# Patient Record
Sex: MALE | Race: BLACK OR AFRICAN AMERICAN | NOT HISPANIC OR LATINO | Employment: OTHER | RURAL
[De-identification: names, ages, dates, MRNs, and addresses within clinical notes are randomized per-mention and may not be internally consistent; named-entity substitution may affect disease eponyms.]

---

## 2018-03-29 ENCOUNTER — HISTORICAL (OUTPATIENT)
Dept: ADMINISTRATIVE | Facility: HOSPITAL | Age: 68
End: 2018-03-29

## 2018-04-02 LAB
LAB AP CLINICAL INFORMATION: NORMAL
LAB AP DIAGNOSIS - HISTORICAL: NORMAL
LAB AP GROSS PATHOLOGY - HISTORICAL: NORMAL
LAB AP SPECIMEN SUBMITTED - HISTORICAL: NORMAL

## 2019-06-04 ENCOUNTER — HISTORICAL (OUTPATIENT)
Dept: ADMINISTRATIVE | Facility: HOSPITAL | Age: 69
End: 2019-06-04

## 2020-10-22 ENCOUNTER — HISTORICAL (OUTPATIENT)
Dept: ADMINISTRATIVE | Facility: HOSPITAL | Age: 70
End: 2020-10-22

## 2020-10-22 LAB
ALBUMIN SERPL BCP-MCNC: 3.3 G/DL (ref 3.5–5)
ALBUMIN/GLOB SERPL: 0.8 {RATIO}
ALP SERPL-CCNC: 76 U/L (ref 45–115)
ALT SERPL W P-5'-P-CCNC: 20 U/L (ref 16–61)
ANION GAP SERPL CALCULATED.3IONS-SCNC: 7.7 MMOL/L (ref 7–16)
AST SERPL W P-5'-P-CCNC: 11 U/L (ref 15–37)
BILIRUB SERPL-MCNC: 0.6 MG/DL (ref 0–1.2)
BUN SERPL-MCNC: 21 MG/DL (ref 7–18)
BUN/CREAT SERPL: 15
CALCIUM SERPL-MCNC: 10 MG/DL (ref 8.5–10.1)
CHLORIDE SERPL-SCNC: 107 MMOL/L (ref 98–107)
CO2 SERPL-SCNC: 30 MMOL/L (ref 21–32)
CREAT SERPL-MCNC: 1.43 MG/DL (ref 0.7–1.3)
GLOBULIN SER-MCNC: 4.3 G/DL (ref 2–4)
GLUCOSE SERPL-MCNC: 168 MG/DL (ref 74–106)
NT-PROBNP SERPL-MCNC: 101 PG/ML (ref 1–125)
POTASSIUM SERPL-SCNC: 4.7 MMOL/L (ref 3.5–5.1)
PROT SERPL-MCNC: 7.6 G/DL (ref 6.4–8.2)
SODIUM SERPL-SCNC: 140 MMOL/L (ref 136–145)

## 2021-06-09 ENCOUNTER — OFFICE VISIT (OUTPATIENT)
Dept: FAMILY MEDICINE | Facility: CLINIC | Age: 71
End: 2021-06-09
Payer: COMMERCIAL

## 2021-06-09 VITALS
BODY MASS INDEX: 30.01 KG/M2 | SYSTOLIC BLOOD PRESSURE: 135 MMHG | OXYGEN SATURATION: 97 % | DIASTOLIC BLOOD PRESSURE: 76 MMHG | TEMPERATURE: 97 F | WEIGHT: 198 LBS | HEART RATE: 96 BPM | HEIGHT: 68 IN

## 2021-06-09 DIAGNOSIS — I10 ESSENTIAL HYPERTENSION: ICD-10-CM

## 2021-06-09 DIAGNOSIS — I50.9 CONGESTIVE HEART FAILURE, UNSPECIFIED HF CHRONICITY, UNSPECIFIED HEART FAILURE TYPE: ICD-10-CM

## 2021-06-09 DIAGNOSIS — N18.9 CHRONIC KIDNEY DISEASE, UNSPECIFIED CKD STAGE: ICD-10-CM

## 2021-06-09 DIAGNOSIS — E11.9 TYPE 2 DIABETES MELLITUS WITHOUT COMPLICATION, WITH LONG-TERM CURRENT USE OF INSULIN: ICD-10-CM

## 2021-06-09 DIAGNOSIS — R53.83 FATIGUE, UNSPECIFIED TYPE: ICD-10-CM

## 2021-06-09 DIAGNOSIS — K21.9 GASTROESOPHAGEAL REFLUX DISEASE WITHOUT ESOPHAGITIS: ICD-10-CM

## 2021-06-09 DIAGNOSIS — R63.0 DECREASED APPETITE: Primary | ICD-10-CM

## 2021-06-09 DIAGNOSIS — Z79.4 TYPE 2 DIABETES MELLITUS WITHOUT COMPLICATION, WITH LONG-TERM CURRENT USE OF INSULIN: ICD-10-CM

## 2021-06-09 PROCEDURE — 82043 MICROALBUMIN / CREATININE RATIO URINE: ICD-10-PCS | Mod: QW,,, | Performed by: CLINICAL MEDICAL LABORATORY

## 2021-06-09 PROCEDURE — 82043 UR ALBUMIN QUANTITATIVE: CPT | Mod: QW,,, | Performed by: CLINICAL MEDICAL LABORATORY

## 2021-06-09 PROCEDURE — 87086 CULTURE, URINE: ICD-10-PCS | Mod: GZ,,, | Performed by: CLINICAL MEDICAL LABORATORY

## 2021-06-09 PROCEDURE — 99214 OFFICE O/P EST MOD 30 MIN: CPT | Mod: ,,, | Performed by: FAMILY MEDICINE

## 2021-06-09 PROCEDURE — 80061 LIPID PANEL: CPT | Mod: QW,,, | Performed by: CLINICAL MEDICAL LABORATORY

## 2021-06-09 PROCEDURE — 83036 HEMOGLOBIN GLYCOSYLATED A1C: CPT | Mod: QW,,, | Performed by: CLINICAL MEDICAL LABORATORY

## 2021-06-09 PROCEDURE — 82570 MICROALBUMIN / CREATININE RATIO URINE: ICD-10-PCS | Mod: QW,,, | Performed by: CLINICAL MEDICAL LABORATORY

## 2021-06-09 PROCEDURE — 85025 COMPLETE CBC W/AUTO DIFF WBC: CPT | Mod: ,,, | Performed by: CLINICAL MEDICAL LABORATORY

## 2021-06-09 PROCEDURE — 85025 CBC WITH DIFFERENTIAL: ICD-10-PCS | Mod: ,,, | Performed by: CLINICAL MEDICAL LABORATORY

## 2021-06-09 PROCEDURE — 81001 URINALYSIS AUTO W/SCOPE: CPT | Mod: ,,, | Performed by: CLINICAL MEDICAL LABORATORY

## 2021-06-09 PROCEDURE — 83036 HEMOGLOBIN A1C: ICD-10-PCS | Mod: QW,,, | Performed by: CLINICAL MEDICAL LABORATORY

## 2021-06-09 PROCEDURE — 99214 PR OFFICE/OUTPT VISIT, EST, LEVL IV, 30-39 MIN: ICD-10-PCS | Mod: ,,, | Performed by: FAMILY MEDICINE

## 2021-06-09 PROCEDURE — 82570 ASSAY OF URINE CREATININE: CPT | Mod: QW,,, | Performed by: CLINICAL MEDICAL LABORATORY

## 2021-06-09 PROCEDURE — 87086 URINE CULTURE/COLONY COUNT: CPT | Mod: GZ,,, | Performed by: CLINICAL MEDICAL LABORATORY

## 2021-06-09 PROCEDURE — 80053 COMPREHENSIVE METABOLIC PANEL: ICD-10-PCS | Mod: QW,,, | Performed by: CLINICAL MEDICAL LABORATORY

## 2021-06-09 PROCEDURE — 80053 COMPREHEN METABOLIC PANEL: CPT | Mod: QW,,, | Performed by: CLINICAL MEDICAL LABORATORY

## 2021-06-09 PROCEDURE — 81001 URINALYSIS: ICD-10-PCS | Mod: ,,, | Performed by: CLINICAL MEDICAL LABORATORY

## 2021-06-09 PROCEDURE — 84443 ASSAY THYROID STIM HORMONE: CPT | Mod: QW,,, | Performed by: CLINICAL MEDICAL LABORATORY

## 2021-06-09 PROCEDURE — 80061 LIPID PANEL: ICD-10-PCS | Mod: QW,,, | Performed by: CLINICAL MEDICAL LABORATORY

## 2021-06-09 PROCEDURE — 84443 TSH: ICD-10-PCS | Mod: QW,,, | Performed by: CLINICAL MEDICAL LABORATORY

## 2021-06-09 RX ORDER — FUROSEMIDE 20 MG/1
20 TABLET ORAL 2 TIMES DAILY
COMMUNITY
Start: 2020-12-28 | End: 2022-03-01 | Stop reason: SDUPTHER

## 2021-06-09 RX ORDER — GLIPIZIDE 10 MG/1
10 TABLET ORAL
COMMUNITY
Start: 2021-03-02 | End: 2022-03-01 | Stop reason: SDUPTHER

## 2021-06-09 RX ORDER — GABAPENTIN 600 MG/1
600 TABLET ORAL 2 TIMES DAILY
COMMUNITY
Start: 2021-02-19 | End: 2021-11-29

## 2021-06-09 RX ORDER — TAMSULOSIN HYDROCHLORIDE 0.4 MG/1
1 CAPSULE ORAL
COMMUNITY
End: 2022-03-01 | Stop reason: SDUPTHER

## 2021-06-09 RX ORDER — MONTELUKAST SODIUM 10 MG/1
10 TABLET ORAL NIGHTLY
COMMUNITY
Start: 2021-03-22 | End: 2021-09-08

## 2021-06-09 RX ORDER — AMLODIPINE AND BENAZEPRIL HYDROCHLORIDE 5; 20 MG/1; MG/1
1 CAPSULE ORAL DAILY
COMMUNITY
Start: 2021-03-22 | End: 2022-02-28

## 2021-06-09 RX ORDER — ALLOPURINOL 100 MG/1
100 TABLET ORAL DAILY
COMMUNITY
Start: 2021-03-12 | End: 2022-02-16 | Stop reason: SDUPTHER

## 2021-06-09 RX ORDER — LOVASTATIN 20 MG/1
20 TABLET ORAL NIGHTLY
COMMUNITY
Start: 2021-03-15 | End: 2022-03-01 | Stop reason: SDUPTHER

## 2021-06-09 RX ORDER — METFORMIN HYDROCHLORIDE 1000 MG/1
1000 TABLET ORAL 2 TIMES DAILY
COMMUNITY
Start: 2021-03-08 | End: 2022-03-01 | Stop reason: SDUPTHER

## 2021-06-09 RX ORDER — CYPROHEPTADINE HYDROCHLORIDE 4 MG/1
TABLET ORAL
Qty: 30 TABLET | Refills: 1 | Status: SHIPPED | OUTPATIENT
Start: 2021-06-09

## 2021-06-09 RX ORDER — PEN NEEDLE, DIABETIC 32GX 5/32"
NEEDLE, DISPOSABLE MISCELLANEOUS
COMMUNITY
Start: 2020-12-28

## 2021-06-10 LAB
ALBUMIN SERPL BCP-MCNC: 3.3 G/DL (ref 3.5–5)
ALBUMIN/GLOB SERPL: 0.8 {RATIO}
ALP SERPL-CCNC: 76 U/L (ref 45–115)
ALT SERPL W P-5'-P-CCNC: 15 U/L (ref 16–61)
ANION GAP SERPL CALCULATED.3IONS-SCNC: 8 MMOL/L (ref 7–16)
AST SERPL W P-5'-P-CCNC: 10 U/L (ref 15–37)
BACTERIA #/AREA URNS HPF: ABNORMAL /HPF
BASOPHILS # BLD AUTO: 0.03 K/UL (ref 0–0.2)
BASOPHILS NFR BLD AUTO: 0.4 % (ref 0–1)
BILIRUB SERPL-MCNC: 0.5 MG/DL (ref 0–1.2)
BILIRUB UR QL STRIP: NEGATIVE
BUN SERPL-MCNC: 20 MG/DL (ref 7–18)
BUN/CREAT SERPL: 13 (ref 6–20)
CALCIUM SERPL-MCNC: 9.9 MG/DL (ref 8.5–10.1)
CHLORIDE SERPL-SCNC: 104 MMOL/L (ref 98–107)
CHOLEST SERPL-MCNC: 122 MG/DL (ref 0–200)
CHOLEST/HDLC SERPL: 3.7 {RATIO}
CLARITY UR: ABNORMAL
CO2 SERPL-SCNC: 31 MMOL/L (ref 21–32)
COARSE GRAN CASTS #/AREA URNS LPF: ABNORMAL /LPF
COLOR UR: YELLOW
CREAT SERPL-MCNC: 1.6 MG/DL (ref 0.7–1.3)
CREAT UR-MCNC: 348 MG/DL (ref 39–259)
DIFFERENTIAL METHOD BLD: ABNORMAL
EOSINOPHIL # BLD AUTO: 0.1 K/UL (ref 0–0.5)
EOSINOPHIL NFR BLD AUTO: 1.5 % (ref 1–4)
ERYTHROCYTE [DISTWIDTH] IN BLOOD BY AUTOMATED COUNT: 13 % (ref 11.5–14.5)
EST. AVERAGE GLUCOSE BLD GHB EST-MCNC: 90 MG/DL
GLOBULIN SER-MCNC: 4.2 G/DL (ref 2–4)
GLUCOSE SERPL-MCNC: 150 MG/DL (ref 74–106)
GLUCOSE UR STRIP-MCNC: NEGATIVE MG/DL
HBA1C MFR BLD HPLC: 5.3 % (ref 4.5–6.6)
HCT VFR BLD AUTO: 36.3 % (ref 40–54)
HDLC SERPL-MCNC: 33 MG/DL (ref 40–60)
HGB BLD-MCNC: 11.8 G/DL (ref 13.5–18)
HYALINE CASTS #/AREA URNS LPF: ABNORMAL /LPF
IMM GRANULOCYTES # BLD AUTO: 0.02 K/UL (ref 0–0.04)
IMM GRANULOCYTES NFR BLD: 0.3 % (ref 0–0.4)
KETONES UR STRIP-SCNC: ABNORMAL MG/DL
LDLC SERPL CALC-MCNC: 62 MG/DL
LDLC/HDLC SERPL: 1.9 {RATIO}
LEUKOCYTE ESTERASE UR QL STRIP: NEGATIVE
LYMPHOCYTES # BLD AUTO: 2.32 K/UL (ref 1–4.8)
LYMPHOCYTES NFR BLD AUTO: 34.8 % (ref 27–41)
MCH RBC QN AUTO: 30.2 PG (ref 27–31)
MCHC RBC AUTO-ENTMCNC: 32.5 G/DL (ref 32–36)
MCV RBC AUTO: 92.8 FL (ref 80–96)
MICROALBUMIN UR-MCNC: 106 MG/DL (ref 0–2.8)
MICROALBUMIN/CREAT RATIO PNL UR: 304.6 MG/G (ref 0–30)
MONOCYTES # BLD AUTO: 0.45 K/UL (ref 0–0.8)
MONOCYTES NFR BLD AUTO: 6.7 % (ref 2–6)
MPC BLD CALC-MCNC: 11.2 FL (ref 9.4–12.4)
MUCOUS THREADS #/AREA URNS HPF: ABNORMAL /HPF
NEUTROPHILS # BLD AUTO: 3.75 K/UL (ref 1.8–7.7)
NEUTROPHILS NFR BLD AUTO: 56.3 % (ref 53–65)
NITRITE UR QL STRIP: NEGATIVE
NONHDLC SERPL-MCNC: 89 MG/DL
NRBC # BLD AUTO: 0 X10E3/UL
NRBC, AUTO (.00): 0 %
PH UR STRIP: 5 PH UNITS
PLATELET # BLD AUTO: 184 K/UL (ref 150–400)
POTASSIUM SERPL-SCNC: 4.4 MMOL/L (ref 3.5–5.1)
PROT SERPL-MCNC: 7.5 G/DL (ref 6.4–8.2)
PROT UR QL STRIP: 100
RBC # BLD AUTO: 3.91 M/UL (ref 4.6–6.2)
RBC # UR STRIP: NEGATIVE /UL
RBC #/AREA URNS HPF: ABNORMAL /HPF
SODIUM SERPL-SCNC: 139 MMOL/L (ref 136–145)
SP GR UR STRIP: >=1.03
SQUAMOUS #/AREA URNS LPF: ABNORMAL /LPF
TRIGL SERPL-MCNC: 133 MG/DL (ref 35–150)
TSH SERPL DL<=0.005 MIU/L-ACNC: 0.26 UIU/ML (ref 0.36–3.74)
UROBILINOGEN UR STRIP-ACNC: 0.2 MG/DL
VLDLC SERPL-MCNC: 27 MG/DL
WBC # BLD AUTO: 6.67 K/UL (ref 4.5–11)
WBC #/AREA URNS HPF: ABNORMAL /HPF

## 2021-06-12 LAB — UA COMPLETE W REFLEX CULTURE PNL UR: NORMAL

## 2021-06-15 RX ORDER — CEFDINIR 300 MG/1
300 CAPSULE ORAL 2 TIMES DAILY
Qty: 20 CAPSULE | Refills: 0 | Status: SHIPPED | OUTPATIENT
Start: 2021-06-15 | End: 2021-06-25

## 2021-06-17 ENCOUNTER — TELEPHONE (OUTPATIENT)
Dept: FAMILY MEDICINE | Facility: CLINIC | Age: 71
End: 2021-06-17

## 2021-06-22 ENCOUNTER — TELEPHONE (OUTPATIENT)
Dept: FAMILY MEDICINE | Facility: CLINIC | Age: 71
End: 2021-06-22

## 2021-06-22 DIAGNOSIS — R79.89 LOW SERUM THYROID STIMULATING HORMONE (TSH): Primary | ICD-10-CM

## 2021-08-24 ENCOUNTER — OFFICE VISIT (OUTPATIENT)
Dept: FAMILY MEDICINE | Facility: CLINIC | Age: 71
End: 2021-08-24
Payer: COMMERCIAL

## 2021-08-24 VITALS
HEART RATE: 93 BPM | DIASTOLIC BLOOD PRESSURE: 88 MMHG | TEMPERATURE: 98 F | OXYGEN SATURATION: 96 % | WEIGHT: 192 LBS | BODY MASS INDEX: 29.1 KG/M2 | SYSTOLIC BLOOD PRESSURE: 143 MMHG | HEIGHT: 68 IN

## 2021-08-24 DIAGNOSIS — Z71.6 ENCOUNTER FOR SMOKING CESSATION COUNSELING: Primary | ICD-10-CM

## 2021-08-24 DIAGNOSIS — K21.9 GASTROESOPHAGEAL REFLUX DISEASE WITHOUT ESOPHAGITIS: ICD-10-CM

## 2021-08-24 DIAGNOSIS — I10 ESSENTIAL HYPERTENSION: ICD-10-CM

## 2021-08-24 DIAGNOSIS — Z79.4 TYPE 2 DIABETES MELLITUS WITHOUT COMPLICATION, WITH LONG-TERM CURRENT USE OF INSULIN: ICD-10-CM

## 2021-08-24 DIAGNOSIS — E11.9 TYPE 2 DIABETES MELLITUS WITHOUT COMPLICATION, WITH LONG-TERM CURRENT USE OF INSULIN: ICD-10-CM

## 2021-08-24 DIAGNOSIS — I50.9 CONGESTIVE HEART FAILURE, UNSPECIFIED HF CHRONICITY, UNSPECIFIED HEART FAILURE TYPE: ICD-10-CM

## 2021-08-24 DIAGNOSIS — E05.90 HYPERTHYROIDISM: ICD-10-CM

## 2021-08-24 DIAGNOSIS — N18.9 CHRONIC KIDNEY DISEASE, UNSPECIFIED CKD STAGE: ICD-10-CM

## 2021-08-24 PROCEDURE — 99213 OFFICE O/P EST LOW 20 MIN: CPT | Mod: ,,, | Performed by: FAMILY MEDICINE

## 2021-08-24 PROCEDURE — 99213 PR OFFICE/OUTPT VISIT, EST, LEVL III, 20-29 MIN: ICD-10-PCS | Mod: ,,, | Performed by: FAMILY MEDICINE

## 2021-08-24 RX ORDER — LATANOPROST 50 UG/ML
1 SOLUTION/ DROPS OPHTHALMIC NIGHTLY
COMMUNITY
Start: 2018-05-14 | End: 2021-08-24 | Stop reason: SDUPTHER

## 2021-08-24 RX ORDER — HYDROCHLOROTHIAZIDE 25 MG/1
25 TABLET ORAL DAILY
COMMUNITY
Start: 2021-07-22 | End: 2022-03-01 | Stop reason: SDUPTHER

## 2021-08-24 RX ORDER — CALCIUM CITRATE/VITAMIN D3 200MG-6.25
TABLET ORAL DAILY
COMMUNITY
Start: 2021-08-05

## 2021-08-24 RX ORDER — LATANOPROST 50 UG/ML
1 SOLUTION/ DROPS OPHTHALMIC NIGHTLY
Qty: 7.5 ML | Refills: 3 | Status: SHIPPED | OUTPATIENT
Start: 2021-08-24

## 2021-08-24 RX ORDER — IBUPROFEN 200 MG
1 TABLET ORAL DAILY
Qty: 30 PATCH | Refills: 1 | Status: SHIPPED | OUTPATIENT
Start: 2021-08-24 | End: 2022-03-01

## 2021-08-24 RX ORDER — CETIRIZINE HYDROCHLORIDE 10 MG/1
10 TABLET ORAL DAILY
COMMUNITY
Start: 2021-08-11 | End: 2021-09-08

## 2021-10-28 ENCOUNTER — IMMUNIZATION (OUTPATIENT)
Dept: FAMILY MEDICINE | Facility: CLINIC | Age: 71
End: 2021-10-28
Payer: COMMERCIAL

## 2021-10-28 ENCOUNTER — OFFICE VISIT (OUTPATIENT)
Dept: FAMILY MEDICINE | Facility: CLINIC | Age: 71
End: 2021-10-28
Payer: COMMERCIAL

## 2021-10-28 DIAGNOSIS — E05.90 HYPERTHYROIDISM: ICD-10-CM

## 2021-10-28 DIAGNOSIS — N18.9 CHRONIC KIDNEY DISEASE, UNSPECIFIED CKD STAGE: ICD-10-CM

## 2021-10-28 DIAGNOSIS — I50.9 CONGESTIVE HEART FAILURE, UNSPECIFIED HF CHRONICITY, UNSPECIFIED HEART FAILURE TYPE: ICD-10-CM

## 2021-10-28 DIAGNOSIS — E11.9 TYPE 2 DIABETES MELLITUS WITHOUT COMPLICATION, WITH LONG-TERM CURRENT USE OF INSULIN: Primary | ICD-10-CM

## 2021-10-28 DIAGNOSIS — Z23 NEED FOR VACCINATION: Primary | ICD-10-CM

## 2021-10-28 DIAGNOSIS — Z79.4 TYPE 2 DIABETES MELLITUS WITHOUT COMPLICATION, WITH LONG-TERM CURRENT USE OF INSULIN: Primary | ICD-10-CM

## 2021-10-28 DIAGNOSIS — I10 ESSENTIAL HYPERTENSION: ICD-10-CM

## 2021-10-28 PROCEDURE — 0013A PR IMMUNIZ ADMIN, SARS-COV-2 COVID-19 VACC, 100MCG/0.5ML, 3RD DOSE: CPT | Mod: ,,, | Performed by: NURSE PRACTITIONER

## 2021-10-28 PROCEDURE — 81001 URINALYSIS AUTO W/SCOPE: CPT | Mod: ,,, | Performed by: CLINICAL MEDICAL LABORATORY

## 2021-10-28 PROCEDURE — 80061 LIPID PANEL: CPT | Mod: ,,, | Performed by: CLINICAL MEDICAL LABORATORY

## 2021-10-28 PROCEDURE — 80053 COMPREHENSIVE METABOLIC PANEL: ICD-10-PCS | Mod: ,,, | Performed by: CLINICAL MEDICAL LABORATORY

## 2021-10-28 PROCEDURE — 80053 COMPREHEN METABOLIC PANEL: CPT | Mod: ,,, | Performed by: CLINICAL MEDICAL LABORATORY

## 2021-10-28 PROCEDURE — 83036 HEMOGLOBIN A1C: ICD-10-PCS | Mod: ,,, | Performed by: CLINICAL MEDICAL LABORATORY

## 2021-10-28 PROCEDURE — 83036 HEMOGLOBIN GLYCOSYLATED A1C: CPT | Mod: ,,, | Performed by: CLINICAL MEDICAL LABORATORY

## 2021-10-28 PROCEDURE — 99214 PR OFFICE/OUTPT VISIT, EST, LEVL IV, 30-39 MIN: ICD-10-PCS | Mod: ,,, | Performed by: FAMILY MEDICINE

## 2021-10-28 PROCEDURE — 91301 COVID-19, MRNA, LNP-S, PF, 100 MCG/0.25 ML DOSE VACCINE (MODERNA BOOSTER): ICD-10-PCS | Mod: ,,, | Performed by: NURSE PRACTITIONER

## 2021-10-28 PROCEDURE — 81001 URINALYSIS: ICD-10-PCS | Mod: ,,, | Performed by: CLINICAL MEDICAL LABORATORY

## 2021-10-28 PROCEDURE — 99214 OFFICE O/P EST MOD 30 MIN: CPT | Mod: ,,, | Performed by: FAMILY MEDICINE

## 2021-10-28 PROCEDURE — 0013A PR IMMUNIZ ADMIN, SARS-COV-2 COVID-19 VACC, 100MCG/0.5ML, 3RD DOSE: ICD-10-PCS | Mod: ,,, | Performed by: NURSE PRACTITIONER

## 2021-10-28 PROCEDURE — 80061 LIPID PANEL: ICD-10-PCS | Mod: ,,, | Performed by: CLINICAL MEDICAL LABORATORY

## 2021-10-28 PROCEDURE — 91301 COVID-19, MRNA, LNP-S, PF, 100 MCG/0.25 ML DOSE VACCINE (MODERNA BOOSTER): CPT | Mod: ,,, | Performed by: NURSE PRACTITIONER

## 2021-10-29 LAB
ALBUMIN SERPL BCP-MCNC: 3.5 G/DL (ref 3.5–5)
ALBUMIN/GLOB SERPL: 0.8 {RATIO}
ALP SERPL-CCNC: 85 U/L (ref 45–115)
ALT SERPL W P-5'-P-CCNC: 13 U/L (ref 16–61)
ANION GAP SERPL CALCULATED.3IONS-SCNC: 5 MMOL/L (ref 7–16)
AST SERPL W P-5'-P-CCNC: 9 U/L (ref 15–37)
BACTERIA #/AREA URNS HPF: ABNORMAL /HPF
BILIRUB SERPL-MCNC: 0.4 MG/DL (ref 0–1.2)
BILIRUB UR QL STRIP: NEGATIVE
BUN SERPL-MCNC: 22 MG/DL (ref 7–18)
BUN/CREAT SERPL: 13 (ref 6–20)
CALCIUM SERPL-MCNC: 9.9 MG/DL (ref 8.5–10.1)
CAOX CRY #/AREA URNS LPF: ABNORMAL /LPF
CHLORIDE SERPL-SCNC: 109 MMOL/L (ref 98–107)
CHOLEST SERPL-MCNC: 117 MG/DL (ref 0–200)
CHOLEST/HDLC SERPL: 3.2 {RATIO}
CLARITY UR: CLEAR
CO2 SERPL-SCNC: 32 MMOL/L (ref 21–32)
COLOR UR: YELLOW
CREAT SERPL-MCNC: 1.63 MG/DL (ref 0.7–1.3)
EST. AVERAGE GLUCOSE BLD GHB EST-MCNC: 94 MG/DL
GLOBULIN SER-MCNC: 4.3 G/DL (ref 2–4)
GLUCOSE SERPL-MCNC: 81 MG/DL (ref 74–106)
GLUCOSE UR STRIP-MCNC: NEGATIVE MG/DL
HBA1C MFR BLD HPLC: 5.4 % (ref 4.5–6.6)
HDLC SERPL-MCNC: 37 MG/DL (ref 40–60)
KETONES UR STRIP-SCNC: NEGATIVE MG/DL
LDLC SERPL CALC-MCNC: 55 MG/DL
LDLC/HDLC SERPL: 1.5 {RATIO}
LEUKOCYTE ESTERASE UR QL STRIP: NEGATIVE
NITRITE UR QL STRIP: NEGATIVE
NONHDLC SERPL-MCNC: 80 MG/DL
PH UR STRIP: 5.5 PH UNITS
POTASSIUM SERPL-SCNC: 4.4 MMOL/L (ref 3.5–5.1)
PROT SERPL-MCNC: 7.8 G/DL (ref 6.4–8.2)
PROT UR QL STRIP: 100
RBC # UR STRIP: ABNORMAL /UL
RBC #/AREA URNS HPF: ABNORMAL /HPF
SODIUM SERPL-SCNC: 142 MMOL/L (ref 136–145)
SP GR UR STRIP: 1.02
SQUAMOUS #/AREA URNS LPF: ABNORMAL /LPF
TRIGL SERPL-MCNC: 123 MG/DL (ref 35–150)
UROBILINOGEN UR STRIP-ACNC: 1 MG/DL
VLDLC SERPL-MCNC: 25 MG/DL
WBC #/AREA URNS HPF: ABNORMAL /HPF

## 2021-11-02 ENCOUNTER — TELEPHONE (OUTPATIENT)
Dept: FAMILY MEDICINE | Facility: CLINIC | Age: 71
End: 2021-11-02
Payer: COMMERCIAL

## 2021-11-17 ENCOUNTER — OFFICE VISIT (OUTPATIENT)
Dept: FAMILY MEDICINE | Facility: CLINIC | Age: 71
End: 2021-11-17
Payer: COMMERCIAL

## 2021-11-17 VITALS
WEIGHT: 187.38 LBS | TEMPERATURE: 98 F | DIASTOLIC BLOOD PRESSURE: 80 MMHG | SYSTOLIC BLOOD PRESSURE: 142 MMHG | BODY MASS INDEX: 28.4 KG/M2 | HEIGHT: 68 IN | HEART RATE: 91 BPM | OXYGEN SATURATION: 97 %

## 2021-11-17 DIAGNOSIS — Z23 ENCOUNTER FOR IMMUNIZATION: Primary | ICD-10-CM

## 2021-11-17 DIAGNOSIS — I10 ESSENTIAL HYPERTENSION: ICD-10-CM

## 2021-11-17 DIAGNOSIS — E05.90 HYPERTHYROIDISM: ICD-10-CM

## 2021-11-17 DIAGNOSIS — E11.9 TYPE 2 DIABETES MELLITUS WITHOUT COMPLICATION, WITH LONG-TERM CURRENT USE OF INSULIN: ICD-10-CM

## 2021-11-17 DIAGNOSIS — Z79.4 TYPE 2 DIABETES MELLITUS WITHOUT COMPLICATION, WITH LONG-TERM CURRENT USE OF INSULIN: ICD-10-CM

## 2021-11-17 PROCEDURE — G0008 FLU VACCINE - QUADRIVALENT - HIGH DOSE (65+) PRESERVATIVE FREE IM: ICD-10-PCS | Mod: ,,, | Performed by: FAMILY MEDICINE

## 2021-11-17 PROCEDURE — 90662 IIV NO PRSV INCREASED AG IM: CPT | Mod: ,,, | Performed by: FAMILY MEDICINE

## 2021-11-17 PROCEDURE — G0008 ADMIN INFLUENZA VIRUS VAC: HCPCS | Mod: ,,, | Performed by: FAMILY MEDICINE

## 2021-11-17 PROCEDURE — 99214 OFFICE O/P EST MOD 30 MIN: CPT | Mod: ,,, | Performed by: FAMILY MEDICINE

## 2021-11-17 PROCEDURE — 90662 FLU VACCINE - QUADRIVALENT - HIGH DOSE (65+) PRESERVATIVE FREE IM: ICD-10-PCS | Mod: ,,, | Performed by: FAMILY MEDICINE

## 2021-11-17 PROCEDURE — 99214 PR OFFICE/OUTPT VISIT, EST, LEVL IV, 30-39 MIN: ICD-10-PCS | Mod: ,,, | Performed by: FAMILY MEDICINE

## 2022-02-16 RX ORDER — ALLOPURINOL 100 MG/1
100 TABLET ORAL DAILY
Qty: 90 TABLET | Refills: 2 | Status: SHIPPED | OUTPATIENT
Start: 2022-02-16 | End: 2022-03-01 | Stop reason: SDUPTHER

## 2022-03-01 ENCOUNTER — OFFICE VISIT (OUTPATIENT)
Dept: FAMILY MEDICINE | Facility: CLINIC | Age: 72
End: 2022-03-01
Payer: COMMERCIAL

## 2022-03-01 VITALS
BODY MASS INDEX: 28.19 KG/M2 | WEIGHT: 186 LBS | HEART RATE: 97 BPM | SYSTOLIC BLOOD PRESSURE: 143 MMHG | TEMPERATURE: 97 F | HEIGHT: 68 IN | DIASTOLIC BLOOD PRESSURE: 85 MMHG | OXYGEN SATURATION: 98 %

## 2022-03-01 DIAGNOSIS — Z79.4 TYPE 2 DIABETES MELLITUS WITHOUT COMPLICATION, WITH LONG-TERM CURRENT USE OF INSULIN: Primary | ICD-10-CM

## 2022-03-01 DIAGNOSIS — K21.9 GASTROESOPHAGEAL REFLUX DISEASE WITHOUT ESOPHAGITIS: ICD-10-CM

## 2022-03-01 DIAGNOSIS — E11.9 TYPE 2 DIABETES MELLITUS WITHOUT COMPLICATION, WITH LONG-TERM CURRENT USE OF INSULIN: Primary | ICD-10-CM

## 2022-03-01 DIAGNOSIS — N40.0 BENIGN PROSTATIC HYPERPLASIA WITHOUT LOWER URINARY TRACT SYMPTOMS: ICD-10-CM

## 2022-03-01 DIAGNOSIS — I10 ESSENTIAL HYPERTENSION: ICD-10-CM

## 2022-03-01 DIAGNOSIS — M1A.09X0 CHRONIC GOUT OF MULTIPLE SITES, UNSPECIFIED CAUSE: ICD-10-CM

## 2022-03-01 DIAGNOSIS — N18.9 CHRONIC KIDNEY DISEASE, UNSPECIFIED CKD STAGE: ICD-10-CM

## 2022-03-01 DIAGNOSIS — E05.90 HYPERTHYROIDISM: ICD-10-CM

## 2022-03-01 DIAGNOSIS — I50.9 CONGESTIVE HEART FAILURE, UNSPECIFIED HF CHRONICITY, UNSPECIFIED HEART FAILURE TYPE: ICD-10-CM

## 2022-03-01 DIAGNOSIS — Z12.5 SCREENING FOR MALIGNANT NEOPLASM OF PROSTATE: ICD-10-CM

## 2022-03-01 DIAGNOSIS — R53.1 WEAKNESS: ICD-10-CM

## 2022-03-01 DIAGNOSIS — J30.89 NON-SEASONAL ALLERGIC RHINITIS DUE TO OTHER ALLERGIC TRIGGER: ICD-10-CM

## 2022-03-01 PROBLEM — M10.9 GOUT OF MULTIPLE SITES: Status: ACTIVE | Noted: 2022-03-01

## 2022-03-01 PROBLEM — J30.9 ALLERGIC RHINITIS: Status: ACTIVE | Noted: 2022-03-01

## 2022-03-01 LAB
ALBUMIN SERPL BCP-MCNC: 3 G/DL (ref 3.5–5)
ALBUMIN/GLOB SERPL: 0.7 {RATIO}
ALP SERPL-CCNC: 105 U/L (ref 45–115)
ALT SERPL W P-5'-P-CCNC: 14 U/L (ref 16–61)
ANION GAP SERPL CALCULATED.3IONS-SCNC: 7 MMOL/L (ref 7–16)
AST SERPL W P-5'-P-CCNC: 7 U/L (ref 15–37)
BACTERIA #/AREA URNS HPF: NORMAL /HPF
BASOPHILS # BLD AUTO: 0.02 K/UL (ref 0–0.2)
BASOPHILS NFR BLD AUTO: 0.3 % (ref 0–1)
BILIRUB SERPL-MCNC: 0.3 MG/DL (ref 0–1.2)
BILIRUB UR QL STRIP: NEGATIVE
BUN SERPL-MCNC: 14 MG/DL (ref 7–18)
BUN/CREAT SERPL: 10 (ref 6–20)
CALCIUM SERPL-MCNC: 9 MG/DL (ref 8.5–10.1)
CHLORIDE SERPL-SCNC: 107 MMOL/L (ref 98–107)
CHOLEST SERPL-MCNC: 120 MG/DL (ref 0–200)
CHOLEST/HDLC SERPL: 3.1 {RATIO}
CLARITY UR: CLEAR
CO2 SERPL-SCNC: 31 MMOL/L (ref 21–32)
COLOR UR: YELLOW
CREAT SERPL-MCNC: 1.41 MG/DL (ref 0.7–1.3)
CREAT UR-MCNC: 165 MG/DL (ref 39–259)
DIFFERENTIAL METHOD BLD: ABNORMAL
EOSINOPHIL # BLD AUTO: 0.11 K/UL (ref 0–0.5)
EOSINOPHIL NFR BLD AUTO: 1.7 % (ref 1–4)
ERYTHROCYTE [DISTWIDTH] IN BLOOD BY AUTOMATED COUNT: 13.7 % (ref 11.5–14.5)
EST. AVERAGE GLUCOSE BLD GHB EST-MCNC: 87 MG/DL
GLOBULIN SER-MCNC: 4.1 G/DL (ref 2–4)
GLUCOSE SERPL-MCNC: 223 MG/DL (ref 74–106)
GLUCOSE UR STRIP-MCNC: 250 MG/DL
HBA1C MFR BLD HPLC: 5.2 % (ref 4.5–6.6)
HCT VFR BLD AUTO: 37.4 % (ref 40–54)
HDLC SERPL-MCNC: 39 MG/DL (ref 40–60)
HGB BLD-MCNC: 12.4 G/DL (ref 13.5–18)
IMM GRANULOCYTES # BLD AUTO: 0.01 K/UL (ref 0–0.04)
IMM GRANULOCYTES NFR BLD: 0.2 % (ref 0–0.4)
KETONES UR STRIP-SCNC: NEGATIVE MG/DL
LDLC SERPL CALC-MCNC: 59 MG/DL
LDLC/HDLC SERPL: 1.5 {RATIO}
LEUKOCYTE ESTERASE UR QL STRIP: NEGATIVE
LYMPHOCYTES # BLD AUTO: 1.74 K/UL (ref 1–4.8)
LYMPHOCYTES NFR BLD AUTO: 27.4 % (ref 27–41)
MCH RBC QN AUTO: 30.8 PG (ref 27–31)
MCHC RBC AUTO-ENTMCNC: 33.2 G/DL (ref 32–36)
MCV RBC AUTO: 92.8 FL (ref 80–96)
MICROALBUMIN UR-MCNC: 520 MG/DL (ref 0–2.8)
MICROALBUMIN/CREAT RATIO PNL UR: 3151.5 MG/G (ref 0–30)
MONOCYTES # BLD AUTO: 0.34 K/UL (ref 0–0.8)
MONOCYTES NFR BLD AUTO: 5.4 % (ref 2–6)
MPC BLD CALC-MCNC: 10.2 FL (ref 9.4–12.4)
NEUTROPHILS # BLD AUTO: 4.12 K/UL (ref 1.8–7.7)
NEUTROPHILS NFR BLD AUTO: 65 % (ref 53–65)
NITRITE UR QL STRIP: NEGATIVE
NONHDLC SERPL-MCNC: 81 MG/DL
NRBC # BLD AUTO: 0 X10E3/UL
NRBC, AUTO (.00): 0 %
PH UR STRIP: 6 PH UNITS
PLATELET # BLD AUTO: 185 K/UL (ref 150–400)
POTASSIUM SERPL-SCNC: 4.4 MMOL/L (ref 3.5–5.1)
PROT SERPL-MCNC: 7.1 G/DL (ref 6.4–8.2)
PROT UR QL STRIP: >=300
PSA SERPL-MCNC: 0.86 NG/ML (ref 0–4.4)
RBC # BLD AUTO: 4.03 M/UL (ref 4.6–6.2)
RBC # UR STRIP: ABNORMAL /UL
RBC #/AREA URNS HPF: NORMAL /HPF
SODIUM SERPL-SCNC: 141 MMOL/L (ref 136–145)
SP GR UR STRIP: >=1.03
SQUAMOUS #/AREA URNS LPF: NORMAL /LPF
TRIGL SERPL-MCNC: 110 MG/DL (ref 35–150)
UROBILINOGEN UR STRIP-ACNC: 1 MG/DL
VLDLC SERPL-MCNC: 22 MG/DL
WBC # BLD AUTO: 6.34 K/UL (ref 4.5–11)
WBC #/AREA URNS HPF: NORMAL /HPF

## 2022-03-01 PROCEDURE — 85025 COMPLETE CBC W/AUTO DIFF WBC: CPT | Mod: ,,, | Performed by: CLINICAL MEDICAL LABORATORY

## 2022-03-01 PROCEDURE — 1159F MED LIST DOCD IN RCRD: CPT | Mod: ,,, | Performed by: FAMILY MEDICINE

## 2022-03-01 PROCEDURE — 85025 CBC WITH DIFFERENTIAL: ICD-10-PCS | Mod: ,,, | Performed by: CLINICAL MEDICAL LABORATORY

## 2022-03-01 PROCEDURE — 80061 LIPID PANEL: CPT | Mod: ,,, | Performed by: CLINICAL MEDICAL LABORATORY

## 2022-03-01 PROCEDURE — G0103 PSA SCREENING: HCPCS | Mod: ,,, | Performed by: CLINICAL MEDICAL LABORATORY

## 2022-03-01 PROCEDURE — 83036 HEMOGLOBIN A1C: ICD-10-PCS | Mod: ,,, | Performed by: CLINICAL MEDICAL LABORATORY

## 2022-03-01 PROCEDURE — 3008F BODY MASS INDEX DOCD: CPT | Mod: ,,, | Performed by: FAMILY MEDICINE

## 2022-03-01 PROCEDURE — 82570 ASSAY OF URINE CREATININE: CPT | Mod: ,,, | Performed by: CLINICAL MEDICAL LABORATORY

## 2022-03-01 PROCEDURE — G0103 PSA, SCREENING: ICD-10-PCS | Mod: ,,, | Performed by: CLINICAL MEDICAL LABORATORY

## 2022-03-01 PROCEDURE — 4010F ACE/ARB THERAPY RXD/TAKEN: CPT | Mod: ,,, | Performed by: FAMILY MEDICINE

## 2022-03-01 PROCEDURE — 81001 URINALYSIS AUTO W/SCOPE: CPT | Mod: ,,, | Performed by: CLINICAL MEDICAL LABORATORY

## 2022-03-01 PROCEDURE — 99214 PR OFFICE/OUTPT VISIT, EST, LEVL IV, 30-39 MIN: ICD-10-PCS | Mod: ,,, | Performed by: FAMILY MEDICINE

## 2022-03-01 PROCEDURE — 3008F PR BODY MASS INDEX (BMI) DOCUMENTED: ICD-10-PCS | Mod: ,,, | Performed by: FAMILY MEDICINE

## 2022-03-01 PROCEDURE — 3079F PR MOST RECENT DIASTOLIC BLOOD PRESSURE 80-89 MM HG: ICD-10-PCS | Mod: ,,, | Performed by: FAMILY MEDICINE

## 2022-03-01 PROCEDURE — 80053 COMPREHENSIVE METABOLIC PANEL: ICD-10-PCS | Mod: ,,, | Performed by: CLINICAL MEDICAL LABORATORY

## 2022-03-01 PROCEDURE — 99214 OFFICE O/P EST MOD 30 MIN: CPT | Mod: ,,, | Performed by: FAMILY MEDICINE

## 2022-03-01 PROCEDURE — 81001 URINALYSIS: ICD-10-PCS | Mod: ,,, | Performed by: CLINICAL MEDICAL LABORATORY

## 2022-03-01 PROCEDURE — 1159F PR MEDICATION LIST DOCUMENTED IN MEDICAL RECORD: ICD-10-PCS | Mod: ,,, | Performed by: FAMILY MEDICINE

## 2022-03-01 PROCEDURE — 82043 UR ALBUMIN QUANTITATIVE: CPT | Mod: ,,, | Performed by: CLINICAL MEDICAL LABORATORY

## 2022-03-01 PROCEDURE — 82043 MICROALBUMIN / CREATININE RATIO URINE: ICD-10-PCS | Mod: ,,, | Performed by: CLINICAL MEDICAL LABORATORY

## 2022-03-01 PROCEDURE — 80053 COMPREHEN METABOLIC PANEL: CPT | Mod: ,,, | Performed by: CLINICAL MEDICAL LABORATORY

## 2022-03-01 PROCEDURE — 83036 HEMOGLOBIN GLYCOSYLATED A1C: CPT | Mod: ,,, | Performed by: CLINICAL MEDICAL LABORATORY

## 2022-03-01 PROCEDURE — 4010F PR ACE/ARB THEARPY RXD/TAKEN: ICD-10-PCS | Mod: ,,, | Performed by: FAMILY MEDICINE

## 2022-03-01 PROCEDURE — 80061 LIPID PANEL: ICD-10-PCS | Mod: ,,, | Performed by: CLINICAL MEDICAL LABORATORY

## 2022-03-01 PROCEDURE — 3077F PR MOST RECENT SYSTOLIC BLOOD PRESSURE >= 140 MM HG: ICD-10-PCS | Mod: ,,, | Performed by: FAMILY MEDICINE

## 2022-03-01 PROCEDURE — 3077F SYST BP >= 140 MM HG: CPT | Mod: ,,, | Performed by: FAMILY MEDICINE

## 2022-03-01 PROCEDURE — 82570 MICROALBUMIN / CREATININE RATIO URINE: ICD-10-PCS | Mod: ,,, | Performed by: CLINICAL MEDICAL LABORATORY

## 2022-03-01 PROCEDURE — 3079F DIAST BP 80-89 MM HG: CPT | Mod: ,,, | Performed by: FAMILY MEDICINE

## 2022-03-01 RX ORDER — GABAPENTIN 400 MG/1
400 CAPSULE ORAL 2 TIMES DAILY
Qty: 60 CAPSULE | Refills: 5 | Status: SHIPPED | OUTPATIENT
Start: 2022-03-01 | End: 2023-03-01

## 2022-03-01 RX ORDER — HYDROCHLOROTHIAZIDE 25 MG/1
25 TABLET ORAL DAILY
Qty: 90 TABLET | Refills: 1 | Status: SHIPPED | OUTPATIENT
Start: 2022-03-01 | End: 2023-01-11

## 2022-03-01 RX ORDER — AMLODIPINE AND BENAZEPRIL HYDROCHLORIDE 5; 20 MG/1; MG/1
1 CAPSULE ORAL DAILY
Qty: 90 CAPSULE | Refills: 2 | Status: SHIPPED | OUTPATIENT
Start: 2022-03-01

## 2022-03-01 RX ORDER — METFORMIN HYDROCHLORIDE 1000 MG/1
1000 TABLET ORAL 2 TIMES DAILY
Qty: 180 TABLET | Refills: 1 | Status: SHIPPED | OUTPATIENT
Start: 2022-03-01

## 2022-03-01 RX ORDER — LOVASTATIN 20 MG/1
20 TABLET ORAL NIGHTLY
Qty: 90 TABLET | Refills: 1 | Status: SHIPPED | OUTPATIENT
Start: 2022-03-01

## 2022-03-01 RX ORDER — MONTELUKAST SODIUM 10 MG/1
10 TABLET ORAL NIGHTLY
Qty: 90 TABLET | Refills: 2 | Status: ON HOLD | OUTPATIENT
Start: 2022-03-01 | End: 2023-01-28 | Stop reason: HOSPADM

## 2022-03-01 RX ORDER — GLIPIZIDE 10 MG/1
10 TABLET ORAL
Qty: 180 TABLET | Refills: 1 | Status: SHIPPED | OUTPATIENT
Start: 2022-03-01 | End: 2023-01-11

## 2022-03-01 RX ORDER — FUROSEMIDE 20 MG/1
20 TABLET ORAL 2 TIMES DAILY
Qty: 180 TABLET | Refills: 1 | Status: SHIPPED | OUTPATIENT
Start: 2022-03-01 | End: 2023-01-11

## 2022-03-01 RX ORDER — ALLOPURINOL 100 MG/1
100 TABLET ORAL DAILY
Qty: 90 TABLET | Refills: 2 | Status: SHIPPED | OUTPATIENT
Start: 2022-03-01

## 2022-03-01 RX ORDER — TAMSULOSIN HYDROCHLORIDE 0.4 MG/1
0.4 CAPSULE ORAL DAILY
Qty: 90 CAPSULE | Refills: 1 | Status: SHIPPED | OUTPATIENT
Start: 2022-03-01

## 2022-03-01 NOTE — PROGRESS NOTES
"              Follow up Clinic Note        Patient Jake Brown is a 72 y.o. male     Chief complaints   Chief Complaint   Patient presents with    Follow-up    Hypertension    Diabetes       Subjectve:          Patient is here for meds refill and lab work. States he is doing ok - no new symptoms except weakness. Wants HH back.       Current Outpatient Medications:     allopurinoL (ZYLOPRIM) 100 MG tablet, Take 1 tablet (100 mg total) by mouth once daily., Disp: 90 tablet, Rfl: 2    amlodipine-benazepril 5-20 mg (LOTREL) 5-20 mg per capsule, TAKE ONE CAPSULE BY MOUTH EVERY DAY, Disp: 90 capsule, Rfl: 3    cetirizine (ZYRTEC) 10 MG tablet, TAKE ONE TABLET BY MOUTH EVERY DAY, Disp: 30 tablet, Rfl: 2    cyproheptadine (PERIACTIN) 4 mg tablet, 1/2 tab po twice daily, Disp: 30 tablet, Rfl: 1    furosemide (LASIX) 20 MG tablet, Take 20 mg by mouth 2 (two) times daily., Disp: , Rfl:     gabapentin (NEURONTIN) 600 MG tablet, TAKE ONE TABLET BY MOUTH TWICE DAILY, Disp: 60 tablet, Rfl: 2    glipiZIDE (GLUCOTROL) 10 MG tablet, Take 10 mg by mouth 2 (two) times daily before meals., Disp: , Rfl:     hydroCHLOROthiazide (HYDRODIURIL) 25 MG tablet, Take 25 mg by mouth once daily., Disp: , Rfl:     latanoprost 0.005 % ophthalmic solution, Place 1 drop into both eyes nightly., Disp: 7.5 mL, Rfl: 3    lovastatin (MEVACOR) 20 MG tablet, Take 20 mg by mouth nightly., Disp: , Rfl:     metFORMIN (GLUCOPHAGE) 1000 MG tablet, Take 1,000 mg by mouth 2 (two) times daily., Disp: , Rfl:     montelukast (SINGULAIR) 10 mg tablet, TAKE ONE TABLET BY MOUTH AT BEDTIME, Disp: 90 tablet, Rfl: 2    nicotine (NICODERM CQ) 21 mg/24 hr, Place 1 patch onto the skin once daily., Disp: 30 patch, Rfl: 1    tamsulosin (FLOMAX) 0.4 mg Cap, Take 1 capsule by mouth., Disp: , Rfl:     TRUE METRIX GLUCOSE TEST STRIP Strp, once daily. Test Blood Sugar, Disp: , Rfl:     ULTICARE PEN NEEDLE 32 gauge x 5/32" Ndle, USE WITH BASAGLAR INSULIN TWICE " DAILY, Disp: , Rfl:      No family history on file.     Past Medical History:   Diagnosis Date    Diabetes         No past surgical history on file.     Social History     Socioeconomic History    Marital status:    Tobacco Use    Smoking status: Current Some Day Smoker    Smokeless tobacco: Never Used   Substance and Sexual Activity    Alcohol use: Never    Drug use: Never        Review of Systems   Constitutional: Negative for activity change, appetite change, chills and fever.   HENT: Negative for congestion, ear pain, postnasal drip and sore throat.    Eyes: Negative for pain, discharge, redness and itching.   Respiratory: Negative for cough, chest tightness and shortness of breath.    Cardiovascular: Negative for chest pain and leg swelling.   Gastrointestinal: Negative for abdominal pain, diarrhea, nausea and vomiting.   Musculoskeletal: Positive for arthralgias and myalgias. Negative for gait problem.   Skin: Negative for color change.   Neurological: Positive for weakness.   Psychiatric/Behavioral: Negative for behavioral problems and sleep disturbance.        There were no vitals taken for this visit.     Physical Exam  Constitutional:       General: He is not in acute distress.     Appearance: Normal appearance.   HENT:      Head: Normocephalic.      Right Ear: External ear normal.      Left Ear: External ear normal.      Nose: Nose normal.      Mouth/Throat:      Mouth: Mucous membranes are moist.   Eyes:      Pupils: Pupils are equal, round, and reactive to light.   Cardiovascular:      Rate and Rhythm: Normal rate and regular rhythm.      Heart sounds: Normal heart sounds.   Pulmonary:      Effort: Pulmonary effort is normal.      Breath sounds: Normal breath sounds.   Abdominal:      General: Abdomen is flat. Bowel sounds are normal.      Palpations: Abdomen is soft.   Musculoskeletal:         General: Tenderness present. Normal range of motion.      Cervical back: Neck supple.   Skin:      General: Skin is warm.   Neurological:      General: No focal deficit present.      Mental Status: He is alert.   Psychiatric:         Mood and Affect: Mood normal.         Behavior: Behavior normal.          Problem List Items Addressed This Visit        Cardiac/Vascular    Essential hypertension    Relevant Medications    lovastatin (MEVACOR) 20 MG tablet    hydroCHLOROthiazide (HYDRODIURIL) 25 MG tablet    amlodipine-benazepril 5-20 mg (LOTREL) 5-20 mg per capsule    CHF (congestive heart failure)    Relevant Medications    amlodipine-benazepril 5-20 mg (LOTREL) 5-20 mg per capsule    furosemide (LASIX) 20 MG tablet       Renal/    CKD (chronic kidney disease)    Benign prostatic hyperplasia without lower urinary tract symptoms    Relevant Medications    tamsulosin (FLOMAX) 0.4 mg Cap       Endocrine    Diabetes - Primary    Current Assessment & Plan     Prescription for diabetic supplies given to pt today.           Relevant Medications    metFORMIN (GLUCOPHAGE) 1000 MG tablet    lovastatin (MEVACOR) 20 MG tablet    glipiZIDE (GLUCOTROL) 10 MG tablet    gabapentin (NEURONTIN) 400 MG capsule    Other Relevant Orders    CBC Auto Differential    Comprehensive Metabolic Panel    Hemoglobin A1C    Urinalysis    Microalbumin/Creatinine Ratio, Urine    Lipid Panel    Hyperthyroidism       GI    GERD (gastroesophageal reflux disease)       Orthopedic    Gout of multiple sites    Relevant Medications    allopurinoL (ZYLOPRIM) 100 MG tablet    gabapentin (NEURONTIN) 400 MG capsule       Other    Weakness    Current Assessment & Plan     Will notify HH           Allergic rhinitis    Relevant Medications    montelukast (SINGULAIR) 10 mg tablet      Other Visit Diagnoses     Screening for malignant neoplasm of prostate        Relevant Orders    PSA, Screening

## 2022-11-09 DIAGNOSIS — Z71.89 COMPLEX CARE COORDINATION: ICD-10-CM

## 2023-01-11 ENCOUNTER — HOSPITAL ENCOUNTER (INPATIENT)
Facility: HOSPITAL | Age: 73
LOS: 5 days | Discharge: NURSING, REHAB OR LONG-TERM CARE | DRG: 683 | End: 2023-01-18
Attending: HOSPITALIST | Admitting: HOSPITALIST
Payer: COMMERCIAL

## 2023-01-11 DIAGNOSIS — N17.9 ACUTE ON CHRONIC RENAL FAILURE: ICD-10-CM

## 2023-01-11 DIAGNOSIS — N17.9 ACUTE RENAL FAILURE SUPERIMPOSED ON CHRONIC KIDNEY DISEASE, UNSPECIFIED CKD STAGE, UNSPECIFIED ACUTE RENAL FAILURE TYPE: ICD-10-CM

## 2023-01-11 DIAGNOSIS — N18.9 ACUTE RENAL FAILURE SUPERIMPOSED ON CHRONIC KIDNEY DISEASE, UNSPECIFIED CKD STAGE, UNSPECIFIED ACUTE RENAL FAILURE TYPE: ICD-10-CM

## 2023-01-11 DIAGNOSIS — R50.9 FEVER: ICD-10-CM

## 2023-01-11 DIAGNOSIS — N18.9 ACUTE ON CHRONIC RENAL FAILURE: ICD-10-CM

## 2023-01-11 DIAGNOSIS — A41.9 SEPSIS DUE TO URINARY TRACT INFECTION: Primary | ICD-10-CM

## 2023-01-11 DIAGNOSIS — N39.0 SEPSIS DUE TO URINARY TRACT INFECTION: Primary | ICD-10-CM

## 2023-01-11 LAB
ALBUMIN SERPL BCP-MCNC: 2.8 G/DL (ref 3.5–5)
ALBUMIN/GLOB SERPL: 0.5 {RATIO}
ALP SERPL-CCNC: 90 U/L (ref 45–115)
ALT SERPL W P-5'-P-CCNC: 8 U/L (ref 16–61)
AMYLASE SERPL-CCNC: 90 U/L (ref 25–115)
ANION GAP SERPL CALCULATED.3IONS-SCNC: 17 MMOL/L (ref 7–16)
AST SERPL W P-5'-P-CCNC: 11 U/L (ref 15–37)
BACTERIA #/AREA URNS HPF: ABNORMAL /HPF
BASOPHILS # BLD AUTO: 0.02 K/UL (ref 0–0.2)
BASOPHILS NFR BLD AUTO: 0.1 % (ref 0–1)
BILIRUB SERPL-MCNC: 1.4 MG/DL (ref ?–1.2)
BILIRUB UR QL STRIP: ABNORMAL
BUN SERPL-MCNC: 64 MG/DL (ref 7–18)
BUN/CREAT SERPL: 13 (ref 6–20)
CALCIUM SERPL-MCNC: 9.1 MG/DL (ref 8.5–10.1)
CHLORIDE SERPL-SCNC: 98 MMOL/L (ref 98–107)
CLARITY UR: CLEAR
CO2 SERPL-SCNC: 24 MMOL/L (ref 21–32)
COLOR UR: YELLOW
CREAT SERPL-MCNC: 4.91 MG/DL (ref 0.7–1.3)
DIFFERENTIAL METHOD BLD: ABNORMAL
EGFR (NO RACE VARIABLE) (RUSH/TITUS): 12 ML/MIN/1.73M²
EOSINOPHIL # BLD AUTO: 0 K/UL (ref 0–0.5)
EOSINOPHIL NFR BLD AUTO: 0 % (ref 1–4)
ERYTHROCYTE [DISTWIDTH] IN BLOOD BY AUTOMATED COUNT: 12.7 % (ref 11.5–14.5)
EST. AVERAGE GLUCOSE BLD GHB EST-MCNC: 120 MG/DL
FLUAV AG UPPER RESP QL IA.RAPID: NEGATIVE
FLUBV AG UPPER RESP QL IA.RAPID: NEGATIVE
GLOBULIN SER-MCNC: 5.3 G/DL (ref 2–4)
GLUCOSE SERPL-MCNC: 184 MG/DL (ref 74–106)
GLUCOSE UR STRIP-MCNC: 100 MG/DL
HBA1C MFR BLD HPLC: 6.2 % (ref 4.5–6.6)
HCT VFR BLD AUTO: 33 % (ref 40–54)
HGB BLD-MCNC: 11.1 G/DL (ref 13.5–18)
KETONES UR STRIP-SCNC: 15 MG/DL
LACTATE SERPL-SCNC: 1.8 MMOL/L (ref 0.4–2)
LACTATE SERPL-SCNC: 2.7 MMOL/L (ref 0.4–2)
LEUKOCYTE ESTERASE UR QL STRIP: NEGATIVE
LIPASE SERPL-CCNC: 105 U/L (ref 73–393)
LYMPHOCYTES # BLD AUTO: 2 K/UL (ref 1–4.8)
LYMPHOCYTES NFR BLD AUTO: 7.2 % (ref 27–41)
LYMPHOCYTES NFR BLD MANUAL: 9 % (ref 27–41)
MCH RBC QN AUTO: 31.6 PG (ref 27–31)
MCHC RBC AUTO-ENTMCNC: 33.6 G/DL (ref 32–36)
MCV RBC AUTO: 94 FL (ref 80–96)
MONOCYTES # BLD AUTO: 2.55 K/UL (ref 0–0.8)
MONOCYTES NFR BLD AUTO: 9.1 % (ref 2–6)
MONOCYTES NFR BLD MANUAL: 12 % (ref 2–6)
MPC BLD CALC-MCNC: 10.9 FL (ref 9.4–12.4)
NEUTROPHILS # BLD AUTO: 23.37 K/UL (ref 1.8–7.7)
NEUTROPHILS NFR BLD AUTO: 83.6 % (ref 53–65)
NEUTS SEG NFR BLD MANUAL: 79 % (ref 50–62)
NITRITE UR QL STRIP: NEGATIVE
PH UR STRIP: 5 PH UNITS
PLATELET # BLD AUTO: 162 K/UL (ref 150–400)
POTASSIUM SERPL-SCNC: 4.3 MMOL/L (ref 3.5–5.1)
PROT SERPL-MCNC: 8.1 G/DL (ref 6.4–8.2)
PROT UR QL STRIP: >=300
RBC # BLD AUTO: 3.51 M/UL (ref 4.6–6.2)
RBC # UR STRIP: ABNORMAL /UL
RBC #/AREA URNS HPF: ABNORMAL /HPF
SARS-COV+SARS-COV-2 AG RESP QL IA.RAPID: NEGATIVE
SODIUM SERPL-SCNC: 135 MMOL/L (ref 136–145)
SP GR UR STRIP: >=1.03
SQUAMOUS #/AREA URNS LPF: ABNORMAL /LPF
UROBILINOGEN UR STRIP-ACNC: 4 MG/DL
WBC # BLD AUTO: 27.94 K/UL (ref 4.5–11)
WBC #/AREA URNS HPF: ABNORMAL /HPF

## 2023-01-11 PROCEDURE — G0378 HOSPITAL OBSERVATION PER HR: HCPCS

## 2023-01-11 PROCEDURE — 87428 SARSCOV & INF VIR A&B AG IA: CPT | Performed by: NURSE PRACTITIONER

## 2023-01-11 PROCEDURE — 82150 ASSAY OF AMYLASE: CPT | Performed by: NURSE PRACTITIONER

## 2023-01-11 PROCEDURE — 25000003 PHARM REV CODE 250: Performed by: NURSE PRACTITIONER

## 2023-01-11 PROCEDURE — 96372 THER/PROPH/DIAG INJ SC/IM: CPT | Performed by: NURSE PRACTITIONER

## 2023-01-11 PROCEDURE — 94761 N-INVAS EAR/PLS OXIMETRY MLT: CPT

## 2023-01-11 PROCEDURE — 27000958

## 2023-01-11 PROCEDURE — 99285 PR EMERGENCY DEPT VISIT,LEVEL V: ICD-10-PCS | Mod: EDII,,, | Performed by: NURSE PRACTITIONER

## 2023-01-11 PROCEDURE — 87040 BLOOD CULTURE FOR BACTERIA: CPT | Mod: 91 | Performed by: NURSE PRACTITIONER

## 2023-01-11 PROCEDURE — 63600175 PHARM REV CODE 636 W HCPCS: Performed by: NURSE PRACTITIONER

## 2023-01-11 PROCEDURE — 36415 COLL VENOUS BLD VENIPUNCTURE: CPT | Performed by: NURSE PRACTITIONER

## 2023-01-11 PROCEDURE — 87077 CULTURE AEROBIC IDENTIFY: CPT | Performed by: NURSE PRACTITIONER

## 2023-01-11 PROCEDURE — 83605 ASSAY OF LACTIC ACID: CPT | Mod: 91 | Performed by: NURSE PRACTITIONER

## 2023-01-11 PROCEDURE — 96375 TX/PRO/DX INJ NEW DRUG ADDON: CPT | Mod: 59

## 2023-01-11 PROCEDURE — 85025 COMPLETE CBC W/AUTO DIFF WBC: CPT | Performed by: NURSE PRACTITIONER

## 2023-01-11 PROCEDURE — 99285 EMERGENCY DEPT VISIT HI MDM: CPT | Mod: EDII,,, | Performed by: NURSE PRACTITIONER

## 2023-01-11 PROCEDURE — 81001 URINALYSIS AUTO W/SCOPE: CPT | Performed by: NURSE PRACTITIONER

## 2023-01-11 PROCEDURE — 80053 COMPREHEN METABOLIC PANEL: CPT | Performed by: NURSE PRACTITIONER

## 2023-01-11 PROCEDURE — 83690 ASSAY OF LIPASE: CPT | Performed by: NURSE PRACTITIONER

## 2023-01-11 PROCEDURE — 83036 HEMOGLOBIN GLYCOSYLATED A1C: CPT | Performed by: NURSE PRACTITIONER

## 2023-01-11 PROCEDURE — 87186 SC STD MICRODIL/AGAR DIL: CPT | Performed by: NURSE PRACTITIONER

## 2023-01-11 PROCEDURE — 96365 THER/PROPH/DIAG IV INF INIT: CPT

## 2023-01-11 PROCEDURE — 96361 HYDRATE IV INFUSION ADD-ON: CPT | Mod: 59

## 2023-01-11 PROCEDURE — 99285 EMERGENCY DEPT VISIT HI MDM: CPT | Mod: 25

## 2023-01-11 PROCEDURE — 51702 INSERT TEMP BLADDER CATH: CPT

## 2023-01-11 RX ORDER — ENOXAPARIN SODIUM 100 MG/ML
30 INJECTION SUBCUTANEOUS EVERY 24 HOURS
Status: DISCONTINUED | OUTPATIENT
Start: 2023-01-11 | End: 2023-01-18 | Stop reason: HOSPADM

## 2023-01-11 RX ORDER — DORZOLAMIDE HYDROCHLORIDE AND TIMOLOL MALEATE 20; 5 MG/ML; MG/ML
1 SOLUTION/ DROPS OPHTHALMIC 3 TIMES DAILY
Status: DISCONTINUED | OUTPATIENT
Start: 2023-01-11 | End: 2023-01-18 | Stop reason: HOSPADM

## 2023-01-11 RX ORDER — ONDANSETRON 2 MG/ML
4 INJECTION INTRAMUSCULAR; INTRAVENOUS
Status: COMPLETED | OUTPATIENT
Start: 2023-01-11 | End: 2023-01-11

## 2023-01-11 RX ORDER — MAG HYDROX/ALUMINUM HYD/SIMETH 200-200-20
30 SUSPENSION, ORAL (FINAL DOSE FORM) ORAL EVERY 6 HOURS PRN
Status: DISCONTINUED | OUTPATIENT
Start: 2023-01-11 | End: 2023-01-18 | Stop reason: HOSPADM

## 2023-01-11 RX ORDER — INSULIN ASPART 100 [IU]/ML
0-5 INJECTION, SOLUTION INTRAVENOUS; SUBCUTANEOUS EVERY 6 HOURS PRN
Status: DISCONTINUED | OUTPATIENT
Start: 2023-01-11 | End: 2023-01-15

## 2023-01-11 RX ORDER — ALLOPURINOL 100 MG/1
100 TABLET ORAL DAILY
Status: DISCONTINUED | OUTPATIENT
Start: 2023-01-12 | End: 2023-01-12

## 2023-01-11 RX ORDER — BRIMONIDINE TARTRATE 2 MG/ML
1 SOLUTION/ DROPS OPHTHALMIC EVERY 8 HOURS
Status: DISCONTINUED | OUTPATIENT
Start: 2023-01-11 | End: 2023-01-18 | Stop reason: HOSPADM

## 2023-01-11 RX ORDER — ADHESIVE BANDAGE
30 BANDAGE TOPICAL DAILY PRN
Status: DISCONTINUED | OUTPATIENT
Start: 2023-01-11 | End: 2023-01-18 | Stop reason: HOSPADM

## 2023-01-11 RX ORDER — ONDANSETRON 2 MG/ML
4 INJECTION INTRAMUSCULAR; INTRAVENOUS EVERY 8 HOURS PRN
Status: DISCONTINUED | OUTPATIENT
Start: 2023-01-11 | End: 2023-01-18 | Stop reason: HOSPADM

## 2023-01-11 RX ORDER — LATANOPROST 50 UG/ML
1 SOLUTION/ DROPS OPHTHALMIC NIGHTLY
Status: DISCONTINUED | OUTPATIENT
Start: 2023-01-11 | End: 2023-01-12

## 2023-01-11 RX ORDER — SODIUM CHLORIDE 9 MG/ML
1000 INJECTION, SOLUTION INTRAVENOUS CONTINUOUS
Status: ACTIVE | OUTPATIENT
Start: 2023-01-11 | End: 2023-01-12

## 2023-01-11 RX ORDER — ACETAMINOPHEN 500 MG
1000 TABLET ORAL
Status: COMPLETED | OUTPATIENT
Start: 2023-01-11 | End: 2023-01-11

## 2023-01-11 RX ORDER — SENNOSIDES 8.6 MG/1
8.6 TABLET ORAL DAILY PRN
Status: DISCONTINUED | OUTPATIENT
Start: 2023-01-11 | End: 2023-01-18 | Stop reason: HOSPADM

## 2023-01-11 RX ORDER — DORZOLAMIDE HYDROCHLORIDE AND TIMOLOL MALEATE 20; 5 MG/ML; MG/ML
1 SOLUTION/ DROPS OPHTHALMIC 3 TIMES DAILY
COMMUNITY

## 2023-01-11 RX ORDER — PRAVASTATIN SODIUM 10 MG/1
20 TABLET ORAL DAILY
Status: DISCONTINUED | OUTPATIENT
Start: 2023-01-12 | End: 2023-01-12

## 2023-01-11 RX ORDER — BRIMONIDINE TARTRATE 2 MG/ML
1 SOLUTION/ DROPS OPHTHALMIC EVERY 8 HOURS
COMMUNITY

## 2023-01-11 RX ORDER — SODIUM CHLORIDE 0.9 % (FLUSH) 0.9 %
10 SYRINGE (ML) INJECTION
Status: DISCONTINUED | OUTPATIENT
Start: 2023-01-11 | End: 2023-01-18 | Stop reason: HOSPADM

## 2023-01-11 RX ORDER — GLUCAGON 1 MG
1 KIT INJECTION
Status: DISCONTINUED | OUTPATIENT
Start: 2023-01-11 | End: 2023-01-18 | Stop reason: HOSPADM

## 2023-01-11 RX ORDER — MONTELUKAST SODIUM 10 MG/1
10 TABLET ORAL NIGHTLY
Status: DISCONTINUED | OUTPATIENT
Start: 2023-01-11 | End: 2023-01-12

## 2023-01-11 RX ORDER — GABAPENTIN 400 MG/1
400 CAPSULE ORAL 2 TIMES DAILY
Status: DISCONTINUED | OUTPATIENT
Start: 2023-01-11 | End: 2023-01-12

## 2023-01-11 RX ORDER — TAMSULOSIN HYDROCHLORIDE 0.4 MG/1
0.4 CAPSULE ORAL DAILY
Status: DISCONTINUED | OUTPATIENT
Start: 2023-01-12 | End: 2023-01-12

## 2023-01-11 RX ORDER — TALC
6 POWDER (GRAM) TOPICAL NIGHTLY PRN
Status: DISCONTINUED | OUTPATIENT
Start: 2023-01-11 | End: 2023-01-18 | Stop reason: HOSPADM

## 2023-01-11 RX ORDER — ACETAMINOPHEN 325 MG/1
650 TABLET ORAL EVERY 4 HOURS PRN
Status: DISCONTINUED | OUTPATIENT
Start: 2023-01-11 | End: 2023-01-18 | Stop reason: HOSPADM

## 2023-01-11 RX ADMIN — SODIUM CHLORIDE 250 ML: 9 INJECTION, SOLUTION INTRAVENOUS at 03:01

## 2023-01-11 RX ADMIN — SODIUM CHLORIDE 1000 ML: 9 INJECTION, SOLUTION INTRAVENOUS at 07:01

## 2023-01-11 RX ADMIN — GABAPENTIN 400 MG: 400 CAPSULE ORAL at 08:01

## 2023-01-11 RX ADMIN — ONDANSETRON 4 MG: 2 INJECTION INTRAMUSCULAR; INTRAVENOUS at 03:01

## 2023-01-11 RX ADMIN — ENOXAPARIN SODIUM 30 MG: 100 INJECTION SUBCUTANEOUS at 08:01

## 2023-01-11 RX ADMIN — ACETAMINOPHEN 1000 MG: 500 TABLET, FILM COATED ORAL at 03:01

## 2023-01-11 RX ADMIN — CEFTRIAXONE SODIUM 1 G: 1 INJECTION, POWDER, FOR SOLUTION INTRAMUSCULAR; INTRAVENOUS at 05:01

## 2023-01-11 RX ADMIN — INSULIN DETEMIR 35 UNITS: 100 INJECTION, SOLUTION SUBCUTANEOUS at 08:01

## 2023-01-11 RX ADMIN — BRIMONIDINE TARTRATE 1 DROP: 2 SOLUTION/ DROPS OPHTHALMIC at 10:01

## 2023-01-11 RX ADMIN — MONTELUKAST 10 MG: 10 TABLET, FILM COATED ORAL at 08:01

## 2023-01-11 RX ADMIN — SODIUM CHLORIDE 250 ML: 9 INJECTION, SOLUTION INTRAVENOUS at 04:01

## 2023-01-11 NOTE — ED PROVIDER NOTES
Encounter Date: 1/11/2023       History     Chief Complaint   Patient presents with    Fever    Vomiting    Diarrhea     2 days     Jake Brown is a 73 y.o. Black or  /male presenting to ED with N/V/D and fever for 2 days. He was sent in by  nurse. He denies pain, specifically abd pain. He had a large diarrhea BM in WC on arrival to ED. Has not taken anything PTA for fever. He appears not to feel well but currently in NAD. Tachycardic and febrile. Otherwise, VSS at this time.        The history is provided by the patient.   Fever  Primary symptoms of the febrile illness include fever, fatigue, nausea, vomiting and diarrhea. Primary symptoms do not include cough, shortness of breath, abdominal pain, dysuria, altered mental status or myalgias. The current episode started 2 days ago. This is a new problem. The problem has not changed since onset.  The maximum temperature recorded prior to his arrival was unknown.   The fatigue began yesterday. The fatigue has been unchanged since its onset.   Nausea began 2 days ago. The nausea is exacerbated by food.   The vomiting began 2 days ago. Vomiting occurs 2 to 5 times per day. The emesis contains stomach contents.   The diarrhea began 2 days ago. The diarrhea is watery. The diarrhea occurs 2 - 4 times per day.   Emesis   Associated symptoms include chills, diarrhea and a fever. Pertinent negatives include no abdominal pain, no cough and no myalgias.   Diarrhea   Associated symptoms include chills, a fever and vomiting. Pertinent negatives include no abdominal pain, coughing or myalgias.   Review of patient's allergies indicates:  No Known Allergies  Past Medical History:   Diagnosis Date    CHF (congestive heart failure)     Diabetes     Hypertension      History reviewed. No pertinent surgical history.  History reviewed. No pertinent family history.  Social History     Tobacco Use    Smoking status: Some Days    Smokeless tobacco: Never   Substance Use  Topics    Alcohol use: Never    Drug use: Never     Review of Systems   Constitutional:  Positive for appetite change, chills, fatigue and fever.   HENT:  Negative for congestion, postnasal drip, sinus pressure, sinus pain and sore throat.    Respiratory:  Negative for cough and shortness of breath.    Cardiovascular:  Negative for chest pain and palpitations.   Gastrointestinal:  Positive for diarrhea, nausea and vomiting. Negative for abdominal pain.   Genitourinary:  Positive for decreased urine volume. Negative for dysuria, frequency and urgency.   Musculoskeletal:  Negative for myalgias, neck pain and neck stiffness.   Skin:  Negative for wound.   Neurological:  Positive for weakness (generalized). Negative for dizziness, syncope and light-headedness.   Psychiatric/Behavioral:  Negative for confusion. The patient is not nervous/anxious.    All other systems reviewed and are negative.    Physical Exam     Initial Vitals [01/11/23 1508]   BP Pulse Resp Temp SpO2   123/72 (!) 122 20 (!) 102.8 °F (39.3 °C) 98 %      MAP       --         Physical Exam    Nursing note and vitals reviewed.  Constitutional: He appears well-developed and well-nourished. He is not diaphoretic. He is cooperative. No distress.   HENT:   Head: Normocephalic.   Mouth/Throat: Uvula is midline. Mucous membranes are dry.   Eyes: Conjunctivae and EOM are normal. Pupils are equal, round, and reactive to light. No scleral icterus.   Neck: Neck supple.   Normal range of motion.  Cardiovascular:  Regular rhythm, normal heart sounds and intact distal pulses.   Tachycardia present.         Pulmonary/Chest: Breath sounds normal. No respiratory distress. He exhibits no tenderness.   Abdominal: Abdomen is soft. He exhibits no distension. Bowel sounds are increased. There is no abdominal tenderness. There is no rebound, no guarding, no tenderness at McBurney's point and negative Greenberg's sign.   Musculoskeletal:         General: No tenderness or edema.  Normal range of motion.      Cervical back: Normal range of motion and neck supple.     Lymphadenopathy:     He has no cervical adenopathy.   Neurological: He is alert and oriented to person, place, and time. He has normal strength. GCS score is 15. GCS eye subscore is 4. GCS verbal subscore is 5. GCS motor subscore is 6.   Skin: Skin is warm and dry. Capillary refill takes less than 2 seconds.   Psychiatric: He has a normal mood and affect.       Medical Screening Exam   See Full Note    ED Course   Procedures  Labs Reviewed   COMPREHENSIVE METABOLIC PANEL - Abnormal; Notable for the following components:       Result Value    Sodium 135 (*)     Anion Gap 17 (*)     Glucose 184 (*)     BUN 64 (*)     Creatinine 4.91 (*)     Albumin 2.8 (*)     Globulin 5.3 (*)     Bilirubin, Total 1.4 (*)     ALT 8 (*)     AST 11 (*)     eGFR 12 (*)     All other components within normal limits   CBC WITH DIFFERENTIAL - Abnormal; Notable for the following components:    WBC 27.94 (*)     RBC 3.51 (*)     Hemoglobin 11.1 (*)     Hematocrit 33.0 (*)     MCH 31.6 (*)     Neutrophils % 83.6 (*)     Lymphocytes % 7.2 (*)     Neutrophils, Abs 23.37 (*)     Monocytes % 9.1 (*)     Eosinophils % 0.0 (*)     Monocytes, Absolute 2.55 (*)     All other components within normal limits   URINALYSIS, REFLEX TO URINE CULTURE - Abnormal; Notable for the following components:    Protein, UA >=300 (*)     Glucose,  (*)     Ketones, UA 15 (*)     Urobilinogen, UA 4.0 (*)     Bilirubin, UA Moderate (*)     Blood, UA Moderate (*)     Specific Gravity, UA >=1.030 (*)     All other components within normal limits   MANUAL DIFFERENTIAL - Abnormal; Notable for the following components:    Segmented Neutrophils, Man % 79 (*)     Lymphocytes, Man % 9 (*)     Monocytes, Man % 12 (*)     All other components within normal limits   LACTIC ACID, PLASMA - Abnormal; Notable for the following components:    Lactic Acid 2.7 (*)     All other components within  normal limits   URINALYSIS, MICROSCOPIC - Abnormal; Notable for the following components:    WBC, UA 5-10 (*)     RBC, UA 3-5 (*)     Bacteria, UA Loaded (*)     Squamous Epithelial Cells, UA Few (*)     All other components within normal limits   SARS-COV2 (COVID) W/ FLU ANTIGEN - Normal    Narrative:     Negative SARS-CoV results should not be used as the sole basis for treatment or patient management decisions; negative results should be considered in the context of a patient's recent exposures, history and the presene of clinical signs and symptoms consistent with COVID-19.  Negative results should be treated as presumptive and confirmed by molecular assay, if necessary for patient management.   AMYLASE - Normal   LIPASE - Normal   CULTURE, BLOOD   CULTURE, BLOOD   CULTURE, URINE   CBC W/ AUTO DIFFERENTIAL    Narrative:     The following orders were created for panel order CBC auto differential.  Procedure                               Abnormality         Status                     ---------                               -----------         ------                     CBC with Differential[[id number]]        Abnormal            Final result               Manual Differential[[id number]]          Abnormal            Final result                 Please view results for these tests on the individual orders.          Imaging Results              CT Abdomen Pelvis  Without Contrast (Final result)  Result time 01/11/23 16:53:22      Final result by Miguel Ángel Chaparro II, MD (01/11/23 16:53:22)                   Impression:      Layering gallbladder density could indicate multiple small gallbladder calculi.  No other acute findings.      Electronically signed by: Miguel Ángel Chaparro  Date:    01/11/2023  Time:    16:53               Narrative:    EXAMINATION:  CT ABDOMEN PELVIS WITHOUT CONTRAST    CLINICAL HISTORY:  Nausea/vomiting;    TECHNIQUE:  Axial CT imaging of the abdomen and pelvis is performed without contrast.    CT  dose reduction technique used - Dose Rite and tube current modulation.    COMPARISON:  None available    FINDINGS:  Cardiac and lung bases are within normal limits    CT abdomen: Layering density in the gallbladder.  The liver, spleen, pancreas and adrenal glands are normal in size and density.  No evidence of focal lesion is demonstrated in these solid organs.    Kidneys are normal in size and density.  No evidence of hydronephrosis or nephrolithiasis is seen.    The bowel caliber is normal and no wall thickening or adjacent inflammatory change is seen.  No evidence of free fluid or free air is present.  Appendix is normal.    CT pelvis: The bowel and bladder appear within normal limits.  The prostate gland is enlarged with small amount of calcification.                                       X-Ray Chest AP Portable (Final result)  Result time 01/11/23 16:51:04      Final result by Miguel Ángel Chaparro II, MD (01/11/23 16:51:04)                   Impression:      Findings suggest mild cardiac decompensation and / or pneumonitis.      Electronically signed by: Miguel Ángel Chaparro  Date:    01/11/2023  Time:    16:51               Narrative:    EXAMINATION:  XR CHEST AP PORTABLE    CLINICAL HISTORY:  Fever, unspecified    COMPARISON:  28 March 2016    FINDINGS:  The heart and mediastinum are stable in size and configuration.  The pulmonary vascularity is slightly increased with bilateral increased interstitial lung density.  Similar changes on the previous.  No other lung infiltrates, effusions, pneumothorax or other abnormality is demonstrated.                                       Medications   cefTRIAXone (ROCEPHIN) 1 g in dextrose 5 % in water (D5W) 5 % 50 mL IVPB (MB+) (1 g Intravenous New Bag 1/11/23 1731)   sodium chloride 0.9% bolus 250 mL 250 mL (0 mLs Intravenous Stopped 1/11/23 1613)   acetaminophen tablet 1,000 mg (1,000 mg Oral Given 1/11/23 1540)   ondansetron injection 4 mg (4 mg Intravenous Given 1/11/23  1540)   sodium chloride 0.9% bolus 250 mL 250 mL (250 mLs Intravenous New Bag 1/11/23 1644)     Medical Decision Making:   Initial Assessment:   Chronically ill appearing 72 y/o male with fever, N/V/D x2 days. AAOx3 and in NAD. Tachycardic and febrile. Verbalized that he thinks he has Flu or COVID but without upper resp sx, cough, or abd pain. Abd non-tender on palpation. Diarrhea BM on arrival.   Differential Diagnosis:   COVID/Flu  Gastroenteritis  UTI  Sepsis  Clinical Tests:   Lab Tests: Ordered and Reviewed  The following lab test(s) were unremarkable: CBC, CMP, Lactate and Lipase       <> Summary of Lab: CBC- WBC 27.9  CMP- Creatinine 4.97, BUN 64, Na 135, Albumin 2.8  Amylase/Lipase- WNL  Lactatic Acid- 2.7  UA- Loaded bacteria  Blood x2 cx pending  CT abd/pelvis w/o contrast- Layering gallbladder density could indicate multiple small gallbladder calculi. No other acute findings.  CXR- Findings suggest mild cardiac decompensation and/or pneumonitis  Radiological Study: Ordered and Reviewed  ED Management:  NS 500cc bolus- HR improved, fever trending down  Tylenol 1 gm PO- fever trending down  Zofran 4 mg IV- nausea subsided  Rocephin 1 gm IVPB for UTI  Case discussed with Dr Godwin, [facility] Hospitalist. Will admit to observation  COVID/Flu- negative  Gastroenteritis- not evident on CT abd/pelvis  UTI- Loaded bacteria, 5-10 WBC, 3-5 RBC, cx pending  Sepsis- lactate 2.7, WBC 27, tachycardia, hypotension, fever           ED Course as of 01/11/23 1755   Wed Jan 11, 2023   1616 Labs reviewed from 1/9: BUN 59, Creatinine 3.10, Potassium 5.3 [LP]   1652 Patient unable to provide urine sample. Will get cath UA [LP]   1724 Case discussed with Dr Godwin. He is in agreement with admission to observation for urosepsis. Suggests gentle fluid rehydration NS 50cc/hr, Rocephin, telemetry monitoring and repeat labs in AM. Avoid motrin/Toradol to treat temp. May use Tylenol 650mg Q4hr PRN. Bai for strict I&O monitoring with  LIZANDRO and hx of CHF [LP]   3970 Discussed plan to admit with patient. He v/u and is in agreement. [LP]      ED Course User Index  [LP] SHIMA Mcnair          Clinical Impression:   Final diagnoses:  [N17.9, N18.9] Acute renal failure superimposed on chronic kidney disease, unspecified CKD stage, unspecified acute renal failure type  [R50.9] Fever  [A41.9, N39.0] Sepsis due to urinary tract infection (Primary)        ED Disposition Condition    Observation                 SHIMA Mcnair  01/11/23 8988

## 2023-01-11 NOTE — Clinical Note
Diagnosis: Sepsis due to urinary tract infection [271655]   Future Attending Provider: JURGEN HAYES [[id number]]   Admitting Provider:: JURGEN HAYES [[id number]]   Special Needs:: Fall Risk [15]   Special Needs:: Continuous IV Infusion Other [10]

## 2023-01-11 NOTE — ED TRIAGE NOTES
C/o v/d and fever x 2 days.rcd aaox3 skin w/d to touch resp without labor.incontinent of bm ,cleaned per staff.

## 2023-01-12 PROBLEM — R33.8 URINARY RETENTION DUE TO BENIGN PROSTATIC HYPERPLASIA: Status: ACTIVE | Noted: 2023-01-12

## 2023-01-12 PROBLEM — N17.9 ACUTE ON CHRONIC RENAL FAILURE: Status: ACTIVE | Noted: 2023-01-12

## 2023-01-12 PROBLEM — N30.00 ACUTE CYSTITIS WITHOUT HEMATURIA: Status: ACTIVE | Noted: 2023-01-12

## 2023-01-12 PROBLEM — N18.9 ACUTE ON CHRONIC RENAL FAILURE: Status: ACTIVE | Noted: 2023-01-12

## 2023-01-12 PROBLEM — N40.1 URINARY RETENTION DUE TO BENIGN PROSTATIC HYPERPLASIA: Status: ACTIVE | Noted: 2023-01-12

## 2023-01-12 LAB
ANION GAP SERPL CALCULATED.3IONS-SCNC: 14 MMOL/L (ref 7–16)
BASOPHILS # BLD AUTO: 0.02 K/UL (ref 0–0.2)
BASOPHILS NFR BLD AUTO: 0.1 % (ref 0–1)
BUN SERPL-MCNC: 64 MG/DL (ref 7–18)
BUN/CREAT SERPL: 16 (ref 6–20)
CALCIUM SERPL-MCNC: 8.8 MG/DL (ref 8.5–10.1)
CHLORIDE SERPL-SCNC: 101 MMOL/L (ref 98–107)
CO2 SERPL-SCNC: 26 MMOL/L (ref 21–32)
CREAT SERPL-MCNC: 3.91 MG/DL (ref 0.7–1.3)
DIFFERENTIAL METHOD BLD: ABNORMAL
EGFR (NO RACE VARIABLE) (RUSH/TITUS): 15 ML/MIN/1.73M²
EOSINOPHIL # BLD AUTO: 0.02 K/UL (ref 0–0.5)
EOSINOPHIL NFR BLD AUTO: 0.1 % (ref 1–4)
ERYTHROCYTE [DISTWIDTH] IN BLOOD BY AUTOMATED COUNT: 12 % (ref 11.5–14.5)
GLUCOSE SERPL-MCNC: 102 MG/DL (ref 70–105)
GLUCOSE SERPL-MCNC: 107 MG/DL (ref 74–106)
GLUCOSE SERPL-MCNC: 128 MG/DL (ref 70–105)
GLUCOSE SERPL-MCNC: 156 MG/DL (ref 70–105)
GLUCOSE SERPL-MCNC: 176 MG/DL (ref 70–105)
HCT VFR BLD AUTO: 28.5 % (ref 40–54)
HGB BLD-MCNC: 9.8 G/DL (ref 13.5–18)
LYMPHOCYTES # BLD AUTO: 1.46 K/UL (ref 1–4.8)
LYMPHOCYTES NFR BLD AUTO: 6.2 % (ref 27–41)
MCH RBC QN AUTO: 31.7 PG (ref 27–31)
MCHC RBC AUTO-ENTMCNC: 34.4 G/DL (ref 32–36)
MCV RBC AUTO: 92.2 FL (ref 80–96)
MONOCYTES # BLD AUTO: 1.74 K/UL (ref 0–0.8)
MONOCYTES NFR BLD AUTO: 7.4 % (ref 2–6)
MPC BLD CALC-MCNC: 10.5 FL (ref 9.4–12.4)
NEUTROPHILS # BLD AUTO: 20.25 K/UL (ref 1.8–7.7)
NEUTROPHILS NFR BLD AUTO: 86.2 % (ref 53–65)
PLATELET # BLD AUTO: 136 K/UL (ref 150–400)
POTASSIUM SERPL-SCNC: 4.6 MMOL/L (ref 3.5–5.1)
RBC # BLD AUTO: 3.09 M/UL (ref 4.6–6.2)
SODIUM SERPL-SCNC: 136 MMOL/L (ref 136–145)
TSH SERPL DL<=0.005 MIU/L-ACNC: 0.24 UIU/ML (ref 0.36–3.74)
WBC # BLD AUTO: 23.49 K/UL (ref 4.5–11)

## 2023-01-12 PROCEDURE — 82962 GLUCOSE BLOOD TEST: CPT

## 2023-01-12 PROCEDURE — 25000003 PHARM REV CODE 250: Performed by: NURSE PRACTITIONER

## 2023-01-12 PROCEDURE — 63600175 PHARM REV CODE 636 W HCPCS: Performed by: NURSE PRACTITIONER

## 2023-01-12 PROCEDURE — 99223 PR INITIAL HOSPITAL CARE,LEVL III: ICD-10-PCS | Mod: ,,, | Performed by: HOSPITALIST

## 2023-01-12 PROCEDURE — 99223 1ST HOSP IP/OBS HIGH 75: CPT | Mod: ,,, | Performed by: HOSPITALIST

## 2023-01-12 PROCEDURE — 85025 COMPLETE CBC W/AUTO DIFF WBC: CPT | Performed by: NURSE PRACTITIONER

## 2023-01-12 PROCEDURE — 27000958

## 2023-01-12 PROCEDURE — 96366 THER/PROPH/DIAG IV INF ADDON: CPT

## 2023-01-12 PROCEDURE — 25000003 PHARM REV CODE 250: Performed by: HOSPITALIST

## 2023-01-12 PROCEDURE — 36415 COLL VENOUS BLD VENIPUNCTURE: CPT | Performed by: HOSPITALIST

## 2023-01-12 PROCEDURE — 84443 ASSAY THYROID STIM HORMONE: CPT | Performed by: HOSPITALIST

## 2023-01-12 PROCEDURE — 96372 THER/PROPH/DIAG INJ SC/IM: CPT | Mod: 59 | Performed by: NURSE PRACTITIONER

## 2023-01-12 PROCEDURE — 94761 N-INVAS EAR/PLS OXIMETRY MLT: CPT

## 2023-01-12 PROCEDURE — 36415 COLL VENOUS BLD VENIPUNCTURE: CPT | Performed by: NURSE PRACTITIONER

## 2023-01-12 PROCEDURE — G0378 HOSPITAL OBSERVATION PER HR: HCPCS

## 2023-01-12 PROCEDURE — 80048 BASIC METABOLIC PNL TOTAL CA: CPT | Performed by: NURSE PRACTITIONER

## 2023-01-12 RX ORDER — TAMSULOSIN HYDROCHLORIDE 0.4 MG/1
0.8 CAPSULE ORAL DAILY
Status: DISCONTINUED | OUTPATIENT
Start: 2023-01-13 | End: 2023-01-18 | Stop reason: HOSPADM

## 2023-01-12 RX ORDER — GABAPENTIN 100 MG/1
100 CAPSULE ORAL 2 TIMES DAILY
Status: DISCONTINUED | OUTPATIENT
Start: 2023-01-12 | End: 2023-01-18 | Stop reason: HOSPADM

## 2023-01-12 RX ADMIN — BRIMONIDINE TARTRATE 1 DROP: 2 SOLUTION/ DROPS OPHTHALMIC at 06:01

## 2023-01-12 RX ADMIN — BRIMONIDINE TARTRATE 1 DROP: 2 SOLUTION/ DROPS OPHTHALMIC at 10:01

## 2023-01-12 RX ADMIN — CEFTRIAXONE SODIUM 1 G: 1 INJECTION, POWDER, FOR SOLUTION INTRAMUSCULAR; INTRAVENOUS at 10:01

## 2023-01-12 RX ADMIN — DORZOLAMIDE HYDROCHLORIDE AND TIMOLOL MALEATE 1 DROP: 20; 5 SOLUTION/ DROPS OPHTHALMIC at 09:01

## 2023-01-12 RX ADMIN — DORZOLAMIDE HYDROCHLORIDE AND TIMOLOL MALEATE 1 DROP: 20; 5 SOLUTION/ DROPS OPHTHALMIC at 02:01

## 2023-01-12 RX ADMIN — GABAPENTIN 100 MG: 100 CAPSULE ORAL at 08:01

## 2023-01-12 RX ADMIN — TAMSULOSIN HYDROCHLORIDE 0.4 MG: 0.4 CAPSULE ORAL at 09:01

## 2023-01-12 RX ADMIN — ENOXAPARIN SODIUM 30 MG: 100 INJECTION SUBCUTANEOUS at 05:01

## 2023-01-12 RX ADMIN — DORZOLAMIDE HYDROCHLORIDE AND TIMOLOL MALEATE 1 DROP: 20; 5 SOLUTION/ DROPS OPHTHALMIC at 08:01

## 2023-01-12 RX ADMIN — BRIMONIDINE TARTRATE 1 DROP: 2 SOLUTION/ DROPS OPHTHALMIC at 02:01

## 2023-01-12 NOTE — ASSESSMENT & PLAN NOTE
Patient is identified as having unknown type heart failure that is Chronic. CHF is currently controlled. Latest ECHO performed and demonstrates- No results found for this or any previous visit.  . Continue Beta Blocker and monitor clinical status closely. Monitor on telemetry. Patient is off CHF pathway.  Monitor strict Is&Os and daily weights.  Place on fluid restriction of No. Continue to stress to patient importance of self efficacy and  on diet for CHF. Last BNP reviewed- and noted below No results for input(s): BNP, BNPTRIAGEBLO in the last 168 hours..

## 2023-01-12 NOTE — PLAN OF CARE
Problem: Infection  Goal: Absence of Infection Signs and Symptoms  Outcome: Ongoing, Progressing     Problem: Adult Inpatient Plan of Care  Goal: Plan of Care Review  Outcome: Ongoing, Progressing  Goal: Patient-Specific Goal (Individualized)  Outcome: Ongoing, Progressing  Goal: Absence of Hospital-Acquired Illness or Injury  Outcome: Ongoing, Progressing  Goal: Optimal Comfort and Wellbeing  Outcome: Ongoing, Progressing  Goal: Readiness for Transition of Care  Outcome: Ongoing, Progressing     Problem: Diabetes Comorbidity  Goal: Blood Glucose Level Within Targeted Range  Outcome: Ongoing, Progressing     Problem: Skin Injury Risk Increased  Goal: Skin Health and Integrity  Outcome: Ongoing, Progressing     Problem: Fall Injury Risk  Goal: Absence of Fall and Fall-Related Injury  Outcome: Ongoing, Progressing

## 2023-01-12 NOTE — ASSESSMENT & PLAN NOTE
Patient's FSGs are controlled on current medication regimen.  Last A1c reviewed-   Lab Results   Component Value Date    HGBA1C 6.2 01/11/2023     Most recent fingerstick glucose reviewed- No results for input(s): POCTGLUCOSE in the last 24 hours.  Current correctional scale  Low  Maintain anti-hyperglycemic dose as follows-   Antihyperglycemics (From admission, onward)    Start     Stop Route Frequency Ordered    01/11/23 1847  insulin aspart U-100 injection 0-5 Units         -- SubQ Every 6 hours PRN 01/11/23 1846        Hold Oral hypoglycemics while patient is in the hospital.

## 2023-01-12 NOTE — ASSESSMENT & PLAN NOTE
Continue BPH meds, has mederos now, will need to make sure he can urinate without or DC home with mederos.

## 2023-01-12 NOTE — H&P
[facility] Medicine  History & Physical    Patient Name: Jake Brown  MRN: [id number]  Patient Class: OP- Observation  Admission Date: 1/11/2023  Attending Physician: Doron Godwin DO   Primary Care Provider: Osvaldo Hodges MD         Patient information was obtained from patient and ER records.     Subjective:     Principal Problem:<principal problem not specified>    Chief Complaint:   Chief Complaint   Patient presents with    Fever    Vomiting    Diarrhea     2 days        HPI: Mr Roland is a 72 y/o male that presented to [facility] ED with fever, N/V/D x2 days. He was found to have Urosepsis and LIZANDRO. WBC 27.9. Lactate 2.7. Creatinine 4.9, BUN 69. This is up from recent labs on 1/9 with creatinine 3.10. CT abdomen pelvis showed layering densities in gallbladder likely small stones. Amylase/Lipase WNL. Fever up to 102.8, improved to 101 with Tylenol. HR up to 122, improved with IV fluids. He had 2 episodes of hypotension while in ED but resolved with IV fluids. He exhibited no abd tenderness and Greenberg's sign negative. given Zofran and nausea subsided. No vomiting while in ED.     Code Status: FULL CODE      Past Medical History:   Diagnosis Date    CHF (congestive heart failure)     Diabetes     Hypertension        History reviewed. No pertinent surgical history.    Review of patient's allergies indicates:  No Known Allergies    No current facility-administered medications on file prior to encounter.     Current Outpatient Medications on File Prior to Encounter   Medication Sig    allopurinoL (ZYLOPRIM) 100 MG tablet Take 1 tablet (100 mg total) by mouth once daily.    amlodipine-benazepril 5-20 mg (LOTREL) 5-20 mg per capsule Take 1 capsule by mouth once daily.    brimonidine 0.2% (ALPHAGAN) 0.2 % Drop 1 drop every 8 (eight) hours.    cetirizine (ZYRTEC) 10 MG tablet TAKE ONE TABLET BY MOUTH EVERY DAY    cyproheptadine (PERIACTIN) 4 mg tablet 1/2 tab po twice  "daily    docosahexaenoic acid-epa 120-180 mg Cap Take by mouth once daily.    dorzolamide-timolol 2-0.5% (COSOPT) 22.3-6.8 mg/mL ophthalmic solution 1 drop 3 (three) times daily.    gabapentin (NEURONTIN) 400 MG capsule Take 1 capsule (400 mg total) by mouth 2 (two) times daily.    insulin glargine,hum.rec.anlog (BASAGLAR KWIKPEN U-100 INSULIN SUBQ) Inject 20 Units into the skin once daily.    insulin glargine,hum.rec.anlog (BASAGLAR KWIKPEN U-100 INSULIN SUBQ) Inject 35 Units into the skin every evening.    latanoprost 0.005 % ophthalmic solution Place 1 drop into both eyes nightly.    lovastatin (MEVACOR) 20 MG tablet Take 1 tablet (20 mg total) by mouth nightly.    metFORMIN (GLUCOPHAGE) 1000 MG tablet Take 1 tablet (1,000 mg total) by mouth 2 (two) times daily.    montelukast (SINGULAIR) 10 mg tablet Take 1 tablet (10 mg total) by mouth nightly.    tamsulosin (FLOMAX) 0.4 mg Cap Take 1 capsule (0.4 mg total) by mouth once daily.    TRUE METRIX GLUCOSE TEST STRIP Strp once daily. Test Blood Sugar    ULTICARE PEN NEEDLE 32 gauge x 5/32" Ndle USE WITH BASAGLAR INSULIN TWICE DAILY     Family History    None       Tobacco Use    Smoking status: Some Days    Smokeless tobacco: Never   Substance and Sexual Activity    Alcohol use: Never    Drug use: Never    Sexual activity: Not on file     Review of Systems   Constitutional:  Negative for appetite change, chills and fever.   Respiratory:  Negative for cough, shortness of breath and wheezing.    Cardiovascular:  Negative for chest pain, palpitations and leg swelling.   Gastrointestinal:  Negative for abdominal distention, diarrhea, nausea and vomiting.   Genitourinary:  Positive for decreased urine volume, difficulty urinating, dysuria and urgency.   Skin:  Negative for rash.   Neurological:  Negative for dizziness, seizures and syncope.   Psychiatric/Behavioral:  Negative for agitation, behavioral problems and confusion.    All other systems reviewed " and are negative.  Objective:     Vital Signs (Most Recent):  Temp: 98.2 °F (36.8 °C) (01/12/23 0810)  Pulse: 88 (01/12/23 0920)  Resp: 20 (01/12/23 0920)  BP: 134/68 (01/12/23 0810)  SpO2: 97 % (01/12/23 0920) Vital Signs (24h Range):  Temp:  [97.6 °F (36.4 °C)-102.8 °F (39.3 °C)] 98.2 °F (36.8 °C)  Pulse:  [] 88  Resp:  [16-22] 20  SpO2:  [96 %-98 %] 97 %  BP: ()/(51-72) 134/68     Weight: 83 kg (182 lb 15.7 oz)  Body mass index is 27.02 kg/m².    Physical Exam  Vitals reviewed.   Constitutional:       General: He is not in acute distress.     Appearance: Normal appearance. He is not ill-appearing.   HENT:      Head: Normocephalic and atraumatic.   Eyes:      General: No scleral icterus.     Extraocular Movements: Extraocular movements intact.      Conjunctiva/sclera: Conjunctivae normal.      Pupils: Pupils are equal, round, and reactive to light.      Comments: Seems to have some exopthalmus    Cardiovascular:      Rate and Rhythm: Normal rate and regular rhythm.      Heart sounds: No murmur heard.    No friction rub. No gallop.   Pulmonary:      Effort: Pulmonary effort is normal. No respiratory distress.      Breath sounds: Normal breath sounds. No wheezing or rales.   Abdominal:      General: Abdomen is flat. Bowel sounds are normal. There is no distension.      Palpations: Abdomen is soft.      Tenderness: There is no abdominal tenderness. There is no guarding.   Genitourinary:     Comments: + Bai  Musculoskeletal:         General: No swelling.   Skin:     General: Skin is warm and dry.      Coloration: Skin is not jaundiced.      Findings: No rash.   Neurological:      General: No focal deficit present.      Mental Status: He is alert and oriented to person, place, and time.      Sensory: No sensory deficit.      Motor: No weakness.   Psychiatric:         Mood and Affect: Mood normal.         Behavior: Behavior normal.         CRANIAL NERVES     CN III, IV, VI   Pupils are equal, round, and  reactive to light.     Significant Labs: All pertinent labs within the past 24 hours have been reviewed.  Recent Lab Results  (Last 5 results in the past 24 hours)        01/12/23  0650   01/12/23  0508   01/12/23  0025   01/11/23  2129   01/11/23  1658        Anion Gap 14               Appearance, UA         Clear       Bacteria, UA         Loaded       Baso # 0.02               Basophil % 0.1               Bilirubin (UA)         Moderate       BUN 64               BUN/CREAT RATIO 16               Calcium 8.8               Chloride 101               CO2 26               Color, UA         Yellow       Creatinine 3.91               Differential Type Manual               eGFR 15               Eos # 0.02               Eosinophil % 0.1               Estimated Avg Glucose       120         Glucose 107               Glucose, UA         100       Hematocrit 28.5               Hemoglobin 9.8               Hemoglobin A1C External       6.2  Comment:   Normal:               <5.7%  Pre-Diabetic:       5.7% to 6.4%  Diabetic:             >6.4%  Diabetic Goal:     <7%         Ketones, UA         15       Lactate, Boris       1.8         Leukocytes, UA         Negative       Lymph # 1.46               Lymph % 6.2               MCH 31.7               MCHC 34.4               MCV 92.2               Mono # 1.74               Mono % 7.4               MPV 10.5               Neutrophils, Abs 20.25               Neutrophils Relative 86.2               NITRITE UA         Negative       Occult Blood UA         Moderate       pH, UA         5.0       Platelets 136               POC Glucose   128   156           Potassium 4.6               Protein, UA         >=300       RBC 3.09               RBC, UA         3-5       RDW 12.0               Sodium 136               Specific Gravity, UA         >=1.030       Squam Epithel, UA         Few       UROBILINOGEN UA         4.0       WBC, UA         5-10       WBC 23.49                                       Significant Imaging: I have reviewed all pertinent imaging results/findings within the past 24 hours.    Assessment/Plan:     Acute on chronic renal failure  Gentle fluids.  Monitor labs.       Urinary retention due to benign prostatic hyperplasia    Continue to monitor with mederos.     Acute cystitis without hematuria  ABX and monitor.        Benign prostatic hyperplasia without lower urinary tract symptoms    Continue BPH meds, has mederos now, will need to make sure he can urinate without or DC home with mederos.     Hyperthyroidism    Has exopthalmus.  Will recheck TSH    Decreased appetite  Likely renal related.  Follow.       CHF (congestive heart failure)  Patient is identified as having unknown type heart failure that is Chronic. CHF is currently controlled. Latest ECHO performed and demonstrates- No results found for this or any previous visit.  . Continue Beta Blocker and monitor clinical status closely. Monitor on telemetry. Patient is off CHF pathway.  Monitor strict Is&Os and daily weights.  Place on fluid restriction of No. Continue to stress to patient importance of self efficacy and  on diet for CHF. Last BNP reviewed- and noted below No results for input(s): BNP, BNPTRIAGEBLO in the last 168 hours..      CKD (chronic kidney disease)  Worse from baseline.  Gentle fluids and monitor closely.      Essential hypertension  Follow and adjust meds as tolerated.       Diabetes  Patient's FSGs are controlled on current medication regimen.  Last A1c reviewed-   Lab Results   Component Value Date    HGBA1C 6.2 01/11/2023     Most recent fingerstick glucose reviewed- No results for input(s): POCTGLUCOSE in the last 24 hours.  Current correctional scale  Low  Maintain anti-hyperglycemic dose as follows-   Antihyperglycemics (From admission, onward)    Start     Stop Route Frequency Ordered    01/11/23 1848  insulin aspart U-100 injection 0-5 Units         -- SubQ Every 6 hours PRN 01/11/23 1846         Hold Oral hypoglycemics while patient is in the hospital.    VTE Risk Mitigation (From admission, onward)         Ordered     enoxaparin injection 30 mg  Daily         01/11/23 1846     IP VTE HIGH RISK PATIENT  Once         01/11/23 1846                   Doron Godwin DO  Department of Hospital Medicine   [facility]

## 2023-01-12 NOTE — PLAN OF CARE
Problem: Adult Inpatient Plan of Care  Goal: Optimal Comfort and Wellbeing  Outcome: Ongoing, Progressing     Problem: Fall Injury Risk  Goal: Absence of Fall and Fall-Related Injury  Outcome: Ongoing, Progressing     Problem: UTI (Urinary Tract Infection)  Goal: Improved Infection Symptoms  Outcome: Ongoing, Progressing

## 2023-01-12 NOTE — SUBJECTIVE & OBJECTIVE
"Past Medical History:   Diagnosis Date    CHF (congestive heart failure)     Diabetes     Hypertension        History reviewed. No pertinent surgical history.    Review of patient's allergies indicates:  No Known Allergies    No current facility-administered medications on file prior to encounter.     Current Outpatient Medications on File Prior to Encounter   Medication Sig    allopurinoL (ZYLOPRIM) 100 MG tablet Take 1 tablet (100 mg total) by mouth once daily.    amlodipine-benazepril 5-20 mg (LOTREL) 5-20 mg per capsule Take 1 capsule by mouth once daily.    brimonidine 0.2% (ALPHAGAN) 0.2 % Drop 1 drop every 8 (eight) hours.    cetirizine (ZYRTEC) 10 MG tablet TAKE ONE TABLET BY MOUTH EVERY DAY    cyproheptadine (PERIACTIN) 4 mg tablet 1/2 tab po twice daily    docosahexaenoic acid-epa 120-180 mg Cap Take by mouth once daily.    dorzolamide-timolol 2-0.5% (COSOPT) 22.3-6.8 mg/mL ophthalmic solution 1 drop 3 (three) times daily.    gabapentin (NEURONTIN) 400 MG capsule Take 1 capsule (400 mg total) by mouth 2 (two) times daily.    insulin glargine,hum.rec.anlog (BASAGLAR KWIKPEN U-100 INSULIN SUBQ) Inject 20 Units into the skin once daily.    insulin glargine,hum.rec.anlog (BASAGLAR KWIKPEN U-100 INSULIN SUBQ) Inject 35 Units into the skin every evening.    latanoprost 0.005 % ophthalmic solution Place 1 drop into both eyes nightly.    lovastatin (MEVACOR) 20 MG tablet Take 1 tablet (20 mg total) by mouth nightly.    metFORMIN (GLUCOPHAGE) 1000 MG tablet Take 1 tablet (1,000 mg total) by mouth 2 (two) times daily.    montelukast (SINGULAIR) 10 mg tablet Take 1 tablet (10 mg total) by mouth nightly.    tamsulosin (FLOMAX) 0.4 mg Cap Take 1 capsule (0.4 mg total) by mouth once daily.    TRUE METRIX GLUCOSE TEST STRIP Strp once daily. Test Blood Sugar    ULTICARE PEN NEEDLE 32 gauge x 5/32" Ndle USE WITH BASAGLAR INSULIN TWICE DAILY     Family History    None       Tobacco Use    Smoking status: Some Days    " Smokeless tobacco: Never   Substance and Sexual Activity    Alcohol use: Never    Drug use: Never    Sexual activity: Not on file     Review of Systems   Constitutional:  Negative for appetite change, chills and fever.   Respiratory:  Negative for cough, shortness of breath and wheezing.    Cardiovascular:  Negative for chest pain, palpitations and leg swelling.   Gastrointestinal:  Negative for abdominal distention, diarrhea, nausea and vomiting.   Genitourinary:  Positive for decreased urine volume, difficulty urinating, dysuria and urgency.   Skin:  Negative for rash.   Neurological:  Negative for dizziness, seizures and syncope.   Psychiatric/Behavioral:  Negative for agitation, behavioral problems and confusion.    All other systems reviewed and are negative.  Objective:     Vital Signs (Most Recent):  Temp: 98.2 °F (36.8 °C) (01/12/23 0810)  Pulse: 88 (01/12/23 0920)  Resp: 20 (01/12/23 0920)  BP: 134/68 (01/12/23 0810)  SpO2: 97 % (01/12/23 0920) Vital Signs (24h Range):  Temp:  [97.6 °F (36.4 °C)-102.8 °F (39.3 °C)] 98.2 °F (36.8 °C)  Pulse:  [] 88  Resp:  [16-22] 20  SpO2:  [96 %-98 %] 97 %  BP: ()/(51-72) 134/68     Weight: 83 kg (182 lb 15.7 oz)  Body mass index is 27.02 kg/m².    Physical Exam  Vitals reviewed.   Constitutional:       General: He is not in acute distress.     Appearance: Normal appearance. He is not ill-appearing.   HENT:      Head: Normocephalic and atraumatic.   Eyes:      General: No scleral icterus.     Extraocular Movements: Extraocular movements intact.      Conjunctiva/sclera: Conjunctivae normal.      Pupils: Pupils are equal, round, and reactive to light.      Comments: Seems to have some exopthalmus    Cardiovascular:      Rate and Rhythm: Normal rate and regular rhythm.      Heart sounds: No murmur heard.    No friction rub. No gallop.   Pulmonary:      Effort: Pulmonary effort is normal. No respiratory distress.      Breath sounds: Normal breath sounds. No  wheezing or rales.   Abdominal:      General: Abdomen is flat. Bowel sounds are normal. There is no distension.      Palpations: Abdomen is soft.      Tenderness: There is no abdominal tenderness. There is no guarding.   Genitourinary:     Comments: + Bai  Musculoskeletal:         General: No swelling.   Skin:     General: Skin is warm and dry.      Coloration: Skin is not jaundiced.      Findings: No rash.   Neurological:      General: No focal deficit present.      Mental Status: He is alert and oriented to person, place, and time.      Sensory: No sensory deficit.      Motor: No weakness.   Psychiatric:         Mood and Affect: Mood normal.         Behavior: Behavior normal.         CRANIAL NERVES     CN III, IV, VI   Pupils are equal, round, and reactive to light.     Significant Labs: All pertinent labs within the past 24 hours have been reviewed.  Recent Lab Results  (Last 5 results in the past 24 hours)        01/12/23  0650   01/12/23  0508   01/12/23  0025   01/11/23  2129   01/11/23  1658        Anion Gap 14               Appearance, UA         Clear       Bacteria, UA         Loaded       Baso # 0.02               Basophil % 0.1               Bilirubin (UA)         Moderate       BUN 64               BUN/CREAT RATIO 16               Calcium 8.8               Chloride 101               CO2 26               Color, UA         Yellow       Creatinine 3.91               Differential Type Manual               eGFR 15               Eos # 0.02               Eosinophil % 0.1               Estimated Avg Glucose       120         Glucose 107               Glucose, UA         100       Hematocrit 28.5               Hemoglobin 9.8               Hemoglobin A1C External       6.2  Comment:   Normal:               <5.7%  Pre-Diabetic:       5.7% to 6.4%  Diabetic:             >6.4%  Diabetic Goal:     <7%         Ketones, UA         15       Lactate, Boris       1.8         Leukocytes, UA         Negative       Lymph #  1.46               Lymph % 6.2               MCH 31.7               MCHC 34.4               MCV 92.2               Mono # 1.74               Mono % 7.4               MPV 10.5               Neutrophils, Abs 20.25               Neutrophils Relative 86.2               NITRITE UA         Negative       Occult Blood UA         Moderate       pH, UA         5.0       Platelets 136               POC Glucose   128   156           Potassium 4.6               Protein, UA         >=300       RBC 3.09               RBC, UA         3-5       RDW 12.0               Sodium 136               Specific Gravity, UA         >=1.030       Squam Epithel, UA         Few       UROBILINOGEN UA         4.0       WBC, UA         5-10       WBC 23.49                                      Significant Imaging: I have reviewed all pertinent imaging results/findings within the past 24 hours.

## 2023-01-12 NOTE — NURSING
Nurses Note -- 4 Eyes      1/11/2023   9:31 PM      Skin assessed during: Admit      [x] No Pressure Injuries Present    []Prevention Measures Documented      [] Yes- Altered Skin Integrity Present or Discovered   [] LDA Added if Not in Epic (Describe Wound)   [] New Altered Skin Integrity was Present on Admit and Documented in LDA   [] Wound Image Taken    Wound Care Consulted? No    Attending Nurse:  Miguelina Bai RN

## 2023-01-13 LAB
AMMONIA PLAS-SCNC: <10 ΜMOL/L (ref 11–32)
ANION GAP SERPL CALCULATED.3IONS-SCNC: 14 MMOL/L (ref 7–16)
BASOPHILS # BLD AUTO: 0.01 K/UL (ref 0–0.2)
BASOPHILS # BLD AUTO: 0.02 K/UL (ref 0–0.2)
BASOPHILS NFR BLD AUTO: 0.1 % (ref 0–1)
BASOPHILS NFR BLD AUTO: 0.1 % (ref 0–1)
BUN SERPL-MCNC: 58 MG/DL (ref 7–18)
BUN/CREAT SERPL: 20 (ref 6–20)
CALCIUM SERPL-MCNC: 8.5 MG/DL (ref 8.5–10.1)
CHLORIDE SERPL-SCNC: 101 MMOL/L (ref 98–107)
CO2 SERPL-SCNC: 27 MMOL/L (ref 21–32)
CREAT SERPL-MCNC: 2.91 MG/DL (ref 0.7–1.3)
DIFFERENTIAL METHOD BLD: ABNORMAL
DIFFERENTIAL METHOD BLD: ABNORMAL
EGFR (NO RACE VARIABLE) (RUSH/TITUS): 22 ML/MIN/1.73M²
EOSINOPHIL # BLD AUTO: 0.03 K/UL (ref 0–0.5)
EOSINOPHIL # BLD AUTO: 0.05 K/UL (ref 0–0.5)
EOSINOPHIL NFR BLD AUTO: 0.3 % (ref 1–4)
EOSINOPHIL NFR BLD AUTO: 0.3 % (ref 1–4)
ERYTHROCYTE [DISTWIDTH] IN BLOOD BY AUTOMATED COUNT: 11.7 % (ref 11.5–14.5)
ERYTHROCYTE [DISTWIDTH] IN BLOOD BY AUTOMATED COUNT: 11.8 % (ref 11.5–14.5)
GLUCOSE SERPL-MCNC: 122 MG/DL (ref 74–106)
GLUCOSE SERPL-MCNC: 142 MG/DL (ref 70–105)
GLUCOSE SERPL-MCNC: 164 MG/DL (ref 70–105)
GLUCOSE SERPL-MCNC: 164 MG/DL (ref 70–105)
GLUCOSE SERPL-MCNC: 171 MG/DL (ref 70–105)
GLUCOSE SERPL-MCNC: 217 MG/DL (ref 70–105)
HCT VFR BLD AUTO: 24 % (ref 40–54)
HCT VFR BLD AUTO: 27.2 % (ref 40–54)
HGB BLD-MCNC: 8.3 G/DL (ref 13.5–18)
HGB BLD-MCNC: 9.3 G/DL (ref 13.5–18)
LACTATE SERPL-SCNC: 1 MMOL/L (ref 0.4–2)
LYMPHOCYTES # BLD AUTO: 0.79 K/UL (ref 1–4.8)
LYMPHOCYTES # BLD AUTO: 0.82 K/UL (ref 1–4.8)
LYMPHOCYTES NFR BLD AUTO: 5.1 % (ref 27–41)
LYMPHOCYTES NFR BLD AUTO: 8.6 % (ref 27–41)
LYMPHOCYTES NFR BLD MANUAL: 9 % (ref 27–41)
MCH RBC QN AUTO: 31.4 PG (ref 27–31)
MCH RBC QN AUTO: 31.4 PG (ref 27–31)
MCHC RBC AUTO-ENTMCNC: 34.2 G/DL (ref 32–36)
MCHC RBC AUTO-ENTMCNC: 34.6 G/DL (ref 32–36)
MCV RBC AUTO: 90.9 FL (ref 80–96)
MCV RBC AUTO: 91.9 FL (ref 80–96)
MONOCYTES # BLD AUTO: 0.75 K/UL (ref 0–0.8)
MONOCYTES # BLD AUTO: 1.32 K/UL (ref 0–0.8)
MONOCYTES NFR BLD AUTO: 8.2 % (ref 2–6)
MONOCYTES NFR BLD AUTO: 8.2 % (ref 2–6)
MONOCYTES NFR BLD MANUAL: 9 % (ref 2–6)
MPC BLD CALC-MCNC: 10.3 FL (ref 9.4–12.4)
MPC BLD CALC-MCNC: 11.5 FL (ref 9.4–12.4)
NEUTROPHILS # BLD AUTO: 13.97 K/UL (ref 1.8–7.7)
NEUTROPHILS # BLD AUTO: 7.61 K/UL (ref 1.8–7.7)
NEUTROPHILS NFR BLD AUTO: 82.8 % (ref 53–65)
NEUTROPHILS NFR BLD AUTO: 86.3 % (ref 53–65)
NEUTS SEG NFR BLD MANUAL: 82 % (ref 50–62)
PLATELET # BLD AUTO: 135 K/UL (ref 150–400)
PLATELET # BLD AUTO: 135 K/UL (ref 150–400)
PLATELET MORPHOLOGY: NORMAL
POTASSIUM SERPL-SCNC: 4.5 MMOL/L (ref 3.5–5.1)
RBC # BLD AUTO: 2.64 M/UL (ref 4.6–6.2)
RBC # BLD AUTO: 2.96 M/UL (ref 4.6–6.2)
SODIUM SERPL-SCNC: 137 MMOL/L (ref 136–145)
WBC # BLD AUTO: 16.18 K/UL (ref 4.5–11)
WBC # BLD AUTO: 9.19 K/UL (ref 4.5–11)

## 2023-01-13 PROCEDURE — 27000958

## 2023-01-13 PROCEDURE — 11000001 HC ACUTE MED/SURG PRIVATE ROOM

## 2023-01-13 PROCEDURE — 82962 GLUCOSE BLOOD TEST: CPT

## 2023-01-13 PROCEDURE — 83605 ASSAY OF LACTIC ACID: CPT | Performed by: NURSE PRACTITIONER

## 2023-01-13 PROCEDURE — 85025 COMPLETE CBC W/AUTO DIFF WBC: CPT | Performed by: NURSE PRACTITIONER

## 2023-01-13 PROCEDURE — 25000003 PHARM REV CODE 250: Performed by: NURSE PRACTITIONER

## 2023-01-13 PROCEDURE — 80048 BASIC METABOLIC PNL TOTAL CA: CPT | Performed by: NURSE PRACTITIONER

## 2023-01-13 PROCEDURE — G0378 HOSPITAL OBSERVATION PER HR: HCPCS

## 2023-01-13 PROCEDURE — 82140 ASSAY OF AMMONIA: CPT | Performed by: NURSE PRACTITIONER

## 2023-01-13 PROCEDURE — 96372 THER/PROPH/DIAG INJ SC/IM: CPT

## 2023-01-13 PROCEDURE — 99232 PR SUBSEQUENT HOSPITAL CARE,LEVL II: ICD-10-PCS | Mod: ,,, | Performed by: HOSPITALIST

## 2023-01-13 PROCEDURE — 96361 HYDRATE IV INFUSION ADD-ON: CPT | Mod: 59

## 2023-01-13 PROCEDURE — 25000003 PHARM REV CODE 250: Performed by: HOSPITALIST

## 2023-01-13 PROCEDURE — 96366 THER/PROPH/DIAG IV INF ADDON: CPT

## 2023-01-13 PROCEDURE — 99232 SBSQ HOSP IP/OBS MODERATE 35: CPT | Mod: ,,, | Performed by: HOSPITALIST

## 2023-01-13 PROCEDURE — 36415 COLL VENOUS BLD VENIPUNCTURE: CPT | Performed by: NURSE PRACTITIONER

## 2023-01-13 PROCEDURE — 63600175 PHARM REV CODE 636 W HCPCS: Performed by: NURSE PRACTITIONER

## 2023-01-13 PROCEDURE — 94761 N-INVAS EAR/PLS OXIMETRY MLT: CPT

## 2023-01-13 RX ORDER — SODIUM CHLORIDE 9 MG/ML
1000 INJECTION, SOLUTION INTRAVENOUS ONCE
Status: COMPLETED | OUTPATIENT
Start: 2023-01-13 | End: 2023-01-13

## 2023-01-13 RX ADMIN — DORZOLAMIDE HYDROCHLORIDE AND TIMOLOL MALEATE 1 DROP: 20; 5 SOLUTION/ DROPS OPHTHALMIC at 02:01

## 2023-01-13 RX ADMIN — SODIUM CHLORIDE 1000 ML: 9 INJECTION, SOLUTION INTRAVENOUS at 11:01

## 2023-01-13 RX ADMIN — ENOXAPARIN SODIUM 30 MG: 100 INJECTION SUBCUTANEOUS at 05:01

## 2023-01-13 RX ADMIN — DORZOLAMIDE HYDROCHLORIDE AND TIMOLOL MALEATE 1 DROP: 20; 5 SOLUTION/ DROPS OPHTHALMIC at 10:01

## 2023-01-13 RX ADMIN — INSULIN ASPART 2 UNITS: 100 INJECTION, SOLUTION INTRAVENOUS; SUBCUTANEOUS at 12:01

## 2023-01-13 RX ADMIN — DORZOLAMIDE HYDROCHLORIDE AND TIMOLOL MALEATE 1 DROP: 20; 5 SOLUTION/ DROPS OPHTHALMIC at 09:01

## 2023-01-13 RX ADMIN — SODIUM CHLORIDE 500 ML: 9 INJECTION, SOLUTION INTRAVENOUS at 07:01

## 2023-01-13 RX ADMIN — ACETAMINOPHEN 650 MG: 325 TABLET ORAL at 10:01

## 2023-01-13 RX ADMIN — GABAPENTIN 100 MG: 100 CAPSULE ORAL at 09:01

## 2023-01-13 RX ADMIN — GABAPENTIN 100 MG: 100 CAPSULE ORAL at 10:01

## 2023-01-13 RX ADMIN — ACETAMINOPHEN 650 MG: 325 TABLET ORAL at 04:01

## 2023-01-13 RX ADMIN — ACETAMINOPHEN 650 MG: 325 TABLET ORAL at 06:01

## 2023-01-13 RX ADMIN — TAMSULOSIN HYDROCHLORIDE 0.8 MG: 0.4 CAPSULE ORAL at 09:01

## 2023-01-13 RX ADMIN — CEFTRIAXONE SODIUM 1 G: 1 INJECTION, POWDER, FOR SOLUTION INTRAMUSCULAR; INTRAVENOUS at 09:01

## 2023-01-13 RX ADMIN — BRIMONIDINE TARTRATE 1 DROP: 2 SOLUTION/ DROPS OPHTHALMIC at 06:01

## 2023-01-13 RX ADMIN — BRIMONIDINE TARTRATE 1 DROP: 2 SOLUTION/ DROPS OPHTHALMIC at 10:01

## 2023-01-13 RX ADMIN — BRIMONIDINE TARTRATE 1 DROP: 2 SOLUTION/ DROPS OPHTHALMIC at 02:01

## 2023-01-13 NOTE — HOSPITAL COURSE
1/13 He seems better, labs slowly improving.  Still has mederos.  Will try to get it out today.  Continue IV ABX and gentle fluids.  Still may need additional time for treatment. Increased Flomax.     1/13 Called to patient's room by nursing staff due to acute AMS. Unable to perform neuro exam due to lack of patient cooperation. CT of head ordered and performed with no acute pathology. Nurse noted patient hypotensive. Fluid bolus ordered. Patient BP improved. AMS improved. Will continue to monitor.    1/15 Mr Brown is awake, oriented. He reports he feels a little better today.  He is still receiving IV rocephin and is slowly improving.  Will continue IV abx and gentle hydration    1/16 called by RAMSEY Mederos RN that Mr Brown only voiding 10 ml at a time and reporting fullness - ordered reinsertion of mederos for urinary retention    1/16 Had to get mederos placed back, got 1200cc of urine.  He feels better. BP increasing will add back home Norvasc.  More alert today. Urine culture K. Pneumonia.  S to Rocephin. Will change to PO ABX today.      1/17 Seems to be doing well, still has Mederos catheter.  Monitoring labs.  PT eval done.  Awaiting SWB approval for therapy and mederos removal if possible.      1/18 DC to [facility] today, continue with therapy

## 2023-01-13 NOTE — NURSING
Nurses Note -- 4 Eyes      1/12/2023   10:00 PM      Skin assessed during: Q Shift Change      [x] No Pressure Injuries Present    []Prevention Measures Documented      [] Yes- Altered Skin Integrity Present or Discovered   [] LDA Added if Not in Epic (Describe Wound)   [] New Altered Skin Integrity was Present on Admit and Documented in LDA   [] Wound Image Taken    Wound Care Consulted? No    Attending Nurse:  Miguelina Bai RN     Second RN/Staff Member: Suyapa Saha LPN

## 2023-01-13 NOTE — NURSING
Patient received confused, unable to state name or place, notified ER NP, NP made rounds, alert but confused , blood sugar 171. Skin w/d to touch. Family at bedside.

## 2023-01-13 NOTE — NURSING
"Order for ct without contrast , temp 102.5 ax @ 1622, tylenol 650mg po given for elevated temp, awake , alert, can state name as "Jake " at present, family at bedside.   "

## 2023-01-13 NOTE — SUBJECTIVE & OBJECTIVE
Interval History: seems better, still has mederos.  NO N/V, still low grade fever.     Review of Systems   Respiratory:  Negative for shortness of breath.    Cardiovascular:  Negative for chest pain.   Gastrointestinal:  Negative for abdominal pain, nausea and vomiting.   Genitourinary:  Positive for difficulty urinating.   Neurological:  Positive for weakness.   Psychiatric/Behavioral:  Negative for agitation and confusion.    All other systems reviewed and are negative.  Objective:     Vital Signs (Most Recent):  Temp: 100.3 °F (37.9 °C) (01/13/23 0818)  Pulse: 84 (01/13/23 0818)  Resp: 20 (01/13/23 0818)  BP: (!) 104/57 (01/13/23 0818)  SpO2: 95 % (01/13/23 0900)   Vital Signs (24h Range):  Temp:  [98.5 °F (36.9 °C)-101.6 °F (38.7 °C)] 100.3 °F (37.9 °C)  Pulse:  [84-96] 84  Resp:  [20-22] 20  SpO2:  [95 %-98 %] 95 %  BP: (104-130)/(50-63) 104/57     Weight: 79.9 kg (176 lb 2.4 oz)  Body mass index is 26.01 kg/m².    Intake/Output Summary (Last 24 hours) at 1/13/2023 0954  Last data filed at 1/13/2023 0819  Gross per 24 hour   Intake 1240 ml   Output 1750 ml   Net -510 ml      Physical Exam  Vitals reviewed.   Constitutional:       General: He is not in acute distress.     Appearance: Normal appearance. He is not ill-appearing.   HENT:      Head: Normocephalic and atraumatic.   Eyes:      General: No scleral icterus.     Extraocular Movements: Extraocular movements intact.      Conjunctiva/sclera: Conjunctivae normal.      Pupils: Pupils are equal, round, and reactive to light.      Comments: Seems to have some exopthalmus    Cardiovascular:      Rate and Rhythm: Normal rate and regular rhythm.      Heart sounds: No murmur heard.    No friction rub. No gallop.   Pulmonary:      Effort: Pulmonary effort is normal. No respiratory distress.      Breath sounds: Normal breath sounds. No wheezing or rales.   Abdominal:      General: Abdomen is flat. Bowel sounds are normal. There is no distension.      Palpations:  Abdomen is soft.      Tenderness: There is no abdominal tenderness. There is no guarding.   Genitourinary:     Comments: + Bai  Musculoskeletal:         General: No swelling.      Right lower leg: No edema.      Left lower leg: No edema.   Skin:     General: Skin is warm and dry.      Coloration: Skin is not jaundiced.      Findings: No rash.   Neurological:      General: No focal deficit present.      Mental Status: He is alert and oriented to person, place, and time.      Sensory: No sensory deficit.      Motor: Weakness present.   Psychiatric:         Mood and Affect: Mood normal.         Behavior: Behavior normal.       Significant Labs: All pertinent labs within the past 24 hours have been reviewed.  Recent Lab Results  (Last 5 results in the past 24 hours)        01/13/23  0629   01/13/23  0616   01/13/23  0045   01/12/23  1753   01/12/23  1236        Anion Gap   14             Baso #   0.02             Basophil %   0.1             BUN   58             BUN/CREAT RATIO   20             Calcium   8.5             Chloride   101             CO2   27             Creatinine   2.91             Differential Type   Manual             eGFR   22             Eos #   0.05             Eosinophil %   0.3             Glucose   122             Hematocrit   27.2             Hemoglobin   9.3             Lymph #   0.82             Lymph %   5.1                9             MCH   31.4             MCHC   34.2             MCV   91.9             Mono #   1.32             Mono %   8.2                9             MPV   11.5             Neutrophils, Abs   13.97             Neutrophils Relative   86.3             PLATELET MORPHOLOGY   Normal             Platelets   135             POC Glucose 142     164   102   176       Potassium   4.5             RBC   2.96             RDW   11.7             Segmented Neutrophils, Man %   82             Sodium   137             WBC   16.18                                    Significant Imaging: I  have reviewed all pertinent imaging results/findings within the past 24 hours.

## 2023-01-13 NOTE — NURSING
Patient's wet brief changed, scant amount of blood in urine, mederos removed this afternoon, notified ER NP , no new orders at present. Patient alert , skin w/d to touch. Call light within reach. No c/o voiced at present

## 2023-01-13 NOTE — PLAN OF CARE
Problem: Infection  Goal: Absence of Infection Signs and Symptoms  Outcome: Ongoing, Progressing     Problem: Adult Inpatient Plan of Care  Goal: Plan of Care Review  Outcome: Ongoing, Progressing  Goal: Patient-Specific Goal (Individualized)  Outcome: Ongoing, Progressing  Goal: Absence of Hospital-Acquired Illness or Injury  Outcome: Ongoing, Progressing  Goal: Optimal Comfort and Wellbeing  Outcome: Ongoing, Progressing  Goal: Readiness for Transition of Care  Outcome: Ongoing, Progressing     Problem: Diabetes Comorbidity  Goal: Blood Glucose Level Within Targeted Range  Outcome: Ongoing, Progressing     Problem: Skin Injury Risk Increased  Goal: Skin Health and Integrity  Outcome: Ongoing, Progressing     Problem: Fall Injury Risk  Goal: Absence of Fall and Fall-Related Injury  Outcome: Ongoing, Progressing     Problem: UTI (Urinary Tract Infection)  Goal: Improved Infection Symptoms  Outcome: Ongoing, Progressing     Problem: Fluid and Electrolyte Imbalance (Acute Kidney Injury/Impairment)  Goal: Fluid and Electrolyte Balance  Outcome: Ongoing, Progressing     Problem: Oral Intake Inadequate (Acute Kidney Injury/Impairment)  Goal: Optimal Nutrition Intake  Outcome: Ongoing, Progressing     Problem: Renal Function Impairment (Acute Kidney Injury/Impairment)  Goal: Effective Renal Function  Outcome: Ongoing, Progressing

## 2023-01-13 NOTE — PROGRESS NOTES
[facility] Medicine  Progress Note    Patient Name: Jake Brown  MRN: [id number]  Patient Class: IP- Inpatient   Admission Date: 1/11/2023  Length of Stay: 0 days  Attending Physician: Doron Godwin DO  Primary Care Provider: Osvaldo Hodges MD        Subjective:     Principal Problem:Acute on chronic renal failure        HPI:  Mr Roland is a 72 y/o male that presented to [facility] ED with fever, N/V/D x2 days. He was found to have Urosepsis and LIZANDRO. WBC 27.9. Lactate 2.7. Creatinine 4.9, BUN 69. This is up from recent labs on 1/9 with creatinine 3.10. CT abdomen pelvis showed layering densities in gallbladder likely small stones. Amylase/Lipase WNL. Fever up to 102.8, improved to 101 with Tylenol. HR up to 122, improved with IV fluids. He had 2 episodes of hypotension while in ED but resolved with IV fluids. He exhibited no abd tenderness and Greenberg's sign negative. given Zofran and nausea subsided. No vomiting while in ED.     Code Status: FULL CODE      Overview/Hospital Course:  1/13 He seems better, labs slowly improving.  Still has mederos.  Will try to get it out today.  Continue IV ABX and gentle fluids.  Still may need additional time for treatment. Increased Flomax.       Interval History: seems better, still has mederos.  NO N/V, still low grade fever.     Review of Systems   Respiratory:  Negative for shortness of breath.    Cardiovascular:  Negative for chest pain.   Gastrointestinal:  Negative for abdominal pain, nausea and vomiting.   Genitourinary:  Positive for difficulty urinating.   Neurological:  Positive for weakness.   Psychiatric/Behavioral:  Negative for agitation and confusion.    All other systems reviewed and are negative.  Objective:     Vital Signs (Most Recent):  Temp: 100.3 °F (37.9 °C) (01/13/23 0818)  Pulse: 84 (01/13/23 0818)  Resp: 20 (01/13/23 0818)  BP: (!) 104/57 (01/13/23 0818)  SpO2: 95 % (01/13/23 0900)   Vital Signs (24h Range):  Temp:   [98.5 °F (36.9 °C)-101.6 °F (38.7 °C)] 100.3 °F (37.9 °C)  Pulse:  [84-96] 84  Resp:  [20-22] 20  SpO2:  [95 %-98 %] 95 %  BP: (104-130)/(50-63) 104/57     Weight: 79.9 kg (176 lb 2.4 oz)  Body mass index is 26.01 kg/m².    Intake/Output Summary (Last 24 hours) at 1/13/2023 0954  Last data filed at 1/13/2023 0819  Gross per 24 hour   Intake 1240 ml   Output 1750 ml   Net -510 ml      Physical Exam  Vitals reviewed.   Constitutional:       General: He is not in acute distress.     Appearance: Normal appearance. He is not ill-appearing.   HENT:      Head: Normocephalic and atraumatic.   Eyes:      General: No scleral icterus.     Extraocular Movements: Extraocular movements intact.      Conjunctiva/sclera: Conjunctivae normal.      Pupils: Pupils are equal, round, and reactive to light.      Comments: Seems to have some exopthalmus    Cardiovascular:      Rate and Rhythm: Normal rate and regular rhythm.      Heart sounds: No murmur heard.    No friction rub. No gallop.   Pulmonary:      Effort: Pulmonary effort is normal. No respiratory distress.      Breath sounds: Normal breath sounds. No wheezing or rales.   Abdominal:      General: Abdomen is flat. Bowel sounds are normal. There is no distension.      Palpations: Abdomen is soft.      Tenderness: There is no abdominal tenderness. There is no guarding.   Genitourinary:     Comments: + Bai  Musculoskeletal:         General: No swelling.      Right lower leg: No edema.      Left lower leg: No edema.   Skin:     General: Skin is warm and dry.      Coloration: Skin is not jaundiced.      Findings: No rash.   Neurological:      General: No focal deficit present.      Mental Status: He is alert and oriented to person, place, and time.      Sensory: No sensory deficit.      Motor: Weakness present.   Psychiatric:         Mood and Affect: Mood normal.         Behavior: Behavior normal.       Significant Labs: All pertinent labs within the past 24 hours have been  reviewed.  Recent Lab Results  (Last 5 results in the past 24 hours)        01/13/23  0629   01/13/23  0616   01/13/23  0045   01/12/23  1753   01/12/23  1236        Anion Gap   14             Baso #   0.02             Basophil %   0.1             BUN   58             BUN/CREAT RATIO   20             Calcium   8.5             Chloride   101             CO2   27             Creatinine   2.91             Differential Type   Manual             eGFR   22             Eos #   0.05             Eosinophil %   0.3             Glucose   122             Hematocrit   27.2             Hemoglobin   9.3             Lymph #   0.82             Lymph %   5.1                9             MCH   31.4             MCHC   34.2             MCV   91.9             Mono #   1.32             Mono %   8.2                9             MPV   11.5             Neutrophils, Abs   13.97             Neutrophils Relative   86.3             PLATELET MORPHOLOGY   Normal             Platelets   135             POC Glucose 142     164   102   176       Potassium   4.5             RBC   2.96             RDW   11.7             Segmented Neutrophils, Man %   82             Sodium   137             WBC   16.18                                    Significant Imaging: I have reviewed all pertinent imaging results/findings within the past 24 hours.      Assessment/Plan:      * Acute on chronic renal failure  Gentle fluids.  Monitor labs.   Improving.     Urinary retention due to benign prostatic hyperplasia    Continue to monitor with mederos.     Acute cystitis without hematuria  ABX and monitor.  Still no culture prelim.  Working on getting mederos out.       Benign prostatic hyperplasia without lower urinary tract symptoms    Continue BPH meds, has mederos now, will need to make sure he can urinate without or DC home with mederos.     Hyperthyroidism    Has exopthalmus.  Will recheck TSH    Decreased appetite  Likely renal related.  Follow.       CHF (congestive heart  failure)  Patient is identified as having unknown type heart failure that is Chronic. CHF is currently controlled. Latest ECHO performed and demonstrates- No results found for this or any previous visit.  . Continue Beta Blocker and monitor clinical status closely. Monitor on telemetry. Patient is off CHF pathway.  Monitor strict Is&Os and daily weights.  Place on fluid restriction of No. Continue to stress to patient importance of self efficacy and  on diet for CHF. Last BNP reviewed- and noted below No results for input(s): BNP, BNPTRIAGEBLO in the last 168 hours..      CKD (chronic kidney disease)  Worse from baseline.  Gentle fluids and monitor closely.      Essential hypertension  Follow and adjust meds as tolerated.       Diabetes  Patient's FSGs are controlled on current medication regimen.  Last A1c reviewed-   Lab Results   Component Value Date    HGBA1C 6.2 01/11/2023     Most recent fingerstick glucose reviewed- No results for input(s): POCTGLUCOSE in the last 24 hours.  Current correctional scale  Low  Maintain anti-hyperglycemic dose as follows-   Antihyperglycemics (From admission, onward)    Start     Stop Route Frequency Ordered    01/11/23 1847  insulin aspart U-100 injection 0-5 Units         -- SubQ Every 6 hours PRN 01/11/23 1846        Hold Oral hypoglycemics while patient is in the hospital.    VTE Risk Mitigation (From admission, onward)         Ordered     enoxaparin injection 30 mg  Daily         01/11/23 1846     IP VTE HIGH RISK PATIENT  Once         01/11/23 1846                Discharge Planning   KARINA:      Code Status: Full Code   Is the patient medically ready for discharge?:     Reason for patient still in hospital (select all that apply): Patient trending condition and Laboratory test                     Doron Godwin DO  Department of Hospital Medicine   [facility]

## 2023-01-14 LAB
ANION GAP SERPL CALCULATED.3IONS-SCNC: 11 MMOL/L (ref 7–16)
BASOPHILS # BLD AUTO: 0.01 K/UL (ref 0–0.2)
BASOPHILS NFR BLD AUTO: 0.2 % (ref 0–1)
BUN SERPL-MCNC: 49 MG/DL (ref 7–18)
BUN/CREAT SERPL: 22 (ref 6–20)
CALCIUM SERPL-MCNC: 8 MG/DL (ref 8.5–10.1)
CHLORIDE SERPL-SCNC: 102 MMOL/L (ref 98–107)
CO2 SERPL-SCNC: 26 MMOL/L (ref 21–32)
CREAT SERPL-MCNC: 2.21 MG/DL (ref 0.7–1.3)
DIFFERENTIAL METHOD BLD: ABNORMAL
EGFR (NO RACE VARIABLE) (RUSH/TITUS): 30 ML/MIN/1.73M²
EOSINOPHIL # BLD AUTO: 0.03 K/UL (ref 0–0.5)
EOSINOPHIL NFR BLD AUTO: 0.5 % (ref 1–4)
ERYTHROCYTE [DISTWIDTH] IN BLOOD BY AUTOMATED COUNT: 11.9 % (ref 11.5–14.5)
GLUCOSE SERPL-MCNC: 141 MG/DL (ref 74–106)
GLUCOSE SERPL-MCNC: 162 MG/DL (ref 70–105)
GLUCOSE SERPL-MCNC: 172 MG/DL (ref 70–105)
GLUCOSE SERPL-MCNC: 200 MG/DL (ref 70–105)
GLUCOSE SERPL-MCNC: 213 MG/DL (ref 70–105)
GLUCOSE SERPL-MCNC: 254 MG/DL (ref 70–105)
HCT VFR BLD AUTO: 27.1 % (ref 40–54)
HGB BLD-MCNC: 9.4 G/DL (ref 13.5–18)
LYMPHOCYTES # BLD AUTO: 0.61 K/UL (ref 1–4.8)
LYMPHOCYTES NFR BLD AUTO: 10.1 % (ref 27–41)
MCH RBC QN AUTO: 31.5 PG (ref 27–31)
MCHC RBC AUTO-ENTMCNC: 34.7 G/DL (ref 32–36)
MCV RBC AUTO: 90.9 FL (ref 80–96)
MONOCYTES # BLD AUTO: 0.93 K/UL (ref 0–0.8)
MONOCYTES NFR BLD AUTO: 15.3 % (ref 2–6)
MPC BLD CALC-MCNC: 10.8 FL (ref 9.4–12.4)
NEUTROPHILS # BLD AUTO: 4.48 K/UL (ref 1.8–7.7)
NEUTROPHILS NFR BLD AUTO: 73.9 % (ref 53–65)
PLATELET # BLD AUTO: 121 K/UL (ref 150–400)
POTASSIUM SERPL-SCNC: 4.1 MMOL/L (ref 3.5–5.1)
RBC # BLD AUTO: 2.98 M/UL (ref 4.6–6.2)
SODIUM SERPL-SCNC: 135 MMOL/L (ref 136–145)
UA COMPLETE W REFLEX CULTURE PNL UR: ABNORMAL
WBC # BLD AUTO: 6.06 K/UL (ref 4.5–11)

## 2023-01-14 PROCEDURE — 25000003 PHARM REV CODE 250: Performed by: NURSE PRACTITIONER

## 2023-01-14 PROCEDURE — 82962 GLUCOSE BLOOD TEST: CPT | Mod: 91

## 2023-01-14 PROCEDURE — 80048 BASIC METABOLIC PNL TOTAL CA: CPT | Performed by: NURSE PRACTITIONER

## 2023-01-14 PROCEDURE — 96372 THER/PROPH/DIAG INJ SC/IM: CPT

## 2023-01-14 PROCEDURE — 25000003 PHARM REV CODE 250

## 2023-01-14 PROCEDURE — 25000003 PHARM REV CODE 250: Performed by: HOSPITALIST

## 2023-01-14 PROCEDURE — 27000958

## 2023-01-14 PROCEDURE — 63600175 PHARM REV CODE 636 W HCPCS: Performed by: NURSE PRACTITIONER

## 2023-01-14 PROCEDURE — 96366 THER/PROPH/DIAG IV INF ADDON: CPT

## 2023-01-14 PROCEDURE — 11000001 HC ACUTE MED/SURG PRIVATE ROOM

## 2023-01-14 PROCEDURE — 96361 HYDRATE IV INFUSION ADD-ON: CPT

## 2023-01-14 PROCEDURE — 85025 COMPLETE CBC W/AUTO DIFF WBC: CPT | Performed by: NURSE PRACTITIONER

## 2023-01-14 RX ORDER — SODIUM CHLORIDE 9 MG/ML
INJECTION, SOLUTION INTRAVENOUS ONCE
Status: COMPLETED | OUTPATIENT
Start: 2023-01-14 | End: 2023-01-15

## 2023-01-14 RX ADMIN — DORZOLAMIDE HYDROCHLORIDE AND TIMOLOL MALEATE 1 DROP: 20; 5 SOLUTION/ DROPS OPHTHALMIC at 08:01

## 2023-01-14 RX ADMIN — SODIUM CHLORIDE: 9 INJECTION, SOLUTION INTRAVENOUS at 12:01

## 2023-01-14 RX ADMIN — CEFTRIAXONE SODIUM 1 G: 1 INJECTION, POWDER, FOR SOLUTION INTRAMUSCULAR; INTRAVENOUS at 09:01

## 2023-01-14 RX ADMIN — DORZOLAMIDE HYDROCHLORIDE AND TIMOLOL MALEATE 1 DROP: 20; 5 SOLUTION/ DROPS OPHTHALMIC at 02:01

## 2023-01-14 RX ADMIN — BRIMONIDINE TARTRATE 1 DROP: 2 SOLUTION/ DROPS OPHTHALMIC at 02:01

## 2023-01-14 RX ADMIN — ENOXAPARIN SODIUM 30 MG: 100 INJECTION SUBCUTANEOUS at 04:01

## 2023-01-14 RX ADMIN — TAMSULOSIN HYDROCHLORIDE 0.8 MG: 0.4 CAPSULE ORAL at 08:01

## 2023-01-14 RX ADMIN — GABAPENTIN 100 MG: 100 CAPSULE ORAL at 08:01

## 2023-01-14 RX ADMIN — ACETAMINOPHEN 650 MG: 325 TABLET ORAL at 11:01

## 2023-01-14 RX ADMIN — INSULIN ASPART 1 UNITS: 100 INJECTION, SOLUTION INTRAVENOUS; SUBCUTANEOUS at 08:01

## 2023-01-14 RX ADMIN — BRIMONIDINE TARTRATE 1 DROP: 2 SOLUTION/ DROPS OPHTHALMIC at 06:01

## 2023-01-14 NOTE — NURSING
Patient is getting a bolus of NS at 500cc in 1 hour then discontinue. Temp at th I s time is 100.1, bp is 109/49 with map of 73. Pulse of 85 and respirations of 22 per minute. Will continue to monitor.

## 2023-01-14 NOTE — PROGRESS NOTES
[facility] Medicine  Progress Note    Patient Name: Jake Brown  MRN: [id number]  Patient Class: IP- Inpatient   Admission Date: 1/11/2023  Length of Stay: 1 days  Attending Physician: Doron Godwin DO  Primary Care Provider: Osvaldo Hodges MD        Subjective:     Principal Problem:Acute on chronic renal failure    Interval History:  Patient receiving IV Rocephin for acute urinary tract infection and normal saline at 50 cc an hour for LIZANDRO.  T-max 99.0° after 12 midnight patient more alert today.    Review of Systems   Constitutional: Negative.    HENT: Negative.     Eyes: Negative.    Respiratory: Negative.     Cardiovascular: Negative.    Gastrointestinal: Negative.    Endocrine: Negative.    Genitourinary: Negative.    Musculoskeletal: Negative.    Skin: Negative.    Allergic/Immunologic: Negative.    Neurological: Negative.    Hematological: Negative.    Psychiatric/Behavioral: Negative.     Objective:     Vital Signs (Most Recent):  Temp: 99 °F (37.2 °C) (01/14/23 1325)  Pulse: 85 (01/14/23 1153)  Resp: 20 (01/14/23 1153)  BP: 125/76 (01/14/23 1153)  SpO2: 99 % (01/14/23 1153) Vital Signs (24h Range):  Temp:  [98 °F (36.7 °C)-102.5 °F (39.2 °C)] 99 °F (37.2 °C)  Pulse:  [75-97] 85  Resp:  [20-22] 20  SpO2:  [95 %-99 %] 99 %  BP: ()/(47-76) 125/76     Weight: 82.6 kg (182 lb 1.6 oz)  Body mass index is 26.89 kg/m².    Intake/Output Summary (Last 24 hours) at 1/14/2023 1326  Last data filed at 1/14/2023 0930  Gross per 24 hour   Intake 1947 ml   Output 100 ml   Net 1847 ml      Physical Exam  Vitals and nursing note reviewed.   Constitutional:       General: He is not in acute distress.     Appearance: Normal appearance. He is normal weight. He is not ill-appearing, toxic-appearing or diaphoretic.   HENT:      Head: Normocephalic and atraumatic.      Right Ear: Tympanic membrane, ear canal and external ear normal. There is no impacted cerumen.      Left  Ear: Tympanic membrane, ear canal and external ear normal. There is no impacted cerumen.      Nose: No congestion or rhinorrhea.      Mouth/Throat:      Mouth: Mucous membranes are moist.      Pharynx: Oropharynx is clear. No oropharyngeal exudate or posterior oropharyngeal erythema.   Eyes:      General: No scleral icterus.        Right eye: No discharge.         Left eye: No discharge.      Extraocular Movements: Extraocular movements intact.      Conjunctiva/sclera: Conjunctivae normal.      Pupils: Pupils are equal, round, and reactive to light.   Neck:      Vascular: No carotid bruit.   Cardiovascular:      Rate and Rhythm: Normal rate and regular rhythm.      Pulses: Normal pulses.      Heart sounds: Normal heart sounds. No murmur heard.    No friction rub. No gallop.   Pulmonary:      Effort: Pulmonary effort is normal. No respiratory distress.      Breath sounds: Normal breath sounds. No stridor. No wheezing, rhonchi or rales.   Chest:      Chest wall: No tenderness.   Abdominal:      General: Abdomen is flat. Bowel sounds are normal. There is no distension.      Palpations: Abdomen is soft. There is no mass.      Tenderness: There is no abdominal tenderness. There is no right CVA tenderness, left CVA tenderness, guarding or rebound.      Hernia: No hernia is present.   Musculoskeletal:         General: No swelling, tenderness, deformity or signs of injury. Normal range of motion.      Cervical back: Normal range of motion and neck supple. No rigidity or tenderness.      Right lower leg: No edema.      Left lower leg: No edema.   Lymphadenopathy:      Cervical: No cervical adenopathy.   Skin:     General: Skin is warm and dry.      Capillary Refill: Capillary refill takes less than 2 seconds.      Coloration: Skin is not jaundiced or pale.      Findings: No bruising, erythema, lesion or rash.   Neurological:      General: No focal deficit present.      Mental Status: He is alert and oriented to person,  place, and time.      Cranial Nerves: No cranial nerve deficit.      Sensory: No sensory deficit.      Motor: No weakness.      Coordination: Coordination normal.      Gait: Gait normal.      Deep Tendon Reflexes: Reflexes normal.   Psychiatric:         Mood and Affect: Mood normal.         Behavior: Behavior normal.         Thought Content: Thought content normal.         Judgment: Judgment normal.         Overview/Hospital Course:  Bai catheter discontinued yesterday patient is still making good urine today, urine culture grew out Klebsiella pneumonia which is sensitive to Rocephin.  We will continue Rocephin 1 g daily as well as restart normal saline at 50 cc an hour to correct BUN and creatinine of 49 and 2.25.    Significant Labs: All pertinent labs within the past 24 hours have been reviewed.    Significant Imaging: I have reviewed all pertinent imaging results/findings within the past 24 hours.    Assessment/Plan:      Active Diagnoses:    Diagnosis Date Noted POA    PRINCIPAL PROBLEM:  Acute on chronic renal failure [N17.9, N18.9] 01/12/2023 Yes    Acute cystitis without hematuria [N30.00] 01/12/2023 Yes    Urinary retention due to benign prostatic hyperplasia [N40.1, R33.8] 01/12/2023 Yes    Benign prostatic hyperplasia without lower urinary tract symptoms [N40.0] 03/01/2022 Yes    Hyperthyroidism [E05.90] 08/24/2021 Yes    CHF (congestive heart failure) [I50.9] 06/09/2021 Yes    CKD (chronic kidney disease) [N18.9] 06/09/2021 Yes    Decreased appetite [R63.0] 06/09/2021 Yes    Diabetes [E11.9]  Yes    Essential hypertension [I10]  Yes      Problems Resolved During this Admission:     VTE Risk Mitigation (From admission, onward)           Ordered     enoxaparin injection 30 mg  Daily         01/11/23 1846     IP VTE HIGH RISK PATIENT  Once         01/11/23 1846                       SHIMA Chaney  Department of Hospital Medicine   [facility]

## 2023-01-14 NOTE — NURSING
Nurses Note -- 4 Eyes      1/14/2023   1:06 AM      Skin assessed during: Q Shift Change      [x] No Pressure Injuries Present    []Prevention Measures Documented      [] Yes- Altered Skin Integrity Present or Discovered   [] LDA Added if Not in Epic (Describe Wound)   [] New Altered Skin Integrity was Present on Admit and Documented in LDA   [] Wound Image Taken    Wound Care Consulted? No    Attending Nurse:  Miguelina Bai RN     Second RN/Staff Member: Keiko Traore LPN.

## 2023-01-14 NOTE — NURSING
Nurses Note -- 4 Eyes      1/14/2023   11:36 AM      Skin assessed during: Q Shift Change      [x] No Pressure Injuries Present    []Prevention Measures Documented      [] Yes- Altered Skin Integrity Present or Discovered   [] LDA Added if Not in Epic (Describe Wound)   [] New Altered Skin Integrity was Present on Admit and Documented in LDA   [] Wound Image Taken    Wound Care Consulted? No    Attending Nurse:  Keiko Traore LPN     Second RN/Staff Member: RAMSEY Bai RN

## 2023-01-15 LAB
ANION GAP SERPL CALCULATED.3IONS-SCNC: 13 MMOL/L (ref 7–16)
BASOPHILS # BLD AUTO: 0.01 K/UL (ref 0–0.2)
BASOPHILS NFR BLD AUTO: 0.2 % (ref 0–1)
BUN SERPL-MCNC: 41 MG/DL (ref 7–18)
BUN/CREAT SERPL: 23 (ref 6–20)
CALCIUM SERPL-MCNC: 8.2 MG/DL (ref 8.5–10.1)
CHLORIDE SERPL-SCNC: 103 MMOL/L (ref 98–107)
CO2 SERPL-SCNC: 25 MMOL/L (ref 21–32)
CREAT SERPL-MCNC: 1.78 MG/DL (ref 0.7–1.3)
DIFFERENTIAL METHOD BLD: ABNORMAL
EGFR (NO RACE VARIABLE) (RUSH/TITUS): 40 ML/MIN/1.73M²
EOSINOPHIL # BLD AUTO: 0.06 K/UL (ref 0–0.5)
EOSINOPHIL NFR BLD AUTO: 1 % (ref 1–4)
ERYTHROCYTE [DISTWIDTH] IN BLOOD BY AUTOMATED COUNT: 11.6 % (ref 11.5–14.5)
GLUCOSE SERPL-MCNC: 158 MG/DL (ref 70–105)
GLUCOSE SERPL-MCNC: 166 MG/DL (ref 74–106)
GLUCOSE SERPL-MCNC: 190 MG/DL (ref 70–105)
GLUCOSE SERPL-MCNC: 231 MG/DL (ref 70–105)
GLUCOSE SERPL-MCNC: 232 MG/DL (ref 70–105)
HCT VFR BLD AUTO: 25.8 % (ref 40–54)
HGB BLD-MCNC: 8.8 G/DL (ref 13.5–18)
LYMPHOCYTES # BLD AUTO: 0.75 K/UL (ref 1–4.8)
LYMPHOCYTES NFR BLD AUTO: 11.9 % (ref 27–41)
MCH RBC QN AUTO: 31.4 PG (ref 27–31)
MCHC RBC AUTO-ENTMCNC: 34.1 G/DL (ref 32–36)
MCV RBC AUTO: 92.1 FL (ref 80–96)
MONOCYTES # BLD AUTO: 1.12 K/UL (ref 0–0.8)
MONOCYTES NFR BLD AUTO: 17.8 % (ref 2–6)
MPC BLD CALC-MCNC: 10.7 FL (ref 9.4–12.4)
NEUTROPHILS # BLD AUTO: 4.34 K/UL (ref 1.8–7.7)
NEUTROPHILS NFR BLD AUTO: 69.1 % (ref 53–65)
PLATELET # BLD AUTO: 151 K/UL (ref 150–400)
POTASSIUM SERPL-SCNC: 4 MMOL/L (ref 3.5–5.1)
RBC # BLD AUTO: 2.8 M/UL (ref 4.6–6.2)
SODIUM SERPL-SCNC: 137 MMOL/L (ref 136–145)
WBC # BLD AUTO: 6.28 K/UL (ref 4.5–11)

## 2023-01-15 PROCEDURE — 80048 BASIC METABOLIC PNL TOTAL CA: CPT | Performed by: NURSE PRACTITIONER

## 2023-01-15 PROCEDURE — 25000003 PHARM REV CODE 250: Performed by: NURSE PRACTITIONER

## 2023-01-15 PROCEDURE — 85025 COMPLETE CBC W/AUTO DIFF WBC: CPT | Performed by: NURSE PRACTITIONER

## 2023-01-15 PROCEDURE — 11000001 HC ACUTE MED/SURG PRIVATE ROOM

## 2023-01-15 PROCEDURE — 63600175 PHARM REV CODE 636 W HCPCS: Performed by: NURSE PRACTITIONER

## 2023-01-15 PROCEDURE — 96366 THER/PROPH/DIAG IV INF ADDON: CPT

## 2023-01-15 PROCEDURE — 25000003 PHARM REV CODE 250: Performed by: HOSPITALIST

## 2023-01-15 PROCEDURE — 27000958

## 2023-01-15 PROCEDURE — 82962 GLUCOSE BLOOD TEST: CPT

## 2023-01-15 PROCEDURE — 36415 COLL VENOUS BLD VENIPUNCTURE: CPT | Performed by: NURSE PRACTITIONER

## 2023-01-15 PROCEDURE — 96361 HYDRATE IV INFUSION ADD-ON: CPT

## 2023-01-15 PROCEDURE — 96372 THER/PROPH/DIAG INJ SC/IM: CPT

## 2023-01-15 RX ORDER — INSULIN ASPART 100 [IU]/ML
0-5 INJECTION, SOLUTION INTRAVENOUS; SUBCUTANEOUS
Status: DISCONTINUED | OUTPATIENT
Start: 2023-01-15 | End: 2023-01-18 | Stop reason: HOSPADM

## 2023-01-15 RX ADMIN — GABAPENTIN 100 MG: 100 CAPSULE ORAL at 08:01

## 2023-01-15 RX ADMIN — BRIMONIDINE TARTRATE 1 DROP: 2 SOLUTION/ DROPS OPHTHALMIC at 06:01

## 2023-01-15 RX ADMIN — DORZOLAMIDE HYDROCHLORIDE AND TIMOLOL MALEATE 1 DROP: 20; 5 SOLUTION/ DROPS OPHTHALMIC at 08:01

## 2023-01-15 RX ADMIN — ALUMINA, MAGNESIA, AND SIMETHICONE ORAL SUSPENSION REGULAR STRENGTH 30 ML: 1200; 1200; 120 SUSPENSION ORAL at 05:01

## 2023-01-15 RX ADMIN — CEFTRIAXONE SODIUM 1 G: 1 INJECTION, POWDER, FOR SOLUTION INTRAMUSCULAR; INTRAVENOUS at 08:01

## 2023-01-15 RX ADMIN — DORZOLAMIDE HYDROCHLORIDE AND TIMOLOL MALEATE 1 DROP: 20; 5 SOLUTION/ DROPS OPHTHALMIC at 09:01

## 2023-01-15 RX ADMIN — INSULIN ASPART 1 UNITS: 100 INJECTION, SOLUTION INTRAVENOUS; SUBCUTANEOUS at 09:01

## 2023-01-15 RX ADMIN — GABAPENTIN 100 MG: 100 CAPSULE ORAL at 09:01

## 2023-01-15 RX ADMIN — BRIMONIDINE TARTRATE 1 DROP: 2 SOLUTION/ DROPS OPHTHALMIC at 09:01

## 2023-01-15 RX ADMIN — ENOXAPARIN SODIUM 30 MG: 100 INJECTION SUBCUTANEOUS at 04:01

## 2023-01-15 RX ADMIN — TAMSULOSIN HYDROCHLORIDE 0.8 MG: 0.4 CAPSULE ORAL at 08:01

## 2023-01-15 RX ADMIN — DORZOLAMIDE HYDROCHLORIDE AND TIMOLOL MALEATE 1 DROP: 20; 5 SOLUTION/ DROPS OPHTHALMIC at 02:01

## 2023-01-15 RX ADMIN — BRIMONIDINE TARTRATE 1 DROP: 2 SOLUTION/ DROPS OPHTHALMIC at 02:01

## 2023-01-15 RX ADMIN — INSULIN ASPART 2 UNITS: 100 INJECTION, SOLUTION INTRAVENOUS; SUBCUTANEOUS at 11:01

## 2023-01-15 RX ADMIN — BRIMONIDINE TARTRATE 1 DROP: 2 SOLUTION/ DROPS OPHTHALMIC at 12:01

## 2023-01-15 NOTE — NURSING
Nurses Note -- 4 Eyes      1/15/2023   12:44 PM      Skin assessed during: Daily Assessment      [x] No Pressure Injuries Present    []Prevention Measures Documented      [] Yes- Altered Skin Integrity Present or Discovered   [] LDA Added if Not in Epic (Describe Wound)   [] New Altered Skin Integrity was Present on Admit and Documented in LDA   [] Wound Image Taken    Wound Care Consulted? No    Attending Nurse:  MARLO FINCH RN     Second RN/Staff Member:  am rn

## 2023-01-15 NOTE — PROGRESS NOTES
[facility] Medicine  Progress Note    Patient Name: Jake Brown  MRN: [id number]  Patient Class: IP- Inpatient   Admission Date: 1/11/2023  Length of Stay: 2 days  Attending Physician: Doron Godwin DO  Primary Care Provider: Osvaldo Hodges MD        Subjective:     Principal Problem:Acute on chronic renal failure        HPI:  Mr Roland is a 74 y/o male that presented to [facility] ED with fever, N/V/D x2 days. He was found to have Urosepsis and LIZANDRO. WBC 27.9. Lactate 2.7. Creatinine 4.9, BUN 69. This is up from recent labs on 1/9 with creatinine 3.10. CT abdomen pelvis showed layering densities in gallbladder likely small stones. Amylase/Lipase WNL. Fever up to 102.8, improved to 101 with Tylenol. HR up to 122, improved with IV fluids. He had 2 episodes of hypotension while in ED but resolved with IV fluids. He exhibited no abd tenderness and Greenberg's sign negative. given Zofran and nausea subsided. No vomiting while in ED.     Code Status: FULL CODE    1/14/23; Patient more alert and awake today T-max last night 99.0 patient afebrile after 12 midnight.  Urine culture grew out Klebsiella pneumoniae, it is resistant to Rocephin.  We will continue antibiotics as prescribed, we will give 1 more L of normal saline at 50 cc an hour to help correct BUN of 49 and creatinine 2.21.      Overview/Hospital Course:  1/13 He seems better, labs slowly improving.  Still has mederos.  Will try to get it out today.  Continue IV ABX and gentle fluids.  Still may need additional time for treatment. Increased Flomax.     1/13 Called to patient's room by nursing staff due to acute AMS. Unable to perform neuro exam due to lack of patient cooperation. CT of head ordered and performed with no acute pathology. Nurse noted patient hypotensive. Fluid bolus ordered. Patient BP improved. AMS improved. Will continue to monitor.    1/15 Mr Brown is awake, oriented. He reports he feels a little better today.   He is still receiving IV rocephin and is slowly improving.  Will continue IV abx and gentle hydration      Interval History: slowly improving    Review of Systems   Constitutional:  Positive for fatigue. Negative for chills and fever.   Respiratory: Negative.     Cardiovascular: Negative.    Gastrointestinal: Negative.    Genitourinary:  Positive for decreased urine volume.   Skin: Negative.    Objective:     Vital Signs (Most Recent):  Temp: 98.2 °F (36.8 °C) (01/15/23 0822)  Pulse: 77 (01/15/23 0822)  Resp: 20 (01/15/23 0822)  BP: 120/77 (01/15/23 0822)  SpO2: 100 % (01/15/23 0822) Vital Signs (24h Range):  Temp:  [98 °F (36.7 °C)-99 °F (37.2 °C)] 98.2 °F (36.8 °C)  Pulse:  [69-91] 77  Resp:  [20] 20  SpO2:  [95 %-100 %] 100 %  BP: (120-169)/(70-90) 120/77     Weight: 82.9 kg (182 lb 12.2 oz)  Body mass index is 26.99 kg/m².    Intake/Output Summary (Last 24 hours) at 1/15/2023 0840  Last data filed at 1/15/2023 0437  Gross per 24 hour   Intake 1185 ml   Output 751 ml   Net 434 ml      Physical Exam  Constitutional:       Appearance: Normal appearance.   HENT:      Mouth/Throat:      Mouth: Mucous membranes are dry.   Cardiovascular:      Rate and Rhythm: Normal rate and regular rhythm.   Pulmonary:      Breath sounds: Normal breath sounds.   Abdominal:      General: Bowel sounds are normal.      Palpations: Abdomen is soft.   Genitourinary:     Comments: Urine dark zach  Musculoskeletal:         General: Normal range of motion.      Cervical back: Neck supple.   Skin:     General: Skin is warm and dry.   Neurological:      General: No focal deficit present.      Mental Status: He is alert and oriented to person, place, and time.   Psychiatric:         Mood and Affect: Mood normal.       Significant Labs: All pertinent labs within the past 24 hours have been reviewed.    Significant Imaging: I have reviewed all pertinent imaging results/findings within the past 24 hours.      Assessment/Plan:      * Acute on  chronic renal failure  Gentle fluids.  Monitor labs.   Improving.     Urinary retention due to benign prostatic hyperplasia    Continue to monitor with mederos.     Acute cystitis without hematuria  ABX and monitor.  Still no culture prelim.  Working on getting mederos out.       Benign prostatic hyperplasia without lower urinary tract symptoms    Continue BPH meds, has mederos now, will need to make sure he can urinate without or DC home with mederos.     Hyperthyroidism    Has exopthalmus.  Will recheck TSH    Decreased appetite  Likely renal related.  Follow.       CHF (congestive heart failure)  Patient is identified as having unknown type heart failure that is Chronic. CHF is currently controlled. Latest ECHO performed and demonstrates- No results found for this or any previous visit.  . Continue Beta Blocker and monitor clinical status closely. Monitor on telemetry. Patient is off CHF pathway.  Monitor strict Is&Os and daily weights.  Place on fluid restriction of No. Continue to stress to patient importance of self efficacy and  on diet for CHF. Last BNP reviewed- and noted below No results for input(s): BNP, BNPTRIAGEBLO in the last 168 hours..      CKD (chronic kidney disease)  Worse from baseline.  Gentle fluids and monitor closely.      Essential hypertension  Follow and adjust meds as tolerated.       Diabetes  Patient's FSGs are controlled on current medication regimen.  Last A1c reviewed-   Lab Results   Component Value Date    HGBA1C 6.2 01/11/2023     Most recent fingerstick glucose reviewed- No results for input(s): POCTGLUCOSE in the last 24 hours.  Current correctional scale  Low  Maintain anti-hyperglycemic dose as follows-   Antihyperglycemics (From admission, onward)      Start     Stop Route Frequency Ordered    01/11/23 1847  insulin aspart U-100 injection 0-5 Units         -- SubQ Every 6 hours PRN 01/11/23 1846          Hold Oral hypoglycemics while patient is in the hospital.      VTE Risk  Mitigation (From admission, onward)           Ordered     enoxaparin injection 30 mg  Daily         01/11/23 1846     IP VTE HIGH RISK PATIENT  Once         01/11/23 1846                    Discharge Planning   KARINA:      Code Status: Full Code   Is the patient medically ready for discharge?:     Reason for patient still in hospital (select all that apply): Patient trending condition                     SHIMA Webster  Department of Hospital Medicine   [facility]

## 2023-01-15 NOTE — SUBJECTIVE & OBJECTIVE
Interval History: slowly improving    Review of Systems   Constitutional:  Positive for fatigue. Negative for chills and fever.   Respiratory: Negative.     Cardiovascular: Negative.    Gastrointestinal: Negative.    Genitourinary:  Positive for decreased urine volume.   Skin: Negative.    Objective:     Vital Signs (Most Recent):  Temp: 98.2 °F (36.8 °C) (01/15/23 0822)  Pulse: 77 (01/15/23 0822)  Resp: 20 (01/15/23 0822)  BP: 120/77 (01/15/23 0822)  SpO2: 100 % (01/15/23 0822) Vital Signs (24h Range):  Temp:  [98 °F (36.7 °C)-99 °F (37.2 °C)] 98.2 °F (36.8 °C)  Pulse:  [69-91] 77  Resp:  [20] 20  SpO2:  [95 %-100 %] 100 %  BP: (120-169)/(70-90) 120/77     Weight: 82.9 kg (182 lb 12.2 oz)  Body mass index is 26.99 kg/m².    Intake/Output Summary (Last 24 hours) at 1/15/2023 0840  Last data filed at 1/15/2023 0437  Gross per 24 hour   Intake 1185 ml   Output 751 ml   Net 434 ml      Physical Exam  Constitutional:       Appearance: Normal appearance.   HENT:      Mouth/Throat:      Mouth: Mucous membranes are dry.   Cardiovascular:      Rate and Rhythm: Normal rate and regular rhythm.   Pulmonary:      Breath sounds: Normal breath sounds.   Abdominal:      General: Bowel sounds are normal.      Palpations: Abdomen is soft.   Genitourinary:     Comments: Urine dark zach  Musculoskeletal:         General: Normal range of motion.      Cervical back: Neck supple.   Skin:     General: Skin is warm and dry.   Neurological:      General: No focal deficit present.      Mental Status: He is alert and oriented to person, place, and time.   Psychiatric:         Mood and Affect: Mood normal.       Significant Labs: All pertinent labs within the past 24 hours have been reviewed.    Significant Imaging: I have reviewed all pertinent imaging results/findings within the past 24 hours.

## 2023-01-16 LAB
ANION GAP SERPL CALCULATED.3IONS-SCNC: 12 MMOL/L (ref 7–16)
BASOPHILS # BLD AUTO: 0.02 K/UL (ref 0–0.2)
BASOPHILS NFR BLD AUTO: 0.3 % (ref 0–1)
BUN SERPL-MCNC: 34 MG/DL (ref 7–18)
BUN/CREAT SERPL: 21 (ref 6–20)
CALCIUM SERPL-MCNC: 8.5 MG/DL (ref 8.5–10.1)
CHLORIDE SERPL-SCNC: 103 MMOL/L (ref 98–107)
CO2 SERPL-SCNC: 25 MMOL/L (ref 21–32)
CREAT SERPL-MCNC: 1.61 MG/DL (ref 0.7–1.3)
DIFFERENTIAL METHOD BLD: ABNORMAL
EGFR (NO RACE VARIABLE) (RUSH/TITUS): 45 ML/MIN/1.73M²
EOSINOPHIL # BLD AUTO: 0.06 K/UL (ref 0–0.5)
EOSINOPHIL NFR BLD AUTO: 1 % (ref 1–4)
ERYTHROCYTE [DISTWIDTH] IN BLOOD BY AUTOMATED COUNT: 11.6 % (ref 11.5–14.5)
GLUCOSE SERPL-MCNC: 163 MG/DL (ref 70–105)
GLUCOSE SERPL-MCNC: 178 MG/DL (ref 74–106)
GLUCOSE SERPL-MCNC: 184 MG/DL (ref 70–105)
GLUCOSE SERPL-MCNC: 189 MG/DL (ref 70–105)
GLUCOSE SERPL-MCNC: 227 MG/DL (ref 70–105)
HCT VFR BLD AUTO: 26 % (ref 40–54)
HGB BLD-MCNC: 8.9 G/DL (ref 13.5–18)
LYMPHOCYTES # BLD AUTO: 1.01 K/UL (ref 1–4.8)
LYMPHOCYTES NFR BLD AUTO: 17.1 % (ref 27–41)
MCH RBC QN AUTO: 31 PG (ref 27–31)
MCHC RBC AUTO-ENTMCNC: 34.2 G/DL (ref 32–36)
MCV RBC AUTO: 90.6 FL (ref 80–96)
MONOCYTES # BLD AUTO: 1 K/UL (ref 0–0.8)
MONOCYTES NFR BLD AUTO: 16.9 % (ref 2–6)
MPC BLD CALC-MCNC: 10.3 FL (ref 9.4–12.4)
NEUTROPHILS # BLD AUTO: 3.81 K/UL (ref 1.8–7.7)
NEUTROPHILS NFR BLD AUTO: 64.7 % (ref 53–65)
PLATELET # BLD AUTO: 178 K/UL (ref 150–400)
POTASSIUM SERPL-SCNC: 4.2 MMOL/L (ref 3.5–5.1)
RBC # BLD AUTO: 2.87 M/UL (ref 4.6–6.2)
SODIUM SERPL-SCNC: 136 MMOL/L (ref 136–145)
WBC # BLD AUTO: 5.9 K/UL (ref 4.5–11)

## 2023-01-16 PROCEDURE — 97163 PT EVAL HIGH COMPLEX 45 MIN: CPT

## 2023-01-16 PROCEDURE — 94761 N-INVAS EAR/PLS OXIMETRY MLT: CPT

## 2023-01-16 PROCEDURE — 63600175 PHARM REV CODE 636 W HCPCS: Mod: GZ | Performed by: NURSE PRACTITIONER

## 2023-01-16 PROCEDURE — 96366 THER/PROPH/DIAG IV INF ADDON: CPT

## 2023-01-16 PROCEDURE — 99232 PR SUBSEQUENT HOSPITAL CARE,LEVL II: ICD-10-PCS | Mod: ,,, | Performed by: HOSPITALIST

## 2023-01-16 PROCEDURE — 85025 COMPLETE CBC W/AUTO DIFF WBC: CPT | Performed by: NURSE PRACTITIONER

## 2023-01-16 PROCEDURE — 36415 COLL VENOUS BLD VENIPUNCTURE: CPT | Performed by: NURSE PRACTITIONER

## 2023-01-16 PROCEDURE — 51702 INSERT TEMP BLADDER CATH: CPT

## 2023-01-16 PROCEDURE — 80048 BASIC METABOLIC PNL TOTAL CA: CPT | Performed by: NURSE PRACTITIONER

## 2023-01-16 PROCEDURE — 25000003 PHARM REV CODE 250: Performed by: NURSE PRACTITIONER

## 2023-01-16 PROCEDURE — 25000003 PHARM REV CODE 250: Performed by: HOSPITALIST

## 2023-01-16 PROCEDURE — 27000958

## 2023-01-16 PROCEDURE — 63600175 PHARM REV CODE 636 W HCPCS: Performed by: NURSE PRACTITIONER

## 2023-01-16 PROCEDURE — 11000001 HC ACUTE MED/SURG PRIVATE ROOM

## 2023-01-16 PROCEDURE — 82962 GLUCOSE BLOOD TEST: CPT | Mod: 91

## 2023-01-16 PROCEDURE — 96372 THER/PROPH/DIAG INJ SC/IM: CPT

## 2023-01-16 PROCEDURE — 99232 SBSQ HOSP IP/OBS MODERATE 35: CPT | Mod: ,,, | Performed by: HOSPITALIST

## 2023-01-16 RX ORDER — MUPIROCIN 20 MG/G
OINTMENT TOPICAL 2 TIMES DAILY
Status: DISCONTINUED | OUTPATIENT
Start: 2023-01-16 | End: 2023-01-18 | Stop reason: HOSPADM

## 2023-01-16 RX ORDER — AMLODIPINE BESYLATE 5 MG/1
5 TABLET ORAL DAILY
Status: DISCONTINUED | OUTPATIENT
Start: 2023-01-16 | End: 2023-01-18 | Stop reason: HOSPADM

## 2023-01-16 RX ORDER — SULFAMETHOXAZOLE AND TRIMETHOPRIM 800; 160 MG/1; MG/1
1 TABLET ORAL 2 TIMES DAILY
Status: DISCONTINUED | OUTPATIENT
Start: 2023-01-16 | End: 2023-01-18 | Stop reason: HOSPADM

## 2023-01-16 RX ORDER — FINASTERIDE 5 MG/1
5 TABLET, FILM COATED ORAL DAILY
Status: DISCONTINUED | OUTPATIENT
Start: 2023-01-16 | End: 2023-01-18 | Stop reason: HOSPADM

## 2023-01-16 RX ADMIN — FINASTERIDE 5 MG: 5 TABLET, FILM COATED ORAL at 11:01

## 2023-01-16 RX ADMIN — SULFAMETHOXAZOLE AND TRIMETHOPRIM 1 TABLET: 800; 160 TABLET ORAL at 11:01

## 2023-01-16 RX ADMIN — BRIMONIDINE TARTRATE 1 DROP: 2 SOLUTION/ DROPS OPHTHALMIC at 05:01

## 2023-01-16 RX ADMIN — GABAPENTIN 100 MG: 100 CAPSULE ORAL at 08:01

## 2023-01-16 RX ADMIN — SULFAMETHOXAZOLE AND TRIMETHOPRIM 1 TABLET: 800; 160 TABLET ORAL at 08:01

## 2023-01-16 RX ADMIN — TAMSULOSIN HYDROCHLORIDE 0.8 MG: 0.4 CAPSULE ORAL at 09:01

## 2023-01-16 RX ADMIN — GABAPENTIN 100 MG: 100 CAPSULE ORAL at 09:01

## 2023-01-16 RX ADMIN — DORZOLAMIDE HYDROCHLORIDE AND TIMOLOL MALEATE 1 DROP: 20; 5 SOLUTION/ DROPS OPHTHALMIC at 09:01

## 2023-01-16 RX ADMIN — CEFTRIAXONE SODIUM 1 G: 1 INJECTION, POWDER, FOR SOLUTION INTRAMUSCULAR; INTRAVENOUS at 09:01

## 2023-01-16 RX ADMIN — BRIMONIDINE TARTRATE 1 DROP: 2 SOLUTION/ DROPS OPHTHALMIC at 08:01

## 2023-01-16 RX ADMIN — BRIMONIDINE TARTRATE 1 DROP: 2 SOLUTION/ DROPS OPHTHALMIC at 02:01

## 2023-01-16 RX ADMIN — ENOXAPARIN SODIUM 30 MG: 100 INJECTION SUBCUTANEOUS at 05:01

## 2023-01-16 RX ADMIN — AMLODIPINE BESYLATE 5 MG: 5 TABLET ORAL at 11:01

## 2023-01-16 RX ADMIN — INSULIN ASPART 1 UNITS: 100 INJECTION, SOLUTION INTRAVENOUS; SUBCUTANEOUS at 08:01

## 2023-01-16 RX ADMIN — DORZOLAMIDE HYDROCHLORIDE AND TIMOLOL MALEATE 1 DROP: 20; 5 SOLUTION/ DROPS OPHTHALMIC at 08:01

## 2023-01-16 RX ADMIN — DORZOLAMIDE HYDROCHLORIDE AND TIMOLOL MALEATE 1 DROP: 20; 5 SOLUTION/ DROPS OPHTHALMIC at 04:01

## 2023-01-16 RX ADMIN — MUPIROCIN: 20 OINTMENT TOPICAL at 11:01

## 2023-01-16 NOTE — PROGRESS NOTES
"Pt refused PT eval at 1110 stating he did not feel well enough. Pt informed PT will try again in 2-3 hours. He stated "they" were talking about him transferring to Swing Bed. Pt and family member present informed that in order to do so, PT will need to eval him first. Pt nodded understanding. Nurse arrived in room stating to pt that she was going to remove his IV. She stated, "he had a rough night last night."  "

## 2023-01-16 NOTE — PLAN OF CARE
Problem: Physical Therapy  Goal: Physical Therapy Goal  Description: LTG 3 weeks    1. Pt will have 4/5 BLE strength.  2. Pt svn with STS and bed transfers.  3. Pt SBA to negotiated stairs with rail.  4. Pt will amb with appropriate AD x 100 ft with SBA.  5. Pt svn with bed mob.  Outcome: Ongoing, Progressing

## 2023-01-16 NOTE — PLAN OF CARE
Problem: Infection  Goal: Absence of Infection Signs and Symptoms  Outcome: Ongoing, Progressing     Problem: Adult Inpatient Plan of Care  Goal: Plan of Care Review  Outcome: Ongoing, Progressing  Goal: Patient-Specific Goal (Individualized)  Outcome: Ongoing, Progressing  Goal: Optimal Comfort and Wellbeing  Outcome: Ongoing, Progressing     Problem: Diabetes Comorbidity  Goal: Blood Glucose Level Within Targeted Range  Outcome: Ongoing, Progressing     Problem: Skin Injury Risk Increased  Goal: Skin Health and Integrity  Outcome: Ongoing, Progressing     Problem: Fall Injury Risk  Goal: Absence of Fall and Fall-Related Injury  Outcome: Ongoing, Progressing     Problem: UTI (Urinary Tract Infection)  Goal: Improved Infection Symptoms  Outcome: Ongoing, Progressing

## 2023-01-16 NOTE — PROGRESS NOTES
[facility] Medicine  Progress Note    Patient Name: Jake Brown  MRN: [id number]  Patient Class: IP- Inpatient   Admission Date: 1/11/2023  Length of Stay: 3 days  Attending Physician: Doron Godwin DO  Primary Care Provider: Osvaldo Hodges MD        Subjective:     Principal Problem:Acute on chronic renal failure        HPI:  Mr Roland is a 74 y/o male that presented to [facility] ED with fever, N/V/D x2 days. He was found to have Urosepsis and LIZANDRO. WBC 27.9. Lactate 2.7. Creatinine 4.9, BUN 69. This is up from recent labs on 1/9 with creatinine 3.10. CT abdomen pelvis showed layering densities in gallbladder likely small stones. Amylase/Lipase WNL. Fever up to 102.8, improved to 101 with Tylenol. HR up to 122, improved with IV fluids. He had 2 episodes of hypotension while in ED but resolved with IV fluids. He exhibited no abd tenderness and Greenberg's sign negative. given Zofran and nausea subsided. No vomiting while in ED.     Code Status: FULL CODE    1/14/23; Patient more alert and awake today T-max last night 99.0 patient afebrile after 12 midnight.  Urine culture grew out Klebsiella pneumoniae, it is resistant to Rocephin.  We will continue antibiotics as prescribed, we will give 1 more L of normal saline at 50 cc an hour to help correct BUN of 49 and creatinine 2.21.      Overview/Hospital Course:  1/13 He seems better, labs slowly improving.  Still has mederos.  Will try to get it out today.  Continue IV ABX and gentle fluids.  Still may need additional time for treatment. Increased Flomax.     1/13 Called to patient's room by nursing staff due to acute AMS. Unable to perform neuro exam due to lack of patient cooperation. CT of head ordered and performed with no acute pathology. Nurse noted patient hypotensive. Fluid bolus ordered. Patient BP improved. AMS improved. Will continue to monitor.    1/15 Mr Brown is awake, oriented. He reports he feels a little better today.   He is still receiving IV rocephin and is slowly improving.  Will continue IV abx and gentle hydration    1/16 called by RAMSEY Mederos RN that Mr Brown only voiding 10 ml at a time and reporting fullness - ordered reinsertion of mederos for urinary retention    1/16 Had to get mederos placed back, got 1200cc of urine.  He feels better. BP increasing will add back home Norvasc.  More alert today. Urine culture K. Pneumonia.  S to Rocephin. Will change to PO ABX today.        Interval History: Mederos had to be placed back, got 1200cc urine.  Will change to PO ABX.  PT eval, remains weak and unsure safe to go home.  Need to continue to work on mederos.      Review of Systems   Respiratory:  Negative for shortness of breath.    Cardiovascular:  Negative for chest pain.   Gastrointestinal:  Negative for abdominal pain, nausea and vomiting.   Genitourinary:         Mederos placed for retention    Neurological:  Positive for weakness.   Psychiatric/Behavioral:  Negative for agitation and confusion.    All other systems reviewed and are negative.  Objective:     Vital Signs (Most Recent):  Temp: 97.6 °F (36.4 °C) (01/16/23 0702)  Pulse: 73 (01/16/23 0702)  Resp: 20 (01/16/23 0702)  BP: (!) 157/78 (01/16/23 0702)  SpO2: 99 % (01/16/23 0800)   Vital Signs (24h Range):  Temp:  [97.6 °F (36.4 °C)-98.2 °F (36.8 °C)] 97.6 °F (36.4 °C)  Pulse:  [68-94] 73  Resp:  [20] 20  SpO2:  [97 %-100 %] 99 %  BP: (127-196)/(67-94) 157/78     Weight: 81.2 kg (179 lb 0.2 oz)  Body mass index is 26.44 kg/m².    Intake/Output Summary (Last 24 hours) at 1/16/2023 0940  Last data filed at 1/16/2023 0701  Gross per 24 hour   Intake 897 ml   Output 3200 ml   Net -2303 ml      Physical Exam  Vitals reviewed.   Constitutional:       Appearance: Normal appearance.   HENT:      Mouth/Throat:      Mouth: Mucous membranes are dry.   Eyes:      Extraocular Movements: Extraocular movements intact.      Pupils: Pupils are equal, round, and reactive to light.      Comments:  Exopthalmus   Cardiovascular:      Rate and Rhythm: Normal rate and regular rhythm.   Pulmonary:      Effort: Pulmonary effort is normal. No respiratory distress.      Breath sounds: Normal breath sounds. No wheezing.   Abdominal:      General: Bowel sounds are normal. There is no distension.      Palpations: Abdomen is soft.      Tenderness: There is no abdominal tenderness.   Genitourinary:     Comments: + Bai   Musculoskeletal:         General: Normal range of motion.      Cervical back: Neck supple.   Skin:     General: Skin is warm and dry.      Coloration: Skin is not jaundiced.   Neurological:      General: No focal deficit present.      Mental Status: He is alert and oriented to person, place, and time. Mental status is at baseline.   Psychiatric:         Mood and Affect: Mood normal.         Thought Content: Thought content normal.       Significant Labs: All pertinent labs within the past 24 hours have been reviewed.  Recent Lab Results  (Last 5 results in the past 24 hours)        01/16/23  0710   01/16/23  0540   01/15/23  2104   01/15/23  1729   01/15/23  1116        Anion Gap 12               Baso # 0.02               Basophil % 0.3               BUN 34               BUN/CREAT RATIO 21               Calcium 8.5               Chloride 103               CO2 25               Creatinine 1.61               Differential Type Manual               eGFR 45               Eos # 0.06               Eosinophil % 1.0               Glucose 178               Hematocrit 26.0               Hemoglobin 8.9               Lymph # 1.01               Lymph % 17.1               MCH 31.0               MCHC 34.2               MCV 90.6               Mono # 1.00               Mono % 16.9               MPV 10.3               Neutrophils, Abs 3.81               Neutrophils Relative 64.7               Platelets 178               POC Glucose   184   232   190   231       Potassium 4.2               RBC 2.87               RDW 11.6                Sodium 136               WBC 5.90                                      Significant Imaging: I have reviewed all pertinent imaging results/findings within the past 24 hours.      Assessment/Plan:      * Acute on chronic renal failure  Gentle fluids.  Monitor labs.   Improving. Much better numbers than years previous.     Urinary retention due to benign prostatic hyperplasia    Continue to monitor with mederos.     Mederos placed back on 1/16 with 1200cc output     Acute cystitis without hematuria  K. Pneumonia S to Rocephin.    Also S to Bactrim.  Change to PO today.     Benign prostatic hyperplasia without lower urinary tract symptoms    On Max Flomax, adding Finasteride.  Will need Urology referral.     Hyperthyroidism    Has exopthalmus.  Will recheck TSH    Decreased appetite  Likely renal related.  Follow.       CHF (congestive heart failure)  Patient is identified as having unknown type heart failure that is Chronic. CHF is currently controlled. Latest ECHO performed and demonstrates- No results found for this or any previous visit.  . Continue Beta Blocker and monitor clinical status closely. Monitor on telemetry. Patient is off CHF pathway.  Monitor strict Is&Os and daily weights.  Place on fluid restriction of No. Continue to stress to patient importance of self efficacy and  on diet for CHF. Last BNP reviewed- and noted below No results for input(s): BNP, BNPTRIAGEBLO in the last 168 hours..      CKD (chronic kidney disease)  Worse from baseline.  Gentle fluids and monitor closely.      Essential hypertension  Follow and adjust meds as tolerated. Add Norvasc back 1/16    Diabetes  Patient's FSGs are controlled on current medication regimen.  Last A1c reviewed-   Lab Results   Component Value Date    HGBA1C 6.2 01/11/2023     Most recent fingerstick glucose reviewed- No results for input(s): POCTGLUCOSE in the last 24 hours.  Current correctional scale  Low  Maintain anti-hyperglycemic dose as  follows-   Antihyperglycemics (From admission, onward)    Start     Stop Route Frequency Ordered    01/11/23 1847  insulin aspart U-100 injection 0-5 Units         -- SubQ Every 6 hours PRN 01/11/23 1846        Hold Oral hypoglycemics while patient is in the hospital.      VTE Risk Mitigation (From admission, onward)         Ordered     enoxaparin injection 30 mg  Daily         01/11/23 1846     IP VTE HIGH RISK PATIENT  Once         01/11/23 1846                Discharge Planning   KARINA:      Code Status: Full Code   Is the patient medically ready for discharge?:     Reason for patient still in hospital (select all that apply): Patient trending condition, Laboratory test, Treatment and PT / OT recommendations                     Doron Godwin DO  Department of Hospital Medicine   [facility]

## 2023-01-16 NOTE — PLAN OF CARE
Problem: Urinary Retention  Goal: Effective Urinary Elimination  Outcome: Ongoing, Progressing   Patient tolerating catheter d/t onset of urinary retention

## 2023-01-16 NOTE — SUBJECTIVE & OBJECTIVE
Interval History: Mederos had to be placed back, got 1200cc urine.  Will change to PO ABX.  PT eval, remains weak and unsure safe to go home.  Need to continue to work on mederos.      Review of Systems   Respiratory:  Negative for shortness of breath.    Cardiovascular:  Negative for chest pain.   Gastrointestinal:  Negative for abdominal pain, nausea and vomiting.   Genitourinary:         Mederos placed for retention    Neurological:  Positive for weakness.   Psychiatric/Behavioral:  Negative for agitation and confusion.    All other systems reviewed and are negative.  Objective:     Vital Signs (Most Recent):  Temp: 97.6 °F (36.4 °C) (01/16/23 0702)  Pulse: 73 (01/16/23 0702)  Resp: 20 (01/16/23 0702)  BP: (!) 157/78 (01/16/23 0702)  SpO2: 99 % (01/16/23 0800)   Vital Signs (24h Range):  Temp:  [97.6 °F (36.4 °C)-98.2 °F (36.8 °C)] 97.6 °F (36.4 °C)  Pulse:  [68-94] 73  Resp:  [20] 20  SpO2:  [97 %-100 %] 99 %  BP: (127-196)/(67-94) 157/78     Weight: 81.2 kg (179 lb 0.2 oz)  Body mass index is 26.44 kg/m².    Intake/Output Summary (Last 24 hours) at 1/16/2023 0940  Last data filed at 1/16/2023 0701  Gross per 24 hour   Intake 897 ml   Output 3200 ml   Net -2303 ml      Physical Exam  Vitals reviewed.   Constitutional:       Appearance: Normal appearance.   HENT:      Mouth/Throat:      Mouth: Mucous membranes are dry.   Eyes:      Extraocular Movements: Extraocular movements intact.      Pupils: Pupils are equal, round, and reactive to light.      Comments: Exopthalmus   Cardiovascular:      Rate and Rhythm: Normal rate and regular rhythm.   Pulmonary:      Effort: Pulmonary effort is normal. No respiratory distress.      Breath sounds: Normal breath sounds. No wheezing.   Abdominal:      General: Bowel sounds are normal. There is no distension.      Palpations: Abdomen is soft.      Tenderness: There is no abdominal tenderness.   Genitourinary:     Comments: + Mederos   Musculoskeletal:         General: Normal range of  motion.      Cervical back: Neck supple.   Skin:     General: Skin is warm and dry.      Coloration: Skin is not jaundiced.   Neurological:      General: No focal deficit present.      Mental Status: He is alert and oriented to person, place, and time. Mental status is at baseline.   Psychiatric:         Mood and Affect: Mood normal.         Thought Content: Thought content normal.       Significant Labs: All pertinent labs within the past 24 hours have been reviewed.  Recent Lab Results  (Last 5 results in the past 24 hours)        01/16/23  0710   01/16/23  0540   01/15/23  2104   01/15/23  1729   01/15/23  1116        Anion Gap 12               Baso # 0.02               Basophil % 0.3               BUN 34               BUN/CREAT RATIO 21               Calcium 8.5               Chloride 103               CO2 25               Creatinine 1.61               Differential Type Manual               eGFR 45               Eos # 0.06               Eosinophil % 1.0               Glucose 178               Hematocrit 26.0               Hemoglobin 8.9               Lymph # 1.01               Lymph % 17.1               MCH 31.0               MCHC 34.2               MCV 90.6               Mono # 1.00               Mono % 16.9               MPV 10.3               Neutrophils, Abs 3.81               Neutrophils Relative 64.7               Platelets 178               POC Glucose   184   232   190   231       Potassium 4.2               RBC 2.87               RDW 11.6               Sodium 136               WBC 5.90                                      Significant Imaging: I have reviewed all pertinent imaging results/findings within the past 24 hours.

## 2023-01-16 NOTE — PT/OT/SLP EVAL
Physical Therapy Evaluation    Patient Name:  Jake Borwn   MRN:  [id number]    Recommendations:     Discharge Recommendations: home with home health upon dc from hosp. PT recommends pt be referred to Swing Bed Unit.  Discharge Equipment Recommendations: none   Barriers to discharge: Inaccessible home    Assessment:     Jake Brown is a 73 y.o. male admitted with a medical diagnosis of Acute on chronic renal failure and urosepsis.  He presents with the following impairments/functional limitations: weakness, impaired endurance, gait instability, impaired balance.Pt has multiple steps to enter home which he cannot currently negotiate.  He requires assistance with bed mobility, transfers and ambulation using a walker due to high falls risk. He has BLE weakness. He is lethargic and slow to respond to questions throughout eval process. PT recommends pt be transferred to Swing Bed Unit for a few weeks until he is safe to return home. He appears to have necessary DME in the home from his  brother.    Rehab Prognosis: Good; patient would benefit from acute skilled PT services to address these deficits and reach maximum level of function.    Recent Surgery: * No surgery found *      Plan:     During this hospitalization, patient to be seen 5 x/week to address the identified rehab impairments via gait training, therapeutic activities, therapeutic exercises and progress toward the following goals:    Plan of Care Expires:  23    Subjective     Chief Complaint: Weakness, lethargy, high falls risk. Pt states he has needed DME from his  brother (rollator, MWC, Shower chair, etc)  Patient/Family Comments/goals: PT recommends pt be transferred to Swing Bed Unit.  Pain/Comfort:  Pain Rating 1: 0/10    Patients cultural, spiritual, Religion conflicts given the current situation: no    Living Environment:  Pt lives with his nephew. He has 5 step to enter home with 2 rails. Nephew is not employed and can assist  "pt upon dc home. Pt also has family nearby to assist him.  Prior to admission, patients level of function was indep without AD in home and community; was driving.  Equipment used at home: none (Pt has rollator, SC, MWC, shower chair and grab bar that he inherited from his  brother.).  DME owned (not currently used):  rollator, SC, MWC, shower chair, grab bar .  Upon discharge, patient will have assistance from Swing Bed staff.    Objective:     Communicated with nursing prior to session. She states pt "had a rough night last night." Patient found HOB elevated with mederos catheter  upon PT entry to room.    General Precautions: Standard, fall, diabetic  Orthopedic Precautions:    Braces:    Respiratory Status: Room air    Exams:  Cognitive Exam:  Patient is oriented to Person, Place, Time, and Situation  RLE ROM: WFL  RLE Strength: Deficits: grossly 4-/5  LLE ROM: WFL  LLE Strength: Deficits: grossly 4-/5    Functional Mobility:  Bed Mobility:     Rolling Left:  minimum assistance  Rolling Right: minimum assistance  Scooting: stand by assistance  Supine to Sit: moderate assistance  Sit to Supine: moderate assistance  Transfers:     Sit to Stand:  moderate assistance with rolling walker  Bed to Chair: minimum assistance with  rolling walker  using  Stand Pivot  Gait: Pt amb'd with RW x 8 ft laterally right/left along EOB with min A x 1. Pt takes short step.   Stairs:  Pt ascended/descended UNABLAE with NA with NA with Activity did not occur.       AM-PAC 6 CLICK MOBILITY  Total Score:13       Treatment & Education:  EVAL performed. Pt educated in POC. He received a phone call before PT could explain frequency of treatment.    Patient left HOB elevated with call button in reach and mederos intact .    GOALS:   Multidisciplinary Problems       Physical Therapy Goals          Problem: Physical Therapy    Goal Priority Disciplines Outcome Goal Variances Interventions   Physical Therapy Goal     PT, PT/OT Ongoing, " Progressing     Description: LTG 3 weeks    1. Pt will have 4/5 BLE strength.  2. Pt svn with STS and bed transfers.  3. Pt SBA to negotiated stairs with rail.  4. Pt will amb with appropriate AD x 100 ft with SBA.  5. Pt svn with bed mob.                       History:     Past Medical History:   Diagnosis Date    CHF (congestive heart failure)     Diabetes     Hypertension        History reviewed. No pertinent surgical history.    Time Tracking:     PT Received On: 01/16/23  PT Start Time: 1518     PT Stop Time: 1535  PT Total Time (min): 17 min     Billable Minutes: Evaluation 17 min      01/16/2023

## 2023-01-16 NOTE — NURSING
Nurses Note -- 4 Eyes    1/15/2023  0704 PM      Skin assessed during: Q Shift Change      [x] No Pressure Injuries Present    []Prevention Measures Documented      [] Yes- Altered Skin Integrity Present or Discovered   [] LDA Added if Not in Epic (Describe Wound)   [] New Altered Skin Integrity was Present on Admit and Documented in LDA   [] Wound Image Taken    Wound Care Consulted? No    Attending Nurse:  Suyapa Saha LPN    Second RN/Staff Member:  Osiel MURPHY

## 2023-01-16 NOTE — NURSING
Per Leela PCT, pt removed $500 cash from his gown pocket on previous shift and sent it home with his brother, Jake.

## 2023-01-16 NOTE — NURSING
Urinary catheter intact and flowing with out difficulty. Has had approx 600-800 output this morning. Abdomen soft non distended .

## 2023-01-17 LAB
ANION GAP SERPL CALCULATED.3IONS-SCNC: 12 MMOL/L (ref 7–16)
BUN SERPL-MCNC: 34 MG/DL (ref 7–18)
BUN/CREAT SERPL: 19 (ref 6–20)
CALCIUM SERPL-MCNC: 8.5 MG/DL (ref 8.5–10.1)
CHLORIDE SERPL-SCNC: 105 MMOL/L (ref 98–107)
CO2 SERPL-SCNC: 26 MMOL/L (ref 21–32)
CREAT SERPL-MCNC: 1.76 MG/DL (ref 0.7–1.3)
EGFR (NO RACE VARIABLE) (RUSH/TITUS): 40 ML/MIN/1.73M²
GLUCOSE SERPL-MCNC: 134 MG/DL (ref 74–106)
GLUCOSE SERPL-MCNC: 141 MG/DL (ref 70–105)
GLUCOSE SERPL-MCNC: 161 MG/DL (ref 70–105)
GLUCOSE SERPL-MCNC: 194 MG/DL (ref 70–105)
GLUCOSE SERPL-MCNC: 215 MG/DL (ref 70–105)
POTASSIUM SERPL-SCNC: 4.1 MMOL/L (ref 3.5–5.1)
SODIUM SERPL-SCNC: 139 MMOL/L (ref 136–145)

## 2023-01-17 PROCEDURE — 63600175 PHARM REV CODE 636 W HCPCS: Performed by: NURSE PRACTITIONER

## 2023-01-17 PROCEDURE — 11000001 HC ACUTE MED/SURG PRIVATE ROOM

## 2023-01-17 PROCEDURE — 97110 THERAPEUTIC EXERCISES: CPT | Mod: CQ

## 2023-01-17 PROCEDURE — 97116 GAIT TRAINING THERAPY: CPT | Mod: CQ

## 2023-01-17 PROCEDURE — 96372 THER/PROPH/DIAG INJ SC/IM: CPT | Mod: 59

## 2023-01-17 PROCEDURE — 80048 BASIC METABOLIC PNL TOTAL CA: CPT | Performed by: NURSE PRACTITIONER

## 2023-01-17 PROCEDURE — 25000003 PHARM REV CODE 250: Performed by: HOSPITALIST

## 2023-01-17 PROCEDURE — 99232 SBSQ HOSP IP/OBS MODERATE 35: CPT | Mod: ,,, | Performed by: HOSPITALIST

## 2023-01-17 PROCEDURE — 82962 GLUCOSE BLOOD TEST: CPT | Mod: 91

## 2023-01-17 PROCEDURE — 36415 COLL VENOUS BLD VENIPUNCTURE: CPT | Performed by: NURSE PRACTITIONER

## 2023-01-17 PROCEDURE — 27000958

## 2023-01-17 PROCEDURE — 94761 N-INVAS EAR/PLS OXIMETRY MLT: CPT

## 2023-01-17 PROCEDURE — 99232 PR SUBSEQUENT HOSPITAL CARE,LEVL II: ICD-10-PCS | Mod: ,,, | Performed by: HOSPITALIST

## 2023-01-17 RX ADMIN — GABAPENTIN 100 MG: 100 CAPSULE ORAL at 08:01

## 2023-01-17 RX ADMIN — DORZOLAMIDE HYDROCHLORIDE AND TIMOLOL MALEATE 1 DROP: 20; 5 SOLUTION/ DROPS OPHTHALMIC at 08:01

## 2023-01-17 RX ADMIN — FINASTERIDE 5 MG: 5 TABLET, FILM COATED ORAL at 08:01

## 2023-01-17 RX ADMIN — AMLODIPINE BESYLATE 5 MG: 5 TABLET ORAL at 08:01

## 2023-01-17 RX ADMIN — MUPIROCIN: 20 OINTMENT TOPICAL at 08:01

## 2023-01-17 RX ADMIN — SULFAMETHOXAZOLE AND TRIMETHOPRIM 1 TABLET: 800; 160 TABLET ORAL at 08:01

## 2023-01-17 RX ADMIN — BRIMONIDINE TARTRATE 1 DROP: 2 SOLUTION/ DROPS OPHTHALMIC at 09:01

## 2023-01-17 RX ADMIN — BRIMONIDINE TARTRATE 1 DROP: 2 SOLUTION/ DROPS OPHTHALMIC at 05:01

## 2023-01-17 RX ADMIN — DORZOLAMIDE HYDROCHLORIDE AND TIMOLOL MALEATE 1 DROP: 20; 5 SOLUTION/ DROPS OPHTHALMIC at 02:01

## 2023-01-17 RX ADMIN — ENOXAPARIN SODIUM 30 MG: 100 INJECTION SUBCUTANEOUS at 05:01

## 2023-01-17 RX ADMIN — INSULIN ASPART 2 UNITS: 100 INJECTION, SOLUTION INTRAVENOUS; SUBCUTANEOUS at 05:01

## 2023-01-17 RX ADMIN — TAMSULOSIN HYDROCHLORIDE 0.8 MG: 0.4 CAPSULE ORAL at 08:01

## 2023-01-17 RX ADMIN — BRIMONIDINE TARTRATE 1 DROP: 2 SOLUTION/ DROPS OPHTHALMIC at 02:01

## 2023-01-17 NOTE — SUBJECTIVE & OBJECTIVE
Interval History: no major issues, still has mederos. Plan for SWB if approved.     Review of Systems   Respiratory:  Negative for shortness of breath.    Cardiovascular:  Negative for chest pain.   Gastrointestinal:  Negative for abdominal pain, nausea and vomiting.   Genitourinary:  Positive for difficulty urinating.   Neurological:  Positive for weakness.   Psychiatric/Behavioral:  Negative for agitation and confusion.    All other systems reviewed and are negative.  Objective:     Vital Signs (Most Recent):  Temp: 98.3 °F (36.8 °C) (01/17/23 1147)  Pulse: 76 (01/17/23 1147)  Resp: 20 (01/17/23 1147)  BP: (!) 139/59 (01/17/23 1147)  SpO2: 100 % (01/17/23 1147)   Vital Signs (24h Range):  Temp:  [97.7 °F (36.5 °C)-98.7 °F (37.1 °C)] 98.3 °F (36.8 °C)  Pulse:  [72-79] 76  Resp:  [18-20] 20  SpO2:  [96 %-100 %] 100 %  BP: (139-156)/(59-96) 139/59     Weight: 81.9 kg (180 lb 8.9 oz)  Body mass index is 26.66 kg/m².    Intake/Output Summary (Last 24 hours) at 1/17/2023 1322  Last data filed at 1/17/2023 0910  Gross per 24 hour   Intake 700 ml   Output 2000 ml   Net -1300 ml      Physical Exam  Vitals reviewed.   Constitutional:       Appearance: Normal appearance.   HENT:      Mouth/Throat:      Mouth: Mucous membranes are dry.   Eyes:      Extraocular Movements: Extraocular movements intact.      Pupils: Pupils are equal, round, and reactive to light.      Comments: Exopthalmus   Cardiovascular:      Rate and Rhythm: Normal rate and regular rhythm.   Pulmonary:      Effort: Pulmonary effort is normal. No respiratory distress.      Breath sounds: Normal breath sounds. No wheezing.   Abdominal:      General: Bowel sounds are normal. There is no distension.      Palpations: Abdomen is soft.      Tenderness: There is no abdominal tenderness.   Genitourinary:     Comments: + Mederos   Musculoskeletal:         General: Normal range of motion.      Cervical back: Neck supple.   Skin:     General: Skin is warm and dry.       Coloration: Skin is not jaundiced.   Neurological:      General: No focal deficit present.      Mental Status: He is alert and oriented to person, place, and time. Mental status is at baseline.   Psychiatric:         Mood and Affect: Mood normal.         Thought Content: Thought content normal.       Significant Labs: All pertinent labs within the past 24 hours have been reviewed.  Recent Lab Results  (Last 5 results in the past 24 hours)        01/17/23  1106   01/17/23  0553   01/17/23  0531   01/16/23  2019   01/16/23  1647        Anion Gap     12           BUN     34           BUN/CREAT RATIO     19           Calcium     8.5           Chloride     105           CO2     26           Creatinine     1.76           eGFR     40           Glucose     134           POC Glucose 161   141     227   163       Potassium     4.1           Sodium     139                                  Significant Imaging: I have reviewed all pertinent imaging results/findings within the past 24 hours.

## 2023-01-17 NOTE — PLAN OF CARE
Problem: Adult Inpatient Plan of Care  Goal: Optimal Comfort and Wellbeing  Outcome: Ongoing, Progressing   Patient will be pain free before discharge.

## 2023-01-17 NOTE — PLAN OF CARE
01/13/23 0848   Discharge Assessment   Assessment Type Discharge Planning Assessment   Confirmed/corrected address, phone number and insurance Yes   Source of Information patient   Reason For Admission medical treatment   People in Home alone   Do you expect to return to your current living situation? Yes   Do you have help at home or someone to help you manage your care at home? Yes   Who are your caregiver(s) and their phone number(s)? sister- [phone]   Prior to hospitilization cognitive status: Alert/Oriented   Current cognitive status: Alert/Oriented   Equipment Currently Used at Home rollator;shower chair;grab bar   Readmission within 30 days? No   Do you take prescription medications? Yes   Do you have prescription coverage? Yes   Do you have any problems affording any of your prescribed medications? No   How do you get to doctors appointments? car, drives self;family or friend will provide   Are you on dialysis? No   Do you take coumadin? No   DME Needed Upon Discharge  other (see comments)  (TBD)   Discharge Plan discussed with: Patient   Discharge Barriers Identified None   Physical Activity   On average, how many days per week do you engage in moderate to strenuous exercise (like a brisk walk)? 0 days   On average, how many minutes do you engage in exercise at this level? 0 min   Financial Resource Strain   How hard is it for you to pay for the very basics like food, housing, medical care, and heating? Not very   Housing Stability   In the last 12 months, was there a time when you were not able to pay the mortgage or rent on time? Y   In the last 12 months, how many places have you lived? 1   Transportation Needs   In the past 12 months, has lack of transportation kept you from medical appointments or from getting medications? no   In the past 12 months, has lack of transportation kept you from meetings, work, or from getting things needed for daily living? No   Food Insecurity   Within the past 12  months, you worried that your food would run out before you got the money to buy more. Never true   Within the past 12 months, the food you bought just didn't last and you didn't have money to get more. Never true   Stress   Do you feel stress - tense, restless, nervous, or anxious, or unable to sleep at night because your mind is troubled all the time - these days? Only a littl   Social Connections   In a typical week, how many times do you talk on the phone with family, friends, or neighbors? Never   How often do you get together with friends or relatives? Never   How often do you attend Druze or Advent services? Never   Do you belong to any clubs or organizations such as Druze groups, unions, fraternal or athletic groups, or school groups? No   How often do you attend meetings of the clubs or organizations you belong to? 1 to 4   Are you , , , , never , or living with a partner?    Alcohol Use   Q1: How often do you have a drink containing alcohol? Never   Q2: How many drinks containing alcohol do you have on a typical day when you are drinking? None   Q3: How often do you have six or more drinks on one occasion? Never     Patient admitted for medical management of acute/chronic renal failure. Will plan to dc home with home health. Possible swingbed candidate. Will monitor discharge needs throughout admission

## 2023-01-17 NOTE — NURSING
Nurses Note -- 4 Eyes      1/16/2023   11:27 PM      Skin assessed during: Q Shift Change      [x] No Pressure Injuries Present    []Prevention Measures Documented      [] Yes- Altered Skin Integrity Present or Discovered   [] LDA Added if Not in Epic (Describe Wound)   [] New Altered Skin Integrity was Present on Admit and Documented in LDA   [] Wound Image Taken    Wound Care Consulted? No    Attending Nurse:  Miguelina Bai RN     Second RN/Staff Member:  MICK Vann RN

## 2023-01-17 NOTE — PROGRESS NOTES
[facility] Medicine  Progress Note    Patient Name: Jake Brown  MRN: [id number]  Patient Class: IP- Inpatient   Admission Date: 1/11/2023  Length of Stay: 4 days  Attending Physician: Doron Godwin DO  Primary Care Provider: Osvaldo Hodges MD        Subjective:     Principal Problem:Acute on chronic renal failure        HPI:  Mr Roland is a 74 y/o male that presented to [facility] ED with fever, N/V/D x2 days. He was found to have Urosepsis and LIZANDRO. WBC 27.9. Lactate 2.7. Creatinine 4.9, BUN 69. This is up from recent labs on 1/9 with creatinine 3.10. CT abdomen pelvis showed layering densities in gallbladder likely small stones. Amylase/Lipase WNL. Fever up to 102.8, improved to 101 with Tylenol. HR up to 122, improved with IV fluids. He had 2 episodes of hypotension while in ED but resolved with IV fluids. He exhibited no abd tenderness and Greenberg's sign negative. given Zofran and nausea subsided. No vomiting while in ED.     Code Status: FULL CODE    1/14/23; Patient more alert and awake today T-max last night 99.0 patient afebrile after 12 midnight.  Urine culture grew out Klebsiella pneumoniae, it is resistant to Rocephin.  We will continue antibiotics as prescribed, we will give 1 more L of normal saline at 50 cc an hour to help correct BUN of 49 and creatinine 2.21.      Overview/Hospital Course:  1/13 He seems better, labs slowly improving.  Still has mederos.  Will try to get it out today.  Continue IV ABX and gentle fluids.  Still may need additional time for treatment. Increased Flomax.     1/13 Called to patient's room by nursing staff due to acute AMS. Unable to perform neuro exam due to lack of patient cooperation. CT of head ordered and performed with no acute pathology. Nurse noted patient hypotensive. Fluid bolus ordered. Patient BP improved. AMS improved. Will continue to monitor.    1/15 Mr Brown is awake, oriented. He reports he feels a little better today.   He is still receiving IV rocephin and is slowly improving.  Will continue IV abx and gentle hydration    1/16 called by RAMSEY Mederos RN that Mr Brown only voiding 10 ml at a time and reporting fullness - ordered reinsertion of mederos for urinary retention    1/16 Had to get mederos placed back, got 1200cc of urine.  He feels better. BP increasing will add back home Norvasc.  More alert today. Urine culture K. Pneumonia.  S to Rocephin. Will change to PO ABX today.      1/17 Seems to be doing well, still has Mederos catheter.  Monitoring labs.  PT eval done.  Awaiting SWB approval for therapy and mederos removal if possible.        Interval History: no major issues, still has mederos. Plan for SWB if approved.     Review of Systems   Respiratory:  Negative for shortness of breath.    Cardiovascular:  Negative for chest pain.   Gastrointestinal:  Negative for abdominal pain, nausea and vomiting.   Genitourinary:  Positive for difficulty urinating.   Neurological:  Positive for weakness.   Psychiatric/Behavioral:  Negative for agitation and confusion.    All other systems reviewed and are negative.  Objective:     Vital Signs (Most Recent):  Temp: 98.3 °F (36.8 °C) (01/17/23 1147)  Pulse: 76 (01/17/23 1147)  Resp: 20 (01/17/23 1147)  BP: (!) 139/59 (01/17/23 1147)  SpO2: 100 % (01/17/23 1147)   Vital Signs (24h Range):  Temp:  [97.7 °F (36.5 °C)-98.7 °F (37.1 °C)] 98.3 °F (36.8 °C)  Pulse:  [72-79] 76  Resp:  [18-20] 20  SpO2:  [96 %-100 %] 100 %  BP: (139-156)/(59-96) 139/59     Weight: 81.9 kg (180 lb 8.9 oz)  Body mass index is 26.66 kg/m².    Intake/Output Summary (Last 24 hours) at 1/17/2023 1322  Last data filed at 1/17/2023 0910  Gross per 24 hour   Intake 700 ml   Output 2000 ml   Net -1300 ml      Physical Exam  Vitals reviewed.   Constitutional:       Appearance: Normal appearance.   HENT:      Mouth/Throat:      Mouth: Mucous membranes are dry.   Eyes:      Extraocular Movements: Extraocular movements intact.       Pupils: Pupils are equal, round, and reactive to light.      Comments: Exopthalmus   Cardiovascular:      Rate and Rhythm: Normal rate and regular rhythm.   Pulmonary:      Effort: Pulmonary effort is normal. No respiratory distress.      Breath sounds: Normal breath sounds. No wheezing.   Abdominal:      General: Bowel sounds are normal. There is no distension.      Palpations: Abdomen is soft.      Tenderness: There is no abdominal tenderness.   Genitourinary:     Comments: + Mederos   Musculoskeletal:         General: Normal range of motion.      Cervical back: Neck supple.   Skin:     General: Skin is warm and dry.      Coloration: Skin is not jaundiced.   Neurological:      General: No focal deficit present.      Mental Status: He is alert and oriented to person, place, and time. Mental status is at baseline.   Psychiatric:         Mood and Affect: Mood normal.         Thought Content: Thought content normal.       Significant Labs: All pertinent labs within the past 24 hours have been reviewed.  Recent Lab Results  (Last 5 results in the past 24 hours)        01/17/23  1106   01/17/23  0553   01/17/23  0531   01/16/23  2019   01/16/23  1647        Anion Gap     12           BUN     34           BUN/CREAT RATIO     19           Calcium     8.5           Chloride     105           CO2     26           Creatinine     1.76           eGFR     40           Glucose     134           POC Glucose 161   141     227   163       Potassium     4.1           Sodium     139                                  Significant Imaging: I have reviewed all pertinent imaging results/findings within the past 24 hours.      Assessment/Plan:      * Acute on chronic renal failure  Gentle fluids.  Monitor labs.   Improving. Much better numbers than years previous.     Urinary retention due to benign prostatic hyperplasia    Continue to monitor with mederos.     Mederos placed back on 1/16 with 1200cc output     Acute cystitis without  hematuria  K. Pneumonia S to Rocephin.    Also S to Bactrim.  Change to PO today.     Benign prostatic hyperplasia without lower urinary tract symptoms    On Max Flomax, adding Finasteride.  Will need Urology referral.     Hyperthyroidism    Has exopthalmus.  Will recheck TSH    Decreased appetite  Likely renal related.  Follow.       CHF (congestive heart failure)  Patient is identified as having unknown type heart failure that is Chronic. CHF is currently controlled. Latest ECHO performed and demonstrates- No results found for this or any previous visit.  . Continue Beta Blocker and monitor clinical status closely. Monitor on telemetry. Patient is off CHF pathway.  Monitor strict Is&Os and daily weights.  Place on fluid restriction of No. Continue to stress to patient importance of self efficacy and  on diet for CHF. Last BNP reviewed- and noted below No results for input(s): BNP, BNPTRIAGEBLO in the last 168 hours..      CKD (chronic kidney disease)  Worse from baseline.  Gentle fluids and monitor closely.      Essential hypertension  Follow and adjust meds as tolerated. Add Norvasc back 1/16    Diabetes  Patient's FSGs are controlled on current medication regimen.  Last A1c reviewed-   Lab Results   Component Value Date    HGBA1C 6.2 01/11/2023     Most recent fingerstick glucose reviewed- No results for input(s): POCTGLUCOSE in the last 24 hours.  Current correctional scale  Low  Maintain anti-hyperglycemic dose as follows-   Antihyperglycemics (From admission, onward)    Start     Stop Route Frequency Ordered    01/11/23 1847  insulin aspart U-100 injection 0-5 Units         -- SubQ Every 6 hours PRN 01/11/23 1846        Hold Oral hypoglycemics while patient is in the hospital.      VTE Risk Mitigation (From admission, onward)         Ordered     enoxaparin injection 30 mg  Daily         01/11/23 1846     IP VTE HIGH RISK PATIENT  Once         01/11/23 1846                Discharge Planning   KARINA:       Code Status: Full Code   Is the patient medically ready for discharge?:     Reason for patient still in hospital (select all that apply): Laboratory test, Treatment and PT / OT recommendations                     Doron Godwin DO  Department of Hospital Medicine   [facility]

## 2023-01-18 ENCOUNTER — HOSPITAL ENCOUNTER (INPATIENT)
Facility: HOSPITAL | Age: 73
LOS: 10 days | Discharge: HOME | DRG: 948 | End: 2023-01-28
Attending: HOSPITALIST | Admitting: HOSPITALIST
Payer: COMMERCIAL

## 2023-01-18 VITALS
OXYGEN SATURATION: 98 % | WEIGHT: 176.81 LBS | HEART RATE: 75 BPM | TEMPERATURE: 98 F | RESPIRATION RATE: 20 BRPM | BODY MASS INDEX: 26.19 KG/M2 | DIASTOLIC BLOOD PRESSURE: 59 MMHG | HEIGHT: 69 IN | SYSTOLIC BLOOD PRESSURE: 109 MMHG

## 2023-01-18 DIAGNOSIS — R53.1 WEAKNESS: Primary | ICD-10-CM

## 2023-01-18 DIAGNOSIS — A41.9 SEPSIS DUE TO URINARY TRACT INFECTION: ICD-10-CM

## 2023-01-18 DIAGNOSIS — N39.0 SEPSIS DUE TO URINARY TRACT INFECTION: ICD-10-CM

## 2023-01-18 PROBLEM — R63.0 DECREASED APPETITE: Status: RESOLVED | Noted: 2021-06-09 | Resolved: 2023-01-18

## 2023-01-18 PROBLEM — N18.9 ACUTE ON CHRONIC RENAL FAILURE: Status: RESOLVED | Noted: 2023-01-12 | Resolved: 2023-01-18

## 2023-01-18 PROBLEM — N30.00 ACUTE CYSTITIS WITHOUT HEMATURIA: Status: RESOLVED | Noted: 2023-01-12 | Resolved: 2023-01-18

## 2023-01-18 PROBLEM — N17.9 ACUTE ON CHRONIC RENAL FAILURE: Status: RESOLVED | Noted: 2023-01-12 | Resolved: 2023-01-18

## 2023-01-18 LAB
ANION GAP SERPL CALCULATED.3IONS-SCNC: 9 MMOL/L (ref 7–16)
BACTERIA BLD CULT: NORMAL
BACTERIA BLD CULT: NORMAL
BUN SERPL-MCNC: 31 MG/DL (ref 7–18)
BUN/CREAT SERPL: 17 (ref 6–20)
CALCIUM SERPL-MCNC: 8.2 MG/DL (ref 8.5–10.1)
CHLORIDE SERPL-SCNC: 103 MMOL/L (ref 98–107)
CO2 SERPL-SCNC: 28 MMOL/L (ref 21–32)
CREAT SERPL-MCNC: 1.84 MG/DL (ref 0.7–1.3)
EGFR (NO RACE VARIABLE) (RUSH/TITUS): 38 ML/MIN/1.73M²
GLUCOSE SERPL-MCNC: 153 MG/DL (ref 74–106)
GLUCOSE SERPL-MCNC: 154 MG/DL (ref 70–105)
GLUCOSE SERPL-MCNC: 169 MG/DL (ref 70–105)
GLUCOSE SERPL-MCNC: 201 MG/DL (ref 70–105)
GLUCOSE SERPL-MCNC: 245 MG/DL (ref 70–105)
POTASSIUM SERPL-SCNC: 4.2 MMOL/L (ref 3.5–5.1)
SODIUM SERPL-SCNC: 136 MMOL/L (ref 136–145)

## 2023-01-18 PROCEDURE — 63600175 PHARM REV CODE 636 W HCPCS: Performed by: NURSE PRACTITIONER

## 2023-01-18 PROCEDURE — 99239 HOSP IP/OBS DSCHRG MGMT >30: CPT | Mod: ,,, | Performed by: HOSPITALIST

## 2023-01-18 PROCEDURE — 82962 GLUCOSE BLOOD TEST: CPT | Mod: 91

## 2023-01-18 PROCEDURE — 82962 GLUCOSE BLOOD TEST: CPT

## 2023-01-18 PROCEDURE — 94761 N-INVAS EAR/PLS OXIMETRY MLT: CPT

## 2023-01-18 PROCEDURE — 11000004 HC SNF PRIVATE

## 2023-01-18 PROCEDURE — 63600175 PHARM REV CODE 636 W HCPCS: Performed by: HOSPITALIST

## 2023-01-18 PROCEDURE — 96372 THER/PROPH/DIAG INJ SC/IM: CPT | Mod: 59

## 2023-01-18 PROCEDURE — 80048 BASIC METABOLIC PNL TOTAL CA: CPT | Performed by: NURSE PRACTITIONER

## 2023-01-18 PROCEDURE — 97116 GAIT TRAINING THERAPY: CPT | Mod: CQ

## 2023-01-18 PROCEDURE — 97110 THERAPEUTIC EXERCISES: CPT | Mod: CQ

## 2023-01-18 PROCEDURE — 97165 OT EVAL LOW COMPLEX 30 MIN: CPT

## 2023-01-18 PROCEDURE — 36415 COLL VENOUS BLD VENIPUNCTURE: CPT | Performed by: NURSE PRACTITIONER

## 2023-01-18 PROCEDURE — 27000958

## 2023-01-18 PROCEDURE — 25000003 PHARM REV CODE 250: Performed by: HOSPITALIST

## 2023-01-18 PROCEDURE — 99239 PR HOSPITAL DISCHARGE DAY,>30 MIN: ICD-10-PCS | Mod: ,,, | Performed by: HOSPITALIST

## 2023-01-18 RX ORDER — GLUCAGON 1 MG
1 KIT INJECTION
Status: CANCELLED | OUTPATIENT
Start: 2023-01-18

## 2023-01-18 RX ORDER — ONDANSETRON 2 MG/ML
4 INJECTION INTRAMUSCULAR; INTRAVENOUS EVERY 8 HOURS PRN
Status: DISCONTINUED | OUTPATIENT
Start: 2023-01-18 | End: 2023-01-28 | Stop reason: HOSPADM

## 2023-01-18 RX ORDER — MAG HYDROX/ALUMINUM HYD/SIMETH 200-200-20
30 SUSPENSION, ORAL (FINAL DOSE FORM) ORAL EVERY 6 HOURS PRN
Status: CANCELLED | OUTPATIENT
Start: 2023-01-18

## 2023-01-18 RX ORDER — CALCIUM CARBONATE 200(500)MG
500 TABLET,CHEWABLE ORAL 2 TIMES DAILY PRN
Status: DISCONTINUED | OUTPATIENT
Start: 2023-01-18 | End: 2023-01-28 | Stop reason: HOSPADM

## 2023-01-18 RX ORDER — INSULIN ASPART 100 [IU]/ML
0-5 INJECTION, SOLUTION INTRAVENOUS; SUBCUTANEOUS
Status: DISCONTINUED | OUTPATIENT
Start: 2023-01-18 | End: 2023-01-28 | Stop reason: HOSPADM

## 2023-01-18 RX ORDER — ONDANSETRON 2 MG/ML
4 INJECTION INTRAMUSCULAR; INTRAVENOUS EVERY 8 HOURS PRN
Status: CANCELLED | OUTPATIENT
Start: 2023-01-18

## 2023-01-18 RX ORDER — AMLODIPINE BESYLATE 5 MG/1
5 TABLET ORAL DAILY
Status: CANCELLED | OUTPATIENT
Start: 2023-01-19

## 2023-01-18 RX ORDER — GLUCAGON 1 MG
1 KIT INJECTION
Status: DISCONTINUED | OUTPATIENT
Start: 2023-01-18 | End: 2023-01-28 | Stop reason: HOSPADM

## 2023-01-18 RX ORDER — SODIUM CHLORIDE 0.9 % (FLUSH) 0.9 %
10 SYRINGE (ML) INJECTION
Status: CANCELLED | OUTPATIENT
Start: 2023-01-18

## 2023-01-18 RX ORDER — BRIMONIDINE TARTRATE 2 MG/ML
1 SOLUTION/ DROPS OPHTHALMIC EVERY 8 HOURS
Status: DISCONTINUED | OUTPATIENT
Start: 2023-01-18 | End: 2023-01-28 | Stop reason: HOSPADM

## 2023-01-18 RX ORDER — TAMSULOSIN HYDROCHLORIDE 0.4 MG/1
0.8 CAPSULE ORAL DAILY
Status: CANCELLED | OUTPATIENT
Start: 2023-01-19

## 2023-01-18 RX ORDER — TAMSULOSIN HYDROCHLORIDE 0.4 MG/1
0.8 CAPSULE ORAL DAILY
Status: DISCONTINUED | OUTPATIENT
Start: 2023-01-19 | End: 2023-01-28 | Stop reason: HOSPADM

## 2023-01-18 RX ORDER — TALC
6 POWDER (GRAM) TOPICAL NIGHTLY PRN
Status: DISCONTINUED | OUTPATIENT
Start: 2023-01-18 | End: 2023-01-18 | Stop reason: SDUPTHER

## 2023-01-18 RX ORDER — ADHESIVE BANDAGE
30 BANDAGE TOPICAL DAILY PRN
Status: CANCELLED | OUTPATIENT
Start: 2023-01-18

## 2023-01-18 RX ORDER — TALC
6 POWDER (GRAM) TOPICAL NIGHTLY PRN
Status: CANCELLED | OUTPATIENT
Start: 2023-01-18

## 2023-01-18 RX ORDER — MUPIROCIN 20 MG/G
OINTMENT TOPICAL 2 TIMES DAILY
Status: CANCELLED | OUTPATIENT
Start: 2023-01-18 | End: 2023-01-21

## 2023-01-18 RX ORDER — ACETAMINOPHEN 325 MG/1
650 TABLET ORAL EVERY 6 HOURS PRN
Status: CANCELLED | OUTPATIENT
Start: 2023-01-18

## 2023-01-18 RX ORDER — AMLODIPINE BESYLATE 5 MG/1
5 TABLET ORAL DAILY
Status: DISCONTINUED | OUTPATIENT
Start: 2023-01-19 | End: 2023-01-28 | Stop reason: HOSPADM

## 2023-01-18 RX ORDER — AMOXICILLIN 250 MG
1 CAPSULE ORAL 2 TIMES DAILY PRN
Status: CANCELLED | OUTPATIENT
Start: 2023-01-18

## 2023-01-18 RX ORDER — ENOXAPARIN SODIUM 100 MG/ML
30 INJECTION SUBCUTANEOUS EVERY 24 HOURS
Status: DISCONTINUED | OUTPATIENT
Start: 2023-01-18 | End: 2023-01-19

## 2023-01-18 RX ORDER — MUPIROCIN 20 MG/G
OINTMENT TOPICAL 2 TIMES DAILY
Status: ACTIVE | OUTPATIENT
Start: 2023-01-18 | End: 2023-01-21

## 2023-01-18 RX ORDER — SENNOSIDES 8.6 MG/1
8.6 TABLET ORAL DAILY PRN
Status: DISCONTINUED | OUTPATIENT
Start: 2023-01-18 | End: 2023-01-18 | Stop reason: SDUPTHER

## 2023-01-18 RX ORDER — SODIUM CHLORIDE 0.9 % (FLUSH) 0.9 %
10 SYRINGE (ML) INJECTION
Status: DISCONTINUED | OUTPATIENT
Start: 2023-01-18 | End: 2023-01-28 | Stop reason: HOSPADM

## 2023-01-18 RX ORDER — SULFAMETHOXAZOLE AND TRIMETHOPRIM 800; 160 MG/1; MG/1
1 TABLET ORAL 2 TIMES DAILY
Status: COMPLETED | OUTPATIENT
Start: 2023-01-18 | End: 2023-01-24

## 2023-01-18 RX ORDER — FINASTERIDE 5 MG/1
5 TABLET, FILM COATED ORAL DAILY
Status: DISCONTINUED | OUTPATIENT
Start: 2023-01-19 | End: 2023-01-28 | Stop reason: HOSPADM

## 2023-01-18 RX ORDER — INSULIN ASPART 100 [IU]/ML
0-5 INJECTION, SOLUTION INTRAVENOUS; SUBCUTANEOUS
Status: CANCELLED | OUTPATIENT
Start: 2023-01-18

## 2023-01-18 RX ORDER — ACETAMINOPHEN 325 MG/1
650 TABLET ORAL EVERY 6 HOURS PRN
Status: DISCONTINUED | OUTPATIENT
Start: 2023-01-18 | End: 2023-01-18 | Stop reason: SDUPTHER

## 2023-01-18 RX ORDER — MAG HYDROX/ALUMINUM HYD/SIMETH 200-200-20
30 SUSPENSION, ORAL (FINAL DOSE FORM) ORAL EVERY 6 HOURS PRN
Status: DISCONTINUED | OUTPATIENT
Start: 2023-01-18 | End: 2023-01-28 | Stop reason: HOSPADM

## 2023-01-18 RX ORDER — FINASTERIDE 5 MG/1
5 TABLET, FILM COATED ORAL DAILY
Status: CANCELLED | OUTPATIENT
Start: 2023-01-19

## 2023-01-18 RX ORDER — ENOXAPARIN SODIUM 100 MG/ML
30 INJECTION SUBCUTANEOUS EVERY 24 HOURS
Status: CANCELLED | OUTPATIENT
Start: 2023-01-18

## 2023-01-18 RX ORDER — DORZOLAMIDE HYDROCHLORIDE AND TIMOLOL MALEATE 20; 5 MG/ML; MG/ML
1 SOLUTION/ DROPS OPHTHALMIC 3 TIMES DAILY
Status: DISCONTINUED | OUTPATIENT
Start: 2023-01-18 | End: 2023-01-28 | Stop reason: HOSPADM

## 2023-01-18 RX ORDER — DORZOLAMIDE HYDROCHLORIDE AND TIMOLOL MALEATE 20; 5 MG/ML; MG/ML
1 SOLUTION/ DROPS OPHTHALMIC 3 TIMES DAILY
Status: CANCELLED | OUTPATIENT
Start: 2023-01-18

## 2023-01-18 RX ORDER — ADHESIVE BANDAGE
30 BANDAGE TOPICAL DAILY PRN
Status: DISCONTINUED | OUTPATIENT
Start: 2023-01-18 | End: 2023-01-28 | Stop reason: HOSPADM

## 2023-01-18 RX ORDER — CALCIUM CARBONATE 200(500)MG
500 TABLET,CHEWABLE ORAL 2 TIMES DAILY PRN
Status: CANCELLED | OUTPATIENT
Start: 2023-01-18

## 2023-01-18 RX ORDER — INSULIN ASPART 100 [IU]/ML
0-5 INJECTION, SOLUTION INTRAVENOUS; SUBCUTANEOUS
Status: DISCONTINUED | OUTPATIENT
Start: 2023-01-18 | End: 2023-01-18

## 2023-01-18 RX ORDER — ACETAMINOPHEN 325 MG/1
650 TABLET ORAL EVERY 4 HOURS PRN
Status: CANCELLED | OUTPATIENT
Start: 2023-01-18

## 2023-01-18 RX ORDER — ACETAMINOPHEN 325 MG/1
650 TABLET ORAL EVERY 4 HOURS PRN
Status: DISCONTINUED | OUTPATIENT
Start: 2023-01-18 | End: 2023-01-28 | Stop reason: HOSPADM

## 2023-01-18 RX ORDER — GABAPENTIN 100 MG/1
100 CAPSULE ORAL 2 TIMES DAILY
Status: CANCELLED | OUTPATIENT
Start: 2023-01-18

## 2023-01-18 RX ORDER — TALC
6 POWDER (GRAM) TOPICAL NIGHTLY PRN
Status: DISCONTINUED | OUTPATIENT
Start: 2023-01-18 | End: 2023-01-28 | Stop reason: HOSPADM

## 2023-01-18 RX ORDER — SULFAMETHOXAZOLE AND TRIMETHOPRIM 800; 160 MG/1; MG/1
1 TABLET ORAL 2 TIMES DAILY
Status: CANCELLED | OUTPATIENT
Start: 2023-01-18

## 2023-01-18 RX ORDER — BRIMONIDINE TARTRATE 2 MG/ML
1 SOLUTION/ DROPS OPHTHALMIC EVERY 8 HOURS
Status: CANCELLED | OUTPATIENT
Start: 2023-01-18

## 2023-01-18 RX ORDER — SENNOSIDES 8.6 MG/1
8.6 TABLET ORAL DAILY PRN
Status: CANCELLED | OUTPATIENT
Start: 2023-01-18

## 2023-01-18 RX ORDER — GABAPENTIN 100 MG/1
100 CAPSULE ORAL 2 TIMES DAILY
Status: DISCONTINUED | OUTPATIENT
Start: 2023-01-18 | End: 2023-01-28 | Stop reason: HOSPADM

## 2023-01-18 RX ORDER — AMOXICILLIN 250 MG
1 CAPSULE ORAL 2 TIMES DAILY PRN
Status: DISCONTINUED | OUTPATIENT
Start: 2023-01-18 | End: 2023-01-28 | Stop reason: HOSPADM

## 2023-01-18 RX ADMIN — GABAPENTIN 100 MG: 100 CAPSULE ORAL at 08:01

## 2023-01-18 RX ADMIN — SULFAMETHOXAZOLE AND TRIMETHOPRIM 1 TABLET: 800; 160 TABLET ORAL at 08:01

## 2023-01-18 RX ADMIN — MUPIROCIN: 20 OINTMENT TOPICAL at 08:01

## 2023-01-18 RX ADMIN — BRIMONIDINE TARTRATE 1 DROP: 2 SOLUTION/ DROPS OPHTHALMIC at 05:01

## 2023-01-18 RX ADMIN — AMLODIPINE BESYLATE 5 MG: 5 TABLET ORAL at 08:01

## 2023-01-18 RX ADMIN — DORZOLAMIDE HYDROCHLORIDE AND TIMOLOL MALEATE 1 DROP: 20; 5 SOLUTION/ DROPS OPHTHALMIC at 02:01

## 2023-01-18 RX ADMIN — DORZOLAMIDE HYDROCHLORIDE AND TIMOLOL MALEATE 1 DROP: 20; 5 SOLUTION/ DROPS OPHTHALMIC at 08:01

## 2023-01-18 RX ADMIN — BRIMONIDINE TARTRATE 1 DROP: 2 SOLUTION/ DROPS OPHTHALMIC at 09:01

## 2023-01-18 RX ADMIN — TAMSULOSIN HYDROCHLORIDE 0.8 MG: 0.4 CAPSULE ORAL at 08:01

## 2023-01-18 RX ADMIN — ENOXAPARIN SODIUM 30 MG: 100 INJECTION SUBCUTANEOUS at 04:01

## 2023-01-18 RX ADMIN — FINASTERIDE 5 MG: 5 TABLET, FILM COATED ORAL at 08:01

## 2023-01-18 RX ADMIN — BRIMONIDINE TARTRATE 1 DROP: 2 SOLUTION/ DROPS OPHTHALMIC at 02:01

## 2023-01-18 RX ADMIN — INSULIN ASPART 2 UNITS: 100 INJECTION, SOLUTION INTRAVENOUS; SUBCUTANEOUS at 10:01

## 2023-01-18 NOTE — DISCHARGE SUMMARY
[facility] [place]  Hospital Medicine  Discharge Summary      Patient Name: Jake Brown  MRN: [id number]  DARIO: [id number]  Patient Class: IP- Inpatient  Admission Date: 1/11/2023  Hospital Length of Stay: 5 days  Discharge Date and Time:  01/18/2023 11:35 AM  Attending Physician: Doron Godwin DO   Discharging Provider: Doron Godwin DO  Primary Care Provider: Osvaldo Hodges MD    Primary Care Team: Networked reference to record PCT     HPI:   Mr Roland is a 74 y/o male that presented to [facility] ED with fever, N/V/D x2 days. He was found to have Urosepsis and LIZANDRO. WBC 27.9. Lactate 2.7. Creatinine 4.9, BUN 69. This is up from recent labs on 1/9 with creatinine 3.10. CT abdomen pelvis showed layering densities in gallbladder likely small stones. Amylase/Lipase WNL. Fever up to 102.8, improved to 101 with Tylenol. HR up to 122, improved with IV fluids. He had 2 episodes of hypotension while in ED but resolved with IV fluids. He exhibited no abd tenderness and Greenberg's sign negative. given Zofran and nausea subsided. No vomiting while in ED.     Code Status: FULL CODE    1/14/23; Patient more alert and awake today T-max last night 99.0 patient afebrile after 12 midnight.  Urine culture grew out Klebsiella pneumoniae, it is resistant to Rocephin.  We will continue antibiotics as prescribed, we will give 1 more L of normal saline at 50 cc an hour to help correct BUN of 49 and creatinine 2.21.      * No surgery found *      Hospital Course:   1/13 He seems better, labs slowly improving.  Still has mederos.  Will try to get it out today.  Continue IV ABX and gentle fluids.  Still may need additional time for treatment. Increased Flomax.     1/13 Called to patient's room by nursing staff due to acute AMS. Unable to perform neuro exam due to lack of patient cooperation. CT of head ordered and performed with no acute pathology. Nurse noted patient hypotensive. Fluid bolus ordered. Patient BP  improved. AMS improved. Will continue to monitor.    1/15 Mr Brown is awake, oriented. He reports he feels a little better today.  He is still receiving IV rocephin and is slowly improving.  Will continue IV abx and gentle hydration    1/16 called by RAMSEY Mederos RN that Mr Brown only voiding 10 ml at a time and reporting fullness - ordered reinsertion of mederos for urinary retention    1/16 Had to get mederos placed back, got 1200cc of urine.  He feels better. BP increasing will add back home Norvasc.  More alert today. Urine culture K. Pneumonia.  S to Rocephin. Will change to PO ABX today.      1/17 Seems to be doing well, still has Mederos catheter.  Monitoring labs.  PT eval done.  Awaiting SWB approval for therapy and mederos removal if possible.      1/18 DC to SWB today, continue with therapy        Goals of Care Treatment Preferences:  Code Status: Full Code      Consults:     No new Assessment & Plan notes have been filed under this hospital service since the last note was generated.  Service: Hospital Medicine    Final Active Diagnoses:    Diagnosis Date Noted POA    Urinary retention due to benign prostatic hyperplasia [N40.1, R33.8] 01/12/2023 Yes    Benign prostatic hyperplasia without lower urinary tract symptoms [N40.0] 03/01/2022 Yes    Hyperthyroidism [E05.90] 08/24/2021 Yes    CHF (congestive heart failure) [I50.9] 06/09/2021 Yes    CKD (chronic kidney disease) [N18.9] 06/09/2021 Yes    Diabetes [E11.9]  Yes    Essential hypertension [I10]  Yes      Problems Resolved During this Admission:    Diagnosis Date Noted Date Resolved POA    PRINCIPAL PROBLEM:  Acute on chronic renal failure [N17.9, N18.9] 01/12/2023 01/18/2023 Yes    Acute cystitis without hematuria [N30.00] 01/12/2023 01/18/2023 Yes    Decreased appetite [R63.0] 06/09/2021 01/18/2023 Yes       Discharged Condition: good    Disposition: Rehab Facility    Follow Up:    Patient Instructions:   No discharge procedures on file.    Significant  Diagnostic Studies: Labs:   BMP:   Recent Labs   Lab 01/17/23  0531 01/18/23  0507   * 153*    136   K 4.1 4.2    103   CO2 26 28   BUN 34* 31*   CREATININE 1.76* 1.84*   CALCIUM 8.5 8.2*       Pending Diagnostic Studies:     None         Medications:  Transfer Medications (for Discharge Readmit only):   Current Facility-Administered Medications   Medication Dose Route Frequency Provider Last Rate Last Admin    acetaminophen tablet 650 mg  650 mg Oral Q4H PRN SHIMA Mcnair   650 mg at 01/14/23 1153    aluminum-magnesium hydroxide-simethicone 200-200-20 mg/5 mL suspension 30 mL  30 mL Oral Q6H PRN SHIMA Mcnair   30 mL at 01/15/23 1731    amLODIPine tablet 5 mg  5 mg Oral Daily Doron Godwin DO   5 mg at 01/18/23 0846    brimonidine 0.2% ophthalmic solution 1 drop  1 drop Both Eyes Q8H SHIMA Mcnair   1 drop at 01/18/23 0550    dextrose 50% injection 12.5 g  12.5 g Intravenous PRN SHIMA Mcnair        dorzolamide-timolol 2-0.5% ophthalmic solution 1 drop  1 drop Both Eyes TID SHIMA Mcnair   1 drop at 01/18/23 0849    enoxaparin injection 30 mg  30 mg Subcutaneous Daily SHIMA Mcnair   30 mg at 01/17/23 1703    finasteride tablet 5 mg  5 mg Oral Daily Doron Godwin DO   5 mg at 01/18/23 0846    gabapentin capsule 100 mg  100 mg Oral BID Doron Godwin DO   100 mg at 01/18/23 0846    glucagon (human recombinant) injection 1 mg  1 mg Intramuscular PRN SHIMA Mcnair        insulin aspart U-100 injection 0-5 Units  0-5 Units Subcutaneous QID (AC & HS) SHIMA Cannon   2 Units at 01/18/23 1055    magnesium hydroxide 400 mg/5 ml suspension 2,400 mg  30 mL Oral Daily PRN SHIMA Mcnair        melatonin tablet 6 mg  6 mg Oral Nightly PRN SHIMA Mcnair        mupirocin 2 % ointment   Nasal BID Doron Godwin DO   Given at 01/18/23 0846    ondansetron injection 4 mg  4 mg Intravenous Q8H PRN SHIMA Mcnair         senna tablet 8.6 mg  8.6 mg Oral Daily PRN SHIMA Mcnair        sodium chloride 0.9% flush 10 mL  10 mL Intravenous PRN SHIAM Mcnair        sulfamethoxazole-trimethoprim 800-160mg per tablet 1 tablet  1 tablet Oral BID Doron Godwin DO   1 tablet at 01/18/23 0846    tamsulosin 24 hr capsule 0.8 mg  0.8 mg Oral Daily Doron Godwin DO   0.8 mg at 01/18/23 0846       Indwelling Lines/Drains at time of discharge:   Lines/Drains/Airways     Drain  Duration                Urethral Catheter 01/16/23 0500 Silicone 16 Fr. 2 days                Time spent on the discharge of patient: 35 minutes         Doron Godwin DO  Department of Hospital Medicine  [facility]

## 2023-01-18 NOTE — PROGRESS NOTES
Pt declined PT eval for Swing Bed admission asking if he could be eval'd tomorrow. States he was very fatigued. Will try again tomorrow.

## 2023-01-18 NOTE — PLAN OF CARE
Problem: Occupational Therapy  Goal: Occupational Therapy Goal  Description: STG: within 1 week    Pt will increase UB strength to WFL for functional tasks including using AD safely and effectively  Pt will improve endurance to good for functional tasks including self care  Pt will tolerate 30 minutes of tx without fatigue      LT.Restore to max I with self care and mobility.     Outcome: Ongoing, Progressing

## 2023-01-18 NOTE — PLAN OF CARE
01/18/23 1503   Discharge Assessment   Assessment Type Discharge Planning Assessment   Confirmed/corrected address, phone number and insurance Yes   Source of Information patient   Communicated KARINA with patient/caregiver Date not available/Unable to determine   Reason For Admission pt/ot   People in Home other relative(s)   Do you expect to return to your current living situation? Yes   Do you have help at home or someone to help you manage your care at home? Yes   Who are your caregiver(s) and their phone number(s)? sister assists with his care.   Prior to hospitilization cognitive status: Alert/Oriented   Current cognitive status: Alert/Oriented   Home Layout Able to live on 1st floor   Equipment Currently Used at Home none   Readmission within 30 days? No   Patient currently being followed by outpatient case management? No   Do you take prescription medications? Yes   Do you have prescription coverage? Yes   Do you have any problems affording any of your prescribed medications? No   Is the patient taking medications as prescribed? yes   Who is going to help you get home at discharge? sister and other family memebers assist with care as needed but patient states he is very self sufficient   How do you get to doctors appointments? family or friend will provide   Are you on dialysis? No   Do you take coumadin? No   Discharge Plan A Home with family;Home Health   DME Needed Upon Discharge  other (see comments)  (TBD)   Discharge Plan discussed with: Patient   Discharge Barriers Identified None   Physical Activity   On average, how many days per week do you engage in moderate to strenuous exercise (like a brisk walk)? 0 days   On average, how many minutes do you engage in exercise at this level? 0 min   Financial Resource Strain   How hard is it for you to pay for the very basics like food, housing, medical care, and heating? Not very   Housing Stability   In the last 12 months, was there a time when you were not  able to pay the mortgage or rent on time? Y   In the last 12 months, how many places have you lived? 1   In the last 12 months, was there a time when you did not have a steady place to sleep or slept in a shelter (including now)? N   Transportation Needs   In the past 12 months, has lack of transportation kept you from medical appointments or from getting medications? no   In the past 12 months, has lack of transportation kept you from meetings, work, or from getting things needed for daily living? No   Food Insecurity   Within the past 12 months, you worried that your food would run out before you got the money to buy more. Never true   Within the past 12 months, the food you bought just didn't last and you didn't have money to get more. Never true   Stress   Do you feel stress - tense, restless, nervous, or anxious, or unable to sleep at night because your mind is troubled all the time - these days? Only a littl   Social Connections   In a typical week, how many times do you talk on the phone with family, friends, or neighbors? Never   How often do you get together with friends or relatives? Never   How often do you attend Sikhism or Baptism services? Never   Do you belong to any clubs or organizations such as Sikhism groups, unions, fraternal or athletic groups, or school groups? No   How often do you attend meetings of the clubs or organizations you belong to? 1 to 4   Are you , , , , never , or living with a partner?    Alcohol Use   Q1: How often do you have a drink containing alcohol? Never   Q2: How many drinks containing alcohol do you have on a typical day when you are drinking? None   Q3: How often do you have six or more drinks on one occasion? Never     Patient admitted to [facility] for inpatient PT/OT. Patient reports he is independent with daily activities and is 'pretty self sufficient'. He is followed by Dr. Hodges. Pharmacy- [facility] in [place]. Will monitor  discharge needs throughout stay.

## 2023-01-18 NOTE — PLAN OF CARE
Problem: Adult Inpatient Plan of Care  Goal: Plan of Care Review  Outcome: Ongoing, Progressing   Patient will be able to ambulate alone by the end of 21 days.

## 2023-01-18 NOTE — PT/OT/SLP EVAL
Occupational Therapy   Evaluation    Name: Jake Brown  MRN: [id number]  Admitting Diagnosis: sepsis due to UTI  Recent Surgery: * No surgery found *      Recommendations:     Discharge Recommendations: home health OT, home with home health  Discharge Equipment Recommendations:  walker, rolling  Barriers to discharge:  None    Assessment:     Jake Brown is a 73 y.o. male with a medical diagnosis of sepsis due to UTI.  He presents with agreeableness to evaluation at bedside. Performance deficits affecting function: weakness, impaired endurance, gait instability, impaired balance.      Rehab Prognosis: Good and for short stay to include HOME EXERCISE PROGRAM instruction ; patient would benefit from acute skilled OT services to address these deficits and reach maximum level of function.       Plan:     Patient to be seen 5 x/week to address the above listed problems via self-care/home management, therapeutic activities, therapeutic exercises  Plan of Care Expires: 01/27/23  Plan of Care Reviewed with: patient    Subjective     Chief Complaint: needs mederos for urinary retention, a little slow with my walking  Patient/Family Comments/goals: get [place] ([place])    Occupational Profile:  Living Environment: home with family (nephew)  Previous level of function: mod I to independent around home, denied recent falls  Roles and Routines: home with family, retired  Equipment Used at Home: none  Assistance upon Discharge: family svn, assist with community mobility including driving    Pain/Comfort:  Pain Rating 1: 0/10    Patients cultural, spiritual, Scientologist conflicts given the current situation: no    Objective:     Communicated with: pt prior to session.  Patient found up in chair with   upon OT entry to room.    General Precautions: Standard, fall, diabetic  Orthopedic Precautions:    Braces: N/A  Respiratory Status: Room air    Occupational Performance:    Bed Mobility:    Mod I to independent    Functional  Mobility/Transfers:  Patient completed Sit <> Stand Transfer with stand by assistance  with  rolling walker   Functional Mobility: got to bathroom toilet with RW and SBA    Activities of Daily Living:  Feeding:  independence no need of setup  Grooming: independence and after setup    Bathing: independence after setup  Upper Body Dressing: setup only  Lower Body Dressing: independence after setup  Toileting: modified independence uses RW to get to bathroom, uses grab bar as needed for safety    Cognitive/Visual Perceptual:  Cognitive/Psychosocial Skills:     -       Oriented to: Person, Place, Time, and Situation   -       Follows Commands/attention:Follows multistep  commands  -       Communication: clear/fluent  -       Memory: No Deficits noted  -       Safety awareness/insight to disability: intact   -       Mood/Affect/Coping skills/emotional control: Appropriate to situation, Cooperative, and Pleasant    Physical Exam:  Balance:    -       sitting good in all aspects; standing static good, dynamic standing, occasional LOB with need of CGA, can correct self but slow speed  Dominant hand:    -       right  Upper Extremity Range of Motion:     -       Right Upper Extremity: WFL  -       Left Upper Extremity: WFL  Upper Extremity Strength:    -       Right Upper Extremity: -4/5  -       Left Upper Extremity: -4/5   Strength:    -       Right Upper Extremity: -4/5  -       Left Upper Extremity: -4/5  Fine Motor Coordination:    -       Intact  Gross motor coordination:   WFL    AMPAC 6 Click ADL:  AMPAC Total Score: 19    Treatment & Education:  Eval completed today    Patient left up in chair with call button in reach    GOALS:   Multidisciplinary Problems       Occupational Therapy Goals          Problem: Occupational Therapy    Goal Priority Disciplines Outcome Interventions   Occupational Therapy Goal     OT, PT/OT Ongoing, Progressing    Description: STG:    Pt will increase UB strength to WFL for  functional tasks including using AD safely and effectively  Pt will improve endurance to good for functional tasks including self care  Pt will tolerate 30 minutes of tx without fatigue      LT.Restore to max I with self care and mobility.                          History:     Past Medical History:   Diagnosis Date    CHF (congestive heart failure)     Diabetes     Hypertension        History reviewed. No pertinent surgical history.    Time Tracking:     OT Date of Treatment: 23  OT Start Time: 1320  OT Stop Time: 1335  OT Total Time (min): 15 min    Billable Minutes:Evaluation 15    2023

## 2023-01-18 NOTE — PT/OT/SLP PROGRESS
Physical Therapy Treatment    Patient Name:  Jake Brown   MRN:  [id number]    Recommendations:     Discharge Recommendations: home with home health  Discharge Equipment Recommendations: none  Barriers to discharge: Inaccessible home    Assessment:     Jake Brown is a 73 y.o. male admitted with a medical diagnosis of Acute on chronic renal failure.  He presents with the following impairments/functional limitations: weakness, impaired endurance, gait instability, impaired balance. Patient able to increase gait distance this date, and required less assistance with transfers. Patient with no reports of adverse effects throughout treatment session.     Rehab Prognosis: Good; patient would benefit from acute skilled PT services to address these deficits and reach maximum level of function.    Recent Surgery: * No surgery found *      Plan:     During this hospitalization, patient to be seen 5 x/week to address the identified rehab impairments via gait training, therapeutic activities, therapeutic exercises and progress toward the following goals:    Plan of Care Expires:  02/03/23    Subjective     Chief Complaint: Patient with no complaints.   Patient/Family Comments/goals: PT recommends pt be transferred to Swing Bed Unit.  Pain/Comfort:         Objective:     Communicated with supervising PT prior to session.  Patient found HOB elevated with   upon PTA entry to room.     General Precautions: Standard, fall, diabetic  Orthopedic Precautions:    Braces:    Respiratory Status: Room air     Functional Mobility:  Bed Mobility:     Rolling Left:  stand by assistance  Supine to Sit: stand by assistance and contact guard assistance  Transfers:     Sit to Stand:  contact guard assistance with rolling walker  Bed to Chair: contact guard assistance with  rolling walker  using  Stand Pivot  Gait: Pt ambulated 165 feet with RW, CGA, and wc trail.      AM-PAC 6 CLICK MOBILITY          Treatment & Education:  Patient performed the  following therapeutic exercises BLE: ankle pumps 20x, SLRs 10x, bridging 10x, hip ABD/ADD with knee extended 10x, stading heel raises 10x, standing marching 10x    Patient left up in chair with call button in reach.    GOALS:   Multidisciplinary Problems       Physical Therapy Goals          Problem: Physical Therapy    Goal Priority Disciplines Outcome Goal Variances Interventions   Physical Therapy Goal     PT, PT/OT Ongoing, Progressing     Description: LTG 3 weeks    1. Pt will have 4/5 BLE strength.  2. Pt svn with STS and bed transfers.  3. Pt SBA to negotiated stairs with rail.  4. Pt will amb with appropriate AD x 100 ft with SBA.  5. Pt svn with bed mob.                       Time Tracking:     PT Received On: 01/17/23  PT Start Time: 1041     PT Stop Time: 1118  PT Total Time (min): 37 min     Billable Minutes: Gait Training 8, Therapeutic Activity 4, and Therapeutic Exercise 25    Treatment Type: Treatment  PT/PTA: PTA     PTA Visit Number: 1     01/18/2023

## 2023-01-19 PROBLEM — D62 ACUTE ON CHRONIC BLOOD LOSS ANEMIA: Status: ACTIVE | Noted: 2023-01-19

## 2023-01-19 LAB
ANION GAP SERPL CALCULATED.3IONS-SCNC: 12 MMOL/L (ref 7–16)
BASOPHILS # BLD AUTO: 0.02 K/UL (ref 0–0.2)
BASOPHILS NFR BLD AUTO: 0.3 % (ref 0–1)
BUN SERPL-MCNC: 31 MG/DL (ref 7–18)
BUN/CREAT SERPL: 16 (ref 6–20)
CALCIUM SERPL-MCNC: 8.1 MG/DL (ref 8.5–10.1)
CHLORIDE SERPL-SCNC: 104 MMOL/L (ref 98–107)
CO2 SERPL-SCNC: 25 MMOL/L (ref 21–32)
CREAT SERPL-MCNC: 1.94 MG/DL (ref 0.7–1.3)
DIFFERENTIAL METHOD BLD: ABNORMAL
EGFR (NO RACE VARIABLE) (RUSH/TITUS): 36 ML/MIN/1.73M²
EOSINOPHIL # BLD AUTO: 0.09 K/UL (ref 0–0.5)
EOSINOPHIL NFR BLD AUTO: 1.2 % (ref 1–4)
ERYTHROCYTE [DISTWIDTH] IN BLOOD BY AUTOMATED COUNT: 11.6 % (ref 11.5–14.5)
GLUCOSE SERPL-MCNC: 144 MG/DL (ref 74–106)
GLUCOSE SERPL-MCNC: 148 MG/DL (ref 70–105)
GLUCOSE SERPL-MCNC: 160 MG/DL (ref 70–105)
GLUCOSE SERPL-MCNC: 206 MG/DL (ref 70–105)
GLUCOSE SERPL-MCNC: 295 MG/DL (ref 70–105)
HCT VFR BLD AUTO: 21.6 % (ref 40–54)
HGB BLD-MCNC: 7.4 G/DL (ref 13.5–18)
LYMPHOCYTES # BLD AUTO: 1.69 K/UL (ref 1–4.8)
LYMPHOCYTES NFR BLD AUTO: 22.2 % (ref 27–41)
MCH RBC QN AUTO: 31.2 PG (ref 27–31)
MCHC RBC AUTO-ENTMCNC: 34.3 G/DL (ref 32–36)
MCV RBC AUTO: 91.1 FL (ref 80–96)
MONOCYTES # BLD AUTO: 0.55 K/UL (ref 0–0.8)
MONOCYTES NFR BLD AUTO: 7.2 % (ref 2–6)
MPC BLD CALC-MCNC: 9.7 FL (ref 9.4–12.4)
NEUTROPHILS # BLD AUTO: 5.27 K/UL (ref 1.8–7.7)
NEUTROPHILS NFR BLD AUTO: 69.1 % (ref 53–65)
OCCULT BLOOD: NEGATIVE
PLATELET # BLD AUTO: 248 K/UL (ref 150–400)
POTASSIUM SERPL-SCNC: 4.5 MMOL/L (ref 3.5–5.1)
RBC # BLD AUTO: 2.37 M/UL (ref 4.6–6.2)
SODIUM SERPL-SCNC: 136 MMOL/L (ref 136–145)
WBC # BLD AUTO: 7.62 K/UL (ref 4.5–11)

## 2023-01-19 PROCEDURE — 99305 1ST NF CARE MODERATE MDM 35: CPT | Mod: ,,, | Performed by: HOSPITALIST

## 2023-01-19 PROCEDURE — 63600175 PHARM REV CODE 636 W HCPCS: Performed by: HOSPITALIST

## 2023-01-19 PROCEDURE — 80048 BASIC METABOLIC PNL TOTAL CA: CPT | Performed by: HOSPITALIST

## 2023-01-19 PROCEDURE — 85025 COMPLETE CBC W/AUTO DIFF WBC: CPT | Performed by: HOSPITALIST

## 2023-01-19 PROCEDURE — 99305 PR NURSING FACILITY CARE, INIT, MOD SEVERITY: ICD-10-PCS | Mod: ,,, | Performed by: HOSPITALIST

## 2023-01-19 PROCEDURE — 82272 OCCULT BLD FECES 1-3 TESTS: CPT | Performed by: HOSPITALIST

## 2023-01-19 PROCEDURE — 97110 THERAPEUTIC EXERCISES: CPT

## 2023-01-19 PROCEDURE — 11000004 HC SNF PRIVATE

## 2023-01-19 PROCEDURE — 97163 PT EVAL HIGH COMPLEX 45 MIN: CPT

## 2023-01-19 PROCEDURE — 97530 THERAPEUTIC ACTIVITIES: CPT

## 2023-01-19 PROCEDURE — 27000958

## 2023-01-19 PROCEDURE — 25000003 PHARM REV CODE 250: Performed by: HOSPITALIST

## 2023-01-19 PROCEDURE — 82962 GLUCOSE BLOOD TEST: CPT

## 2023-01-19 RX ADMIN — DORZOLAMIDE HYDROCHLORIDE AND TIMOLOL MALEATE 1 DROP: 20; 5 SOLUTION/ DROPS OPHTHALMIC at 02:01

## 2023-01-19 RX ADMIN — FINASTERIDE 5 MG: 5 TABLET, FILM COATED ORAL at 09:01

## 2023-01-19 RX ADMIN — BRIMONIDINE TARTRATE 1 DROP: 2 SOLUTION/ DROPS OPHTHALMIC at 02:01

## 2023-01-19 RX ADMIN — GABAPENTIN 100 MG: 100 CAPSULE ORAL at 09:01

## 2023-01-19 RX ADMIN — INSULIN ASPART 1 UNITS: 100 INJECTION, SOLUTION INTRAVENOUS; SUBCUTANEOUS at 08:01

## 2023-01-19 RX ADMIN — MUPIROCIN: 20 OINTMENT TOPICAL at 08:01

## 2023-01-19 RX ADMIN — SULFAMETHOXAZOLE AND TRIMETHOPRIM 1 TABLET: 800; 160 TABLET ORAL at 09:01

## 2023-01-19 RX ADMIN — AMLODIPINE BESYLATE 5 MG: 5 TABLET ORAL at 09:01

## 2023-01-19 RX ADMIN — DORZOLAMIDE HYDROCHLORIDE AND TIMOLOL MALEATE 1 DROP: 20; 5 SOLUTION/ DROPS OPHTHALMIC at 09:01

## 2023-01-19 RX ADMIN — TAMSULOSIN HYDROCHLORIDE 0.8 MG: 0.4 CAPSULE ORAL at 09:01

## 2023-01-19 RX ADMIN — GABAPENTIN 100 MG: 100 CAPSULE ORAL at 08:01

## 2023-01-19 RX ADMIN — BRIMONIDINE TARTRATE 1 DROP: 2 SOLUTION/ DROPS OPHTHALMIC at 05:01

## 2023-01-19 RX ADMIN — DORZOLAMIDE HYDROCHLORIDE AND TIMOLOL MALEATE 1 DROP: 20; 5 SOLUTION/ DROPS OPHTHALMIC at 08:01

## 2023-01-19 RX ADMIN — INSULIN ASPART 2 UNITS: 100 INJECTION, SOLUTION INTRAVENOUS; SUBCUTANEOUS at 11:01

## 2023-01-19 RX ADMIN — BRIMONIDINE TARTRATE 1 DROP: 2 SOLUTION/ DROPS OPHTHALMIC at 09:01

## 2023-01-19 RX ADMIN — MUPIROCIN: 20 OINTMENT TOPICAL at 09:01

## 2023-01-19 RX ADMIN — SULFAMETHOXAZOLE AND TRIMETHOPRIM 1 TABLET: 800; 160 TABLET ORAL at 08:01

## 2023-01-19 NOTE — PT/OT/SLP EVAL
Physical Therapy Evaluation    Patient Name:  Jake Brown   MRN:  [id number]    Recommendations:     Discharge Recommendations: home   Discharge Equipment Recommendations: none   Barriers to discharge: Inaccessible home    Assessment:     Jake Brown is a 73 y.o. male admitted with a medical diagnosis of Weakness.  He presents with the following impairments/functional limitations: impaired balance, weakness, impaired functional mobility . Pt is re-evaluated due to having transferred to Swing Bed unit.    Rehab Prognosis: Good; patient would benefit from acute skilled PT services to address these deficits and reach maximum level of function.    Recent Surgery: * No surgery found *      Plan:     During this hospitalization, patient to be seen 5 x/week to address the identified rehab impairments via gait training, therapeutic activities, therapeutic exercises and progress toward the following goals:    Plan of Care Expires:  23    Subjective     Chief Complaint: weakness, decreased balance  Patient/Family Comments/goals: pt to dc home upon dc from hosp  Pain/Comfort:  Pain Rating 1: 0/10    Patients cultural, spiritual, Pentecostal conflicts given the current situation: no    Living Environment:  Pt lives with his nephew. He has 5 step to enter home with 2 rails. Nephew is not employed and can assist pt upon dc home. Pt also has family nearby to assist him.  Prior to admission, patients level of function was patients level of function was indep without AD in home and community; was driving.  Equipment used at home: none (Pt has rollator, MWC, shower chair from his  brother).  DME owned (not currently used):  rollator, SC, shower chair, MWC .  Upon discharge, patient will have assistance from family.    Objective:     Communicated with pt prior to session.  Patient found up in chair with mederos catheter  upon PT entry to room.    General Precautions: Standard, fall  Orthopedic Precautions:    Braces:     Respiratory Status: Room air    Exams:  RLE ROM: WNL  RLE Strength: Deficits: grossly 4-/5  LLE ROM: WNL  LLE Strength: Deficits: grossly 4-/5    Functional Mobility:  Bed Mobility:     Rolling Left:  stand by assistance  Rolling Right: stand by assistance  Scooting: stand by assistance  Supine to Sit: stand by assistance  Sit to Supine: stand by assistance  Transfers:     Sit to Stand:  stand by assistance with rolling walker  Bed to Chair: stand by assistance with  rolling walker  using  Stand Pivot  Gait: Pt amb'd with RW with SBA x 250 ft.      AM-PAC 6 CLICK MOBILITY  Total Score:18       Treatment & Education:  Eval performed. Pt used Nu-Step at Level 2.0 x 10 min using BUE/BLE.    Patient left up in chair with call button in reach.    GOALS:   Multidisciplinary Problems       Physical Therapy Goals          Problem: Physical Therapy    Goal Priority Disciplines Outcome Goal Variances Interventions   Physical Therapy Goal     PT, PT/OT Ongoing, Progressing     Description: LTG - 2 weeks  1. Pt will have 4/5 BLE strength.  2. Pt  with STS and bed transfers.  3. Pt SBA to negotiated stairs with rail.  4. Pt will amb with appropriate AD x 250 ft with svn level surfaces and SBA unlevel surfaces.  5. Pt indep with bed mob.                       History:     Past Medical History:   Diagnosis Date    CHF (congestive heart failure)     Diabetes     Hypertension        History reviewed. No pertinent surgical history.    Time Tracking:     PT Received On: 01/19/23  PT Start Time: 1446     PT Stop Time: 1515  PT Total Time (min): 29 min     Billable Minutes: Evaluation 19, Therapeutic Exercise 10, and Total Time 29 min      01/19/2023

## 2023-01-19 NOTE — ASSESSMENT & PLAN NOTE
Continue current meds.  Needs Urology referral.  Will attempt to DC mederos one more time during stay but if he fails that will need to keep until seen by Urology.

## 2023-01-19 NOTE — HPI
72yo BM with hx of CKD, HTN, DMII, admit to [facility]B s/p Sepsis and renal failure 2nd to UTI and dehydration.  He has had a mederos now due to retention.  Does not see a Urologist.  He is followed by Nephrologist.  He was evaluated and felt like he would benefit from SWB therapy.  H/H has trended down he denies any blood in stool.  No fevers or chills.  Doing well and participating.

## 2023-01-19 NOTE — PT/OT/SLP PROGRESS
Occupational Therapy   Treatment    Name: Jake Brown  MRN: [id number]  Admitting Diagnosis:  Weakness       Recommendations:     Discharge Recommendations: home health OT, home with home health  Discharge Equipment Recommendations:  walker, rolling  Barriers to discharge:  None    Assessment:     Jake Brown is a 73 y.o. male with a medical diagnosis of Weakness.  He presents with agreeable to therapy with OT in OT gym. Performance deficits affecting function are weakness, impaired endurance, gait instability, impaired balance.     Rehab Prognosis:  Good; patient would benefit from acute skilled OT services to address these deficits and reach maximum level of function.       Plan:     Patient to be seen 5 x/week to address the above listed problems via self-care/home management, therapeutic activities, therapeutic exercises  Plan of Care Expires: 01/27/23  Plan of Care Reviewed with: patient    Subjective     Pain/Comfort:       Objective:     Communicated with: pt prior to session.  Patient found up in chair with mederos catheter   upon OT entry to room.    General Precautions: Standard, fall, diabetic    Orthopedic Precautions:   Braces: N/A  Respiratory Status: Room air     Occupational Performance:     Bed Mobility:    na    Functional Mobility/Transfers:  Na today    Activities of Daily Living:      [facility] 6 Click ADL:      Treatment & Education:  OT engaged pt in multiple UB exercises to promote AROM, reach, strength of BUE including UBE x 10 min at level 2 resist, b pulleys x 5 min, weighted ball exercises 0.5kg 15 reps each direction sh flexion, ch press, horiz abd/add, then clothespin activity with 1# weights placing pins up with right hand, taking down with left.  Pt tolerated all very well.      Patient left up in chair with call button in reach, CNA notified, and fresh cup of coffee in hand.      GOALS:   Multidisciplinary Problems       Occupational Therapy Goals          Problem: Occupational Therapy     Goal Priority Disciplines Outcome Interventions   Occupational Therapy Goal     OT, PT/OT Ongoing, Progressing    Description: STG:    Pt will increase UB strength to WFL for functional tasks including using AD safely and effectively  Pt will improve endurance to good for functional tasks including self care  Pt will tolerate 30 minutes of tx without fatigue      LT.Restore to max I with self care and mobility.                          Time Tracking:     OT Date of Treatment: 23  OT Start Time: 1342  OT Stop Time: 1435  OT Total Time (min): 53 min    Billable Minutes:Therapeutic Activity 23  Therapeutic Exercise 30    OT/ALINA: OT          2023

## 2023-01-19 NOTE — PLAN OF CARE
Problem: Physical Therapy  Goal: Physical Therapy Goal  Description: LTG - 2 weeks  1. Pt will have 4/5 BLE strength.  2. Pt  with STS and bed transfers.  3. Pt SBA to negotiated stairs with rail.  4. Pt will amb with appropriate AD x 250 ft with svn level surfaces and SBA unlevel surfaces.  5. Pt indep with bed mob.  1/19/2023 1614 by Isabella Stratton, PT  Outcome: Ongoing, Progressing  1/19/2023 1610 by Isabella Stratton, PT  Outcome: Ongoing, Progressing

## 2023-01-19 NOTE — ASSESSMENT & PLAN NOTE
Patient's FSGs are controlled on current medication regimen.  Last A1c reviewed-   Lab Results   Component Value Date    HGBA1C 6.2 01/11/2023     Most recent fingerstick glucose reviewed- No results for input(s): POCTGLUCOSE in the last 24 hours.  Current correctional scale  Low  Maintain anti-hyperglycemic dose as follows-   Antihyperglycemics (From admission, onward)    Start     Stop Route Frequency Ordered    01/18/23 1800  insulin aspart U-100 injection 0-5 Units         -- SubQ Before meals & nightly PRN 01/18/23 1657        Hold Oral hypoglycemics while patient is in the hospital.

## 2023-01-19 NOTE — PLAN OF CARE
Problem: Physical Therapy  Goal: Physical Therapy Goal  Description: LTG - 2 weeks  1. Pt will have 4/5 BLE strength.  2. Pt  svn with STS and bed transfers.  3. Pt SBA to negotiated stairs with rail.  4. Pt will amb with appropriate AD x 250 ft with svn level surfaces and SBA unlevel surfaces.  5. Pt indep with bed mob.  Outcome: Ongoing, Progressing

## 2023-01-19 NOTE — H&P
[facility] Medicine  History & Physical    Patient Name: Jake Brown  MRN: [id number]  Patient Class: IP- Swing  Admission Date: 1/18/2023  Attending Physician: Doron Godwin DO   Primary Care Provider: Osvaldo Hodges MD         Patient information was obtained from patient, past medical records and ER records.     Subjective:     Principal Problem:Weakness    Chief Complaint: No chief complaint on file.       HPI: 72yo BM with hx of CKD, HTN, DMII, admit to SWB s/p Sepsis and renal failure 2nd to UTI and dehydration.  He has had a mederos now due to retention.  Does not see a Urologist.  He is followed by Nephrologist.  He was evaluated and felt like he would benefit from SWB therapy.  H/H has trended down he denies any blood in stool.  No fevers or chills.  Doing well and participating.       Past Medical History:   Diagnosis Date    CHF (congestive heart failure)     Diabetes     Hypertension        History reviewed. No pertinent surgical history.    Review of patient's allergies indicates:  No Known Allergies    Current Facility-Administered Medications on File Prior to Encounter   Medication    [DISCONTINUED] acetaminophen tablet 650 mg    [DISCONTINUED] aluminum-magnesium hydroxide-simethicone 200-200-20 mg/5 mL suspension 30 mL    [DISCONTINUED] amLODIPine tablet 5 mg    [DISCONTINUED] brimonidine 0.2% ophthalmic solution 1 drop    [DISCONTINUED] dextrose 50% injection 12.5 g    [DISCONTINUED] dorzolamide-timolol 2-0.5% ophthalmic solution 1 drop    [DISCONTINUED] enoxaparin injection 30 mg    [DISCONTINUED] finasteride tablet 5 mg    [DISCONTINUED] gabapentin capsule 100 mg    [DISCONTINUED] glucagon (human recombinant) injection 1 mg    [DISCONTINUED] insulin aspart U-100 injection 0-5 Units    [DISCONTINUED] magnesium hydroxide 400 mg/5 ml suspension 2,400 mg    [DISCONTINUED] melatonin tablet 6 mg    [DISCONTINUED] mupirocin 2 % ointment     "[DISCONTINUED] ondansetron injection 4 mg    [DISCONTINUED] senna tablet 8.6 mg    [DISCONTINUED] sodium chloride 0.9% flush 10 mL    [DISCONTINUED] sulfamethoxazole-trimethoprim 800-160mg per tablet 1 tablet    [DISCONTINUED] tamsulosin 24 hr capsule 0.8 mg     Current Outpatient Medications on File Prior to Encounter   Medication Sig    allopurinoL (ZYLOPRIM) 100 MG tablet Take 1 tablet (100 mg total) by mouth once daily.    amlodipine-benazepril 5-20 mg (LOTREL) 5-20 mg per capsule Take 1 capsule by mouth once daily.    brimonidine 0.2% (ALPHAGAN) 0.2 % Drop 1 drop every 8 (eight) hours.    cetirizine (ZYRTEC) 10 MG tablet TAKE ONE TABLET BY MOUTH EVERY DAY    cyproheptadine (PERIACTIN) 4 mg tablet 1/2 tab po twice daily    docosahexaenoic acid-epa 120-180 mg Cap Take by mouth once daily.    dorzolamide-timolol 2-0.5% (COSOPT) 22.3-6.8 mg/mL ophthalmic solution 1 drop 3 (three) times daily.    gabapentin (NEURONTIN) 400 MG capsule Take 1 capsule (400 mg total) by mouth 2 (two) times daily.    insulin glargine,hum.rec.anlog (BASAGLAR KWIKPEN U-100 INSULIN SUBQ) Inject 20 Units into the skin once daily.    insulin glargine,hum.rec.anlog (BASAGLAR KWIKPEN U-100 INSULIN SUBQ) Inject 35 Units into the skin every evening.    latanoprost 0.005 % ophthalmic solution Place 1 drop into both eyes nightly.    lovastatin (MEVACOR) 20 MG tablet Take 1 tablet (20 mg total) by mouth nightly.    metFORMIN (GLUCOPHAGE) 1000 MG tablet Take 1 tablet (1,000 mg total) by mouth 2 (two) times daily.    montelukast (SINGULAIR) 10 mg tablet Take 1 tablet (10 mg total) by mouth nightly.    tamsulosin (FLOMAX) 0.4 mg Cap Take 1 capsule (0.4 mg total) by mouth once daily.    TRUE METRIX GLUCOSE TEST STRIP Strp once daily. Test Blood Sugar    ULTICARE PEN NEEDLE 32 gauge x 5/32" Ndle USE WITH BASAGLAR INSULIN TWICE DAILY     Family History    None       Tobacco Use    Smoking status: Some Days    Smokeless tobacco: " Never   Substance and Sexual Activity    Alcohol use: Never    Drug use: Never    Sexual activity: Not on file     Review of Systems   Constitutional:  Negative for appetite change, chills and fever.   Respiratory:  Negative for cough, shortness of breath and wheezing.    Cardiovascular:  Negative for chest pain, palpitations and leg swelling.   Gastrointestinal:  Negative for abdominal distention, diarrhea, nausea and vomiting.   Genitourinary:  Positive for difficulty urinating. Negative for dysuria.   Skin:  Negative for rash.   Neurological:  Positive for weakness. Negative for dizziness, seizures and syncope.   Psychiatric/Behavioral:  Negative for agitation, behavioral problems and confusion.    All other systems reviewed and are negative.  Objective:     Vital Signs (Most Recent):  Temp: 97.9 °F (36.6 °C) (01/19/23 0702)  Pulse: 71 (01/19/23 0702)  Resp: 18 (01/19/23 0702)  BP: 132/66 (01/19/23 0702)  SpO2: 97 % (01/19/23 0702) Vital Signs (24h Range):  Temp:  [97.9 °F (36.6 °C)-98.6 °F (37 °C)] 97.9 °F (36.6 °C)  Pulse:  [71-77] 71  Resp:  [18-22] 18  SpO2:  [97 %-100 %] 97 %  BP: (112-151)/(55-72) 132/66     Weight: 84.5 kg (186 lb 4.6 oz)  Body mass index is 27.51 kg/m².    Physical Exam  Vitals reviewed.   Constitutional:       Appearance: Normal appearance.   HENT:      Mouth/Throat:      Mouth: Mucous membranes are dry.   Eyes:      Extraocular Movements: Extraocular movements intact.      Pupils: Pupils are equal, round, and reactive to light.      Comments: Exopthalmus   Cardiovascular:      Rate and Rhythm: Normal rate and regular rhythm.   Pulmonary:      Effort: Pulmonary effort is normal. No respiratory distress.      Breath sounds: Normal breath sounds. No wheezing.   Abdominal:      General: Bowel sounds are normal. There is no distension.      Palpations: Abdomen is soft.      Tenderness: There is no abdominal tenderness.   Genitourinary:     Comments: + Bai   Musculoskeletal:          General: Normal range of motion.      Cervical back: Neck supple.   Skin:     General: Skin is warm and dry.      Coloration: Skin is not jaundiced.   Neurological:      General: No focal deficit present.      Mental Status: He is alert and oriented to person, place, and time. Mental status is at baseline.   Psychiatric:         Mood and Affect: Mood normal.         Thought Content: Thought content normal.         CRANIAL NERVES     CN III, IV, VI   Pupils are equal, round, and reactive to light.     Significant Labs: All pertinent labs within the past 24 hours have been reviewed.  Recent Lab Results  (Last 5 results in the past 24 hours)        01/19/23  0627   01/19/23  0525   01/18/23  1954   01/18/23  1645   01/18/23  1049        Anion Gap 12               Baso # 0.02               Basophil % 0.3               BUN 31               BUN/CREAT RATIO 16               Calcium 8.1               Chloride 104               CO2 25               Creatinine 1.94               Differential Type Scan Smear               eGFR 36               Eos # 0.09               Eosinophil % 1.2               Glucose 144               Hematocrit 21.6               Hemoglobin 7.4               Lymph # 1.69               Lymph % 22.2               MCH 31.2               MCHC 34.3               MCV 91.1               Mono # 0.55               Mono % 7.2               MPV 9.7               Neutrophils, Abs 5.27               Neutrophils Relative 69.1               Platelets 248               POC Glucose   148   201   169   245       Potassium 4.5               RBC 2.37               RDW 11.6               Sodium 136               WBC 7.62                                      Significant Imaging: I have reviewed all pertinent imaging results/findings within the past 24 hours.    Assessment/Plan:     * Weakness  PT/OT and monitor.       Acute on chronic blood loss anemia  Could be renal related.  Will check anemia panel and stools.        Benign prostatic hyperplasia without lower urinary tract symptoms  Continue current meds.  Needs Urology referral.  Will attempt to DC mederos one more time during stay but if he fails that will need to keep until seen by Urology.        Hyperthyroidism  Monitor TSH, was not on meds on arrival for treatment.       CHF (congestive heart failure)  Unknown type.  His fluid status seems stable.        CKD (chronic kidney disease)  Following numbers.  Slight trend up.  Will encourage fluids.  Avoid nephrotoxic drugs.       Essential hypertension    Currently on just Norvasc.  Stopped ACE due to renal issues.     Diabetes  Patient's FSGs are controlled on current medication regimen.  Last A1c reviewed-   Lab Results   Component Value Date    HGBA1C 6.2 01/11/2023     Most recent fingerstick glucose reviewed- No results for input(s): POCTGLUCOSE in the last 24 hours.  Current correctional scale  Low  Maintain anti-hyperglycemic dose as follows-   Antihyperglycemics (From admission, onward)    Start     Stop Route Frequency Ordered    01/18/23 1800  insulin aspart U-100 injection 0-5 Units         -- SubQ Before meals & nightly PRN 01/18/23 1657        Hold Oral hypoglycemics while patient is in the hospital.    VTE Risk Mitigation (From admission, onward)         Ordered     IP VTE HIGH RISK PATIENT  Once         01/18/23 1306                   Doron Godwin DO  Department of Hospital Medicine   [facility]

## 2023-01-19 NOTE — PT/OT/SLP EVAL
ST Screen    ST screen only with no skilled ST services warranted at this time. Reconsult if change in status.     Patt Luna M.S. [facility]SLP

## 2023-01-19 NOTE — PLAN OF CARE
Problem: Adult Inpatient Plan of Care  Goal: Patient-Specific Goal (Individualized)  Outcome: Ongoing, Progressing  Goal: Optimal Comfort and Wellbeing  Outcome: Ongoing, Progressing     Problem: Infection  Goal: Absence of Infection Signs and Symptoms  Outcome: Ongoing, Progressing     Problem: Skin Injury Risk Increased  Goal: Skin Health and Integrity  Outcome: Ongoing, Progressing

## 2023-01-19 NOTE — SUBJECTIVE & OBJECTIVE
Past Medical History:   Diagnosis Date    CHF (congestive heart failure)     Diabetes     Hypertension        History reviewed. No pertinent surgical history.    Review of patient's allergies indicates:  No Known Allergies    Current Facility-Administered Medications on File Prior to Encounter   Medication    [DISCONTINUED] acetaminophen tablet 650 mg    [DISCONTINUED] aluminum-magnesium hydroxide-simethicone 200-200-20 mg/5 mL suspension 30 mL    [DISCONTINUED] amLODIPine tablet 5 mg    [DISCONTINUED] brimonidine 0.2% ophthalmic solution 1 drop    [DISCONTINUED] dextrose 50% injection 12.5 g    [DISCONTINUED] dorzolamide-timolol 2-0.5% ophthalmic solution 1 drop    [DISCONTINUED] enoxaparin injection 30 mg    [DISCONTINUED] finasteride tablet 5 mg    [DISCONTINUED] gabapentin capsule 100 mg    [DISCONTINUED] glucagon (human recombinant) injection 1 mg    [DISCONTINUED] insulin aspart U-100 injection 0-5 Units    [DISCONTINUED] magnesium hydroxide 400 mg/5 ml suspension 2,400 mg    [DISCONTINUED] melatonin tablet 6 mg    [DISCONTINUED] mupirocin 2 % ointment    [DISCONTINUED] ondansetron injection 4 mg    [DISCONTINUED] senna tablet 8.6 mg    [DISCONTINUED] sodium chloride 0.9% flush 10 mL    [DISCONTINUED] sulfamethoxazole-trimethoprim 800-160mg per tablet 1 tablet    [DISCONTINUED] tamsulosin 24 hr capsule 0.8 mg     Current Outpatient Medications on File Prior to Encounter   Medication Sig    allopurinoL (ZYLOPRIM) 100 MG tablet Take 1 tablet (100 mg total) by mouth once daily.    amlodipine-benazepril 5-20 mg (LOTREL) 5-20 mg per capsule Take 1 capsule by mouth once daily.    brimonidine 0.2% (ALPHAGAN) 0.2 % Drop 1 drop every 8 (eight) hours.    cetirizine (ZYRTEC) 10 MG tablet TAKE ONE TABLET BY MOUTH EVERY DAY    cyproheptadine (PERIACTIN) 4 mg tablet 1/2 tab po twice daily    docosahexaenoic acid-epa 120-180 mg Cap Take by mouth once daily.    dorzolamide-timolol 2-0.5% (COSOPT) 22.3-6.8 mg/mL ophthalmic  "solution 1 drop 3 (three) times daily.    gabapentin (NEURONTIN) 400 MG capsule Take 1 capsule (400 mg total) by mouth 2 (two) times daily.    insulin glargine,hum.rec.anlog (BASAGLAR KWIKPEN U-100 INSULIN SUBQ) Inject 20 Units into the skin once daily.    insulin glargine,hum.rec.anlog (BASAGLAR KWIKPEN U-100 INSULIN SUBQ) Inject 35 Units into the skin every evening.    latanoprost 0.005 % ophthalmic solution Place 1 drop into both eyes nightly.    lovastatin (MEVACOR) 20 MG tablet Take 1 tablet (20 mg total) by mouth nightly.    metFORMIN (GLUCOPHAGE) 1000 MG tablet Take 1 tablet (1,000 mg total) by mouth 2 (two) times daily.    montelukast (SINGULAIR) 10 mg tablet Take 1 tablet (10 mg total) by mouth nightly.    tamsulosin (FLOMAX) 0.4 mg Cap Take 1 capsule (0.4 mg total) by mouth once daily.    TRUE METRIX GLUCOSE TEST STRIP Strp once daily. Test Blood Sugar    ULTICARE PEN NEEDLE 32 gauge x 5/32" Ndle USE WITH BASAGLAR INSULIN TWICE DAILY     Family History    None       Tobacco Use    Smoking status: Some Days    Smokeless tobacco: Never   Substance and Sexual Activity    Alcohol use: Never    Drug use: Never    Sexual activity: Not on file     Review of Systems   Constitutional:  Negative for appetite change, chills and fever.   Respiratory:  Negative for cough, shortness of breath and wheezing.    Cardiovascular:  Negative for chest pain, palpitations and leg swelling.   Gastrointestinal:  Negative for abdominal distention, diarrhea, nausea and vomiting.   Genitourinary:  Positive for difficulty urinating. Negative for dysuria.   Skin:  Negative for rash.   Neurological:  Positive for weakness. Negative for dizziness, seizures and syncope.   Psychiatric/Behavioral:  Negative for agitation, behavioral problems and confusion.    All other systems reviewed and are negative.  Objective:     Vital Signs (Most Recent):  Temp: 97.9 °F (36.6 °C) (01/19/23 0702)  Pulse: 71 (01/19/23 0702)  Resp: 18 (01/19/23 " 0702)  BP: 132/66 (01/19/23 0702)  SpO2: 97 % (01/19/23 0702) Vital Signs (24h Range):  Temp:  [97.9 °F (36.6 °C)-98.6 °F (37 °C)] 97.9 °F (36.6 °C)  Pulse:  [71-77] 71  Resp:  [18-22] 18  SpO2:  [97 %-100 %] 97 %  BP: (112-151)/(55-72) 132/66     Weight: 84.5 kg (186 lb 4.6 oz)  Body mass index is 27.51 kg/m².    Physical Exam  Vitals reviewed.   Constitutional:       Appearance: Normal appearance.   HENT:      Mouth/Throat:      Mouth: Mucous membranes are dry.   Eyes:      Extraocular Movements: Extraocular movements intact.      Pupils: Pupils are equal, round, and reactive to light.      Comments: Exopthalmus   Cardiovascular:      Rate and Rhythm: Normal rate and regular rhythm.   Pulmonary:      Effort: Pulmonary effort is normal. No respiratory distress.      Breath sounds: Normal breath sounds. No wheezing.   Abdominal:      General: Bowel sounds are normal. There is no distension.      Palpations: Abdomen is soft.      Tenderness: There is no abdominal tenderness.   Genitourinary:     Comments: + Bai   Musculoskeletal:         General: Normal range of motion.      Cervical back: Neck supple.   Skin:     General: Skin is warm and dry.      Coloration: Skin is not jaundiced.   Neurological:      General: No focal deficit present.      Mental Status: He is alert and oriented to person, place, and time. Mental status is at baseline.   Psychiatric:         Mood and Affect: Mood normal.         Thought Content: Thought content normal.         CRANIAL NERVES     CN III, IV, VI   Pupils are equal, round, and reactive to light.     Significant Labs: All pertinent labs within the past 24 hours have been reviewed.  Recent Lab Results  (Last 5 results in the past 24 hours)        01/19/23  0627   01/19/23  0525   01/18/23  1954   01/18/23  1645   01/18/23  1049        Anion Gap 12               Baso # 0.02               Basophil % 0.3               BUN 31               BUN/CREAT RATIO 16               Calcium 8.1                Chloride 104               CO2 25               Creatinine 1.94               Differential Type Scan Smear               eGFR 36               Eos # 0.09               Eosinophil % 1.2               Glucose 144               Hematocrit 21.6               Hemoglobin 7.4               Lymph # 1.69               Lymph % 22.2               MCH 31.2               MCHC 34.3               MCV 91.1               Mono # 0.55               Mono % 7.2               MPV 9.7               Neutrophils, Abs 5.27               Neutrophils Relative 69.1               Platelets 248               POC Glucose   148   201   169   245       Potassium 4.5               RBC 2.37               RDW 11.6               Sodium 136               WBC 7.62                                      Significant Imaging: I have reviewed all pertinent imaging results/findings within the past 24 hours.

## 2023-01-20 LAB
ANION GAP SERPL CALCULATED.3IONS-SCNC: 11 MMOL/L (ref 7–16)
BASOPHILS # BLD AUTO: 0.01 K/UL (ref 0–0.2)
BASOPHILS NFR BLD AUTO: 0.1 % (ref 0–1)
BUN SERPL-MCNC: 28 MG/DL (ref 7–18)
BUN/CREAT SERPL: 14 (ref 6–20)
CALCIUM SERPL-MCNC: 8.9 MG/DL (ref 8.5–10.1)
CHLORIDE SERPL-SCNC: 105 MMOL/L (ref 98–107)
CO2 SERPL-SCNC: 26 MMOL/L (ref 21–32)
CREAT SERPL-MCNC: 1.95 MG/DL (ref 0.7–1.3)
DIFFERENTIAL METHOD BLD: ABNORMAL
EGFR (NO RACE VARIABLE) (RUSH/TITUS): 35 ML/MIN/1.73M²
EOSINOPHIL # BLD AUTO: 0.07 K/UL (ref 0–0.5)
EOSINOPHIL NFR BLD AUTO: 1 % (ref 1–4)
ERYTHROCYTE [DISTWIDTH] IN BLOOD BY AUTOMATED COUNT: 11.6 % (ref 11.5–14.5)
FERRITIN SERPL-MCNC: 269 NG/ML (ref 26–388)
FOLATE SERPL-MCNC: 5.2 NG/ML (ref 3.1–17.5)
GLUCOSE SERPL-MCNC: 140 MG/DL (ref 70–105)
GLUCOSE SERPL-MCNC: 144 MG/DL (ref 74–106)
GLUCOSE SERPL-MCNC: 216 MG/DL (ref 70–105)
HCT VFR BLD AUTO: 25.3 % (ref 40–54)
HGB BLD-MCNC: 8.5 G/DL (ref 13.5–18)
IMM RETICS NFR: 17.3 % (ref 2.3–13.4)
IRON SATN MFR SERPL: 41 % (ref 14–50)
IRON SERPL-MCNC: 80 ΜG/DL (ref 65–175)
LYMPHOCYTES # BLD AUTO: 1.46 K/UL (ref 1–4.8)
LYMPHOCYTES NFR BLD AUTO: 19.9 % (ref 27–41)
MCH RBC QN AUTO: 30.8 PG (ref 27–31)
MCHC RBC AUTO-ENTMCNC: 33.6 G/DL (ref 32–36)
MCV RBC AUTO: 91.7 FL (ref 80–96)
MONOCYTES # BLD AUTO: 0.46 K/UL (ref 0–0.8)
MONOCYTES NFR BLD AUTO: 6.3 % (ref 2–6)
MPC BLD CALC-MCNC: 10.5 FL (ref 9.4–12.4)
NEUTROPHILS # BLD AUTO: 5.32 K/UL (ref 1.8–7.7)
NEUTROPHILS NFR BLD AUTO: 72.7 % (ref 53–65)
PLATELET # BLD AUTO: 319 K/UL (ref 150–400)
POTASSIUM SERPL-SCNC: 4.9 MMOL/L (ref 3.5–5.1)
RBC # BLD AUTO: 2.76 M/UL (ref 4.6–6.2)
RETICS # AUTO: 0.06 X10E6/UL (ref 0.02–0.11)
RETICS/RBC NFR AUTO: 2 % (ref 0.4–2.2)
SODIUM SERPL-SCNC: 137 MMOL/L (ref 136–145)
TIBC SERPL-MCNC: 195 ΜG/DL (ref 250–450)
VIT B12 SERPL-MCNC: 629 PG/ML (ref 193–986)
WBC # BLD AUTO: 7.32 K/UL (ref 4.5–11)

## 2023-01-20 PROCEDURE — 27000958

## 2023-01-20 PROCEDURE — 97116 GAIT TRAINING THERAPY: CPT | Mod: CQ

## 2023-01-20 PROCEDURE — 85025 COMPLETE CBC W/AUTO DIFF WBC: CPT | Performed by: HOSPITALIST

## 2023-01-20 PROCEDURE — 11000004 HC SNF PRIVATE

## 2023-01-20 PROCEDURE — 97530 THERAPEUTIC ACTIVITIES: CPT

## 2023-01-20 PROCEDURE — 97110 THERAPEUTIC EXERCISES: CPT

## 2023-01-20 PROCEDURE — 99308 PR NURSING FAC CARE, SUBSEQ, MINOR COMPLIC: ICD-10-PCS | Mod: ,,, | Performed by: HOSPITALIST

## 2023-01-20 PROCEDURE — 99308 SBSQ NF CARE LOW MDM 20: CPT | Mod: ,,, | Performed by: HOSPITALIST

## 2023-01-20 PROCEDURE — 82962 GLUCOSE BLOOD TEST: CPT

## 2023-01-20 PROCEDURE — 97535 SELF CARE MNGMENT TRAINING: CPT

## 2023-01-20 PROCEDURE — 63600175 PHARM REV CODE 636 W HCPCS: Performed by: HOSPITALIST

## 2023-01-20 PROCEDURE — 25000003 PHARM REV CODE 250: Performed by: HOSPITALIST

## 2023-01-20 PROCEDURE — 82728 ASSAY OF FERRITIN: CPT | Performed by: HOSPITALIST

## 2023-01-20 PROCEDURE — 82746 ASSAY OF FOLIC ACID SERUM: CPT | Performed by: HOSPITALIST

## 2023-01-20 PROCEDURE — 83540 ASSAY OF IRON: CPT | Performed by: HOSPITALIST

## 2023-01-20 PROCEDURE — 83550 IRON BINDING TEST: CPT | Performed by: HOSPITALIST

## 2023-01-20 PROCEDURE — 36415 COLL VENOUS BLD VENIPUNCTURE: CPT | Performed by: HOSPITALIST

## 2023-01-20 PROCEDURE — 85045 AUTOMATED RETICULOCYTE COUNT: CPT | Performed by: HOSPITALIST

## 2023-01-20 PROCEDURE — 80048 BASIC METABOLIC PNL TOTAL CA: CPT | Performed by: HOSPITALIST

## 2023-01-20 PROCEDURE — 97110 THERAPEUTIC EXERCISES: CPT | Mod: CQ

## 2023-01-20 RX ADMIN — GABAPENTIN 100 MG: 100 CAPSULE ORAL at 08:01

## 2023-01-20 RX ADMIN — TAMSULOSIN HYDROCHLORIDE 0.8 MG: 0.4 CAPSULE ORAL at 08:01

## 2023-01-20 RX ADMIN — DORZOLAMIDE HYDROCHLORIDE AND TIMOLOL MALEATE 1 DROP: 20; 5 SOLUTION/ DROPS OPHTHALMIC at 02:01

## 2023-01-20 RX ADMIN — SULFAMETHOXAZOLE AND TRIMETHOPRIM 1 TABLET: 800; 160 TABLET ORAL at 08:01

## 2023-01-20 RX ADMIN — DORZOLAMIDE HYDROCHLORIDE AND TIMOLOL MALEATE 1 DROP: 20; 5 SOLUTION/ DROPS OPHTHALMIC at 08:01

## 2023-01-20 RX ADMIN — MUPIROCIN: 20 OINTMENT TOPICAL at 08:01

## 2023-01-20 RX ADMIN — AMLODIPINE BESYLATE 5 MG: 5 TABLET ORAL at 08:01

## 2023-01-20 RX ADMIN — FINASTERIDE 5 MG: 5 TABLET, FILM COATED ORAL at 08:01

## 2023-01-20 RX ADMIN — BRIMONIDINE TARTRATE 1 DROP: 2 SOLUTION/ DROPS OPHTHALMIC at 02:01

## 2023-01-20 RX ADMIN — INSULIN ASPART 2 UNITS: 100 INJECTION, SOLUTION INTRAVENOUS; SUBCUTANEOUS at 10:01

## 2023-01-20 RX ADMIN — ACETAMINOPHEN 650 MG: 325 TABLET ORAL at 06:01

## 2023-01-20 RX ADMIN — BRIMONIDINE TARTRATE 1 DROP: 2 SOLUTION/ DROPS OPHTHALMIC at 08:01

## 2023-01-20 RX ADMIN — BRIMONIDINE TARTRATE 1 DROP: 2 SOLUTION/ DROPS OPHTHALMIC at 05:01

## 2023-01-20 NOTE — NURSING
Patient urinated approx 50cc urine in urinal. Encouraging po fluids. Patient verbalized understanding.

## 2023-01-20 NOTE — PT/OT/SLP PROGRESS
Occupational Therapy   Treatment    Name: Jake Brown  MRN: [id number]  Admitting Diagnosis:  Weakness       Recommendations:     Discharge Recommendations: home health OT, home with home health  Discharge Equipment Recommendations:  walker, rolling  Barriers to discharge:  None    Assessment:     Jake Brown is a 73 y.o. male with a medical diagnosis of Weakness.  He presents with agreeable to tx in OT gym. Performance deficits affecting function are weakness, impaired endurance, gait instability, impaired balance.     Rehab Prognosis:  Good; patient would benefit from acute skilled OT services to address these deficits and reach maximum level of function.       Plan:     Patient to be seen 5 x/week to address the above listed problems via self-care/home management, therapeutic activities, therapeutic exercises  Plan of Care Expires: 01/27/23  Plan of Care Reviewed with: patient    Subjective     Pain/Comfort:       Objective:     Communicated with: pt prior to session.  Patient found up in chair with mederos catheter  upon OT entry to room.    General Precautions: Standard, fall, diabetic    Orthopedic Precautions:   Braces: N/A  Respiratory Status: Room air     Occupational Performance:     Bed Mobility:    Na today    Functional Mobility/Transfers:  Na today other than sit to stand at SBA/mod i    Activities of Daily Living:  Lower Body Dressing: minimum assistance to get urinary catheter threaded through his pant leg.  Pt able to mostly do on his own after feet threaded      AMPAC 6 Click ADL:      Treatment & Education:  OT engaged pt in UBE x 15 min at level 2 resist for endurance training as well as strength of UB.  Then, pt completed green putty small object search to promote FM strength and functional skill.  Pt tolerated all well    Patient left up in chair with  nursing present    GOALS:   Multidisciplinary Problems       Occupational Therapy Goals          Problem: Occupational Therapy    Goal Priority  Disciplines Outcome Interventions   Occupational Therapy Goal     OT, PT/OT Ongoing, Progressing    Description: STG:    Pt will increase UB strength to WFL for functional tasks including using AD safely and effectively  Pt will improve endurance to good for functional tasks including self care  Pt will tolerate 30 minutes of tx without fatigue      LT.Restore to max I with self care and mobility.                          Time Tracking:     OT Date of Treatment: 23  OT Start Time: 1335  OT Stop Time: 1431  OT Total Time (min): 56 min    Billable Minutes:Self Care/Home Management 10  Therapeutic Activity 30  Therapeutic Exercise 16    OT/ALINA: OT          2023

## 2023-01-20 NOTE — NURSING
Removed 16 Fr mederos cath from patient. Tolerated well. Encouraged to push fluid intake by mouth . Call nurse when he voids or has the urge to void . Pt verbalized understand

## 2023-01-20 NOTE — HOSPITAL COURSE
1/20 Continues to do well.  Will try to remove mederos today and see if able to urinate on his own.  Encourage fluids.    1/21 1130 Called to room by Pt Nurse Ambrocio, who reports pt c/o bladder distention.  States pt is voiding 30-40 ml every hour or so.  Straight cath for residual with 1300 ml output.  Pt reports relief.  Will continue to monitor strict I & O  1/21 2130 called by TERRIE Light LPN, pt c/o bladder distention again, has only voided 40 ml since last straight cath.  Pt reports is trying to go but cannot.  Will replace mederos.    1/23 Mederos placed back with 3L or UOP.  Will keep until Urology follow up. Otherwise doing ok,  Working with therapy.     1/26 Labs show worsening renal function, H/H trending down as well.  Not sure if he is drinking enough fluids.  Not on any diuretics.      1/27 Labs improving slowly.  Continue IVFs today.  He wants to go home.  I told him if labs better tomorrow he can.  Will need to keep mederos at PR.  Instructed nurses to teach him mederos care today.

## 2023-01-20 NOTE — SUBJECTIVE & OBJECTIVE
Interval History: no major issues, working with therapy.  Try DC mederos today.     Review of Systems   Respiratory:  Negative for shortness of breath.    Cardiovascular:  Negative for chest pain.   Gastrointestinal:  Negative for abdominal pain, nausea and vomiting.   Genitourinary:  Positive for difficulty urinating.   Psychiatric/Behavioral:  Negative for agitation and confusion.    All other systems reviewed and are negative.  Objective:     Vital Signs (Most Recent):  Temp: 98.1 °F (36.7 °C) (01/20/23 0702)  Pulse: 74 (01/20/23 0702)  Resp: 20 (01/20/23 0702)  BP: (!) 164/68 (01/20/23 0702)  SpO2: 98 % (01/20/23 0702)   Vital Signs (24h Range):  Temp:  [98.1 °F (36.7 °C)] 98.1 °F (36.7 °C)  Pulse:  [74-82] 74  Resp:  [20] 20  SpO2:  [98 %-100 %] 98 %  BP: (154-164)/(66-68) 164/68     Weight: 81.7 kg (180 lb 1.9 oz)  Body mass index is 26.6 kg/m².    Intake/Output Summary (Last 24 hours) at 1/20/2023 1105  Last data filed at 1/20/2023 0913  Gross per 24 hour   Intake 1320 ml   Output 3825 ml   Net -2505 ml      Physical Exam  Vitals reviewed.   Constitutional:       Appearance: Normal appearance.   HENT:      Mouth/Throat:      Mouth: Mucous membranes are dry.   Eyes:      Extraocular Movements: Extraocular movements intact.      Pupils: Pupils are equal, round, and reactive to light.      Comments: Exopthalmus   Cardiovascular:      Rate and Rhythm: Normal rate and regular rhythm.   Pulmonary:      Effort: Pulmonary effort is normal. No respiratory distress.      Breath sounds: Normal breath sounds. No wheezing.   Abdominal:      General: Bowel sounds are normal. There is no distension.      Palpations: Abdomen is soft.      Tenderness: There is no abdominal tenderness.   Genitourinary:     Comments: + Mederos   Musculoskeletal:         General: Normal range of motion.      Cervical back: Neck supple.   Skin:     General: Skin is warm and dry.      Coloration: Skin is not jaundiced.   Neurological:      General: No  focal deficit present.      Mental Status: He is alert and oriented to person, place, and time. Mental status is at baseline.   Psychiatric:         Mood and Affect: Mood normal.         Thought Content: Thought content normal.       Significant Labs: All pertinent labs within the past 24 hours have been reviewed.  Recent Lab Results  (Last 5 results in the past 24 hours)        01/20/23  1044   01/20/23  1040   01/20/23  0530   01/19/23  2026   01/19/23  1723        Anion Gap     11           Baso # 0.01               Basophil % 0.1               BUN     28           BUN/CREAT RATIO     14           Calcium     8.9           Chloride     105           CO2     26           Creatinine     1.95           Differential Type Scan Smear               eGFR     35           Eos # 0.07               Eosinophil % 1.0               Glucose     144           Hematocrit 25.3               Hemoglobin 8.5               Lymph # 1.46               Lymph % 19.9               MCH 30.8               MCHC 33.6               MCV 91.7               Mono # 0.46               Mono % 6.3               MPV 10.5               Neutrophils, Abs 5.32               Neutrophils Relative 72.7               Platelets 319               POC Glucose   216     295   160       Potassium     4.9           RBC 2.76               RDW 11.6               Sodium     137           WBC 7.32                                      Significant Imaging: I have reviewed all pertinent imaging results/findings within the past 24 hours.

## 2023-01-20 NOTE — PROGRESS NOTES
[facility] Medicine  Progress Note    Patient Name: Jake Brown  MRN: [id number]  Patient Class: IP- Swing   Admission Date: 1/18/2023  Length of Stay: 2 days  Attending Physician: Doron Godwin DO  Primary Care Provider: Osvaldo Hodges MD        Subjective:     Principal Problem:Weakness        HPI:  74yo BM with hx of CKD, HTN, DMII, admit to SWB s/p Sepsis and renal failure 2nd to UTI and dehydration.  He has had a mederos now due to retention.  Does not see a Urologist.  He is followed by Nephrologist.  He was evaluated and felt like he would benefit from SWB therapy.  H/H has trended down he denies any blood in stool.  No fevers or chills.  Doing well and participating.       Overview/Hospital Course:  1/20 Continues to do well.  Will try to remove mederos today and see if able to urinate on his own.  Encourage fluids.        Interval History: no major issues, working with therapy.  Try DC mederos today.     Review of Systems   Respiratory:  Negative for shortness of breath.    Cardiovascular:  Negative for chest pain.   Gastrointestinal:  Negative for abdominal pain, nausea and vomiting.   Genitourinary:  Positive for difficulty urinating.   Psychiatric/Behavioral:  Negative for agitation and confusion.    All other systems reviewed and are negative.  Objective:     Vital Signs (Most Recent):  Temp: 98.1 °F (36.7 °C) (01/20/23 0702)  Pulse: 74 (01/20/23 0702)  Resp: 20 (01/20/23 0702)  BP: (!) 164/68 (01/20/23 0702)  SpO2: 98 % (01/20/23 0702)   Vital Signs (24h Range):  Temp:  [98.1 °F (36.7 °C)] 98.1 °F (36.7 °C)  Pulse:  [74-82] 74  Resp:  [20] 20  SpO2:  [98 %-100 %] 98 %  BP: (154-164)/(66-68) 164/68     Weight: 81.7 kg (180 lb 1.9 oz)  Body mass index is 26.6 kg/m².    Intake/Output Summary (Last 24 hours) at 1/20/2023 1105  Last data filed at 1/20/2023 0913  Gross per 24 hour   Intake 1320 ml   Output 3825 ml   Net -2505 ml      Physical Exam  Vitals reviewed.    Constitutional:       Appearance: Normal appearance.   HENT:      Mouth/Throat:      Mouth: Mucous membranes are dry.   Eyes:      Extraocular Movements: Extraocular movements intact.      Pupils: Pupils are equal, round, and reactive to light.      Comments: Exopthalmus   Cardiovascular:      Rate and Rhythm: Normal rate and regular rhythm.   Pulmonary:      Effort: Pulmonary effort is normal. No respiratory distress.      Breath sounds: Normal breath sounds. No wheezing.   Abdominal:      General: Bowel sounds are normal. There is no distension.      Palpations: Abdomen is soft.      Tenderness: There is no abdominal tenderness.   Genitourinary:     Comments: + Bai   Musculoskeletal:         General: Normal range of motion.      Cervical back: Neck supple.   Skin:     General: Skin is warm and dry.      Coloration: Skin is not jaundiced.   Neurological:      General: No focal deficit present.      Mental Status: He is alert and oriented to person, place, and time. Mental status is at baseline.   Psychiatric:         Mood and Affect: Mood normal.         Thought Content: Thought content normal.       Significant Labs: All pertinent labs within the past 24 hours have been reviewed.  Recent Lab Results  (Last 5 results in the past 24 hours)        01/20/23  1044   01/20/23  1040   01/20/23  0530   01/19/23  2026   01/19/23  1723        Anion Gap     11           Baso # 0.01               Basophil % 0.1               BUN     28           BUN/CREAT RATIO     14           Calcium     8.9           Chloride     105           CO2     26           Creatinine     1.95           Differential Type Scan Smear               eGFR     35           Eos # 0.07               Eosinophil % 1.0               Glucose     144           Hematocrit 25.3               Hemoglobin 8.5               Lymph # 1.46               Lymph % 19.9               MCH 30.8               MCHC 33.6               MCV 91.7               Mono # 0.46                Mono % 6.3               MPV 10.5               Neutrophils, Abs 5.32               Neutrophils Relative 72.7               Platelets 319               POC Glucose   216     295   160       Potassium     4.9           RBC 2.76               RDW 11.6               Sodium     137           WBC 7.32                                      Significant Imaging: I have reviewed all pertinent imaging results/findings within the past 24 hours.      Assessment/Plan:      * Weakness  PT/OT and monitor.       Acute on chronic blood loss anemia  Could be renal related.  Occult stool neg.  Likely CKD related.  Anemia panel pending.       Benign prostatic hyperplasia without lower urinary tract symptoms  Continue current meds.  Needs Urology referral.  Will attempt to DC mederos one more time during stay but if he fails that will need to keep until seen by Urology.        Hyperthyroidism  Monitor TSH, was not on meds on arrival for treatment.       CHF (congestive heart failure)  Unknown type.  His fluid status seems stable.        CKD (chronic kidney disease)  Following numbers.  Slight trend up.  Will encourage fluids.  Avoid nephrotoxic drugs.       Essential hypertension    Currently on just Norvasc.  Stopped ACE due to renal issues.     Diabetes  Patient's FSGs are controlled on current medication regimen.  Last A1c reviewed-   Lab Results   Component Value Date    HGBA1C 6.2 01/11/2023     Most recent fingerstick glucose reviewed- No results for input(s): POCTGLUCOSE in the last 24 hours.  Current correctional scale  Low  Maintain anti-hyperglycemic dose as follows-   Antihyperglycemics (From admission, onward)    Start     Stop Route Frequency Ordered    01/18/23 1800  insulin aspart U-100 injection 0-5 Units         -- SubQ Before meals & nightly PRN 01/18/23 1657        Hold Oral hypoglycemics while patient is in the hospital.      VTE Risk Mitigation (From admission, onward)         Ordered     IP VTE HIGH RISK  PATIENT  Once         01/18/23 1306                Discharge Planning   KARINA:      Code Status: Full Code   Is the patient medically ready for discharge?:     Reason for patient still in hospital (select all that apply): Patient trending condition and Laboratory test  Discharge Plan A: Home with family, Home Health                  Doron Godwin DO  Department of Hospital Medicine   [facility]

## 2023-01-20 NOTE — PLAN OF CARE
Problem: Adult Inpatient Plan of Care  Goal: Optimal Comfort and Wellbeing  Outcome: Ongoing, Progressing  Goal: Readiness for Transition of Care  Outcome: Ongoing, Progressing     Problem: Adult Inpatient Plan of Care  Goal: Readiness for Transition of Care  Outcome: Ongoing, Progressing

## 2023-01-20 NOTE — PT/OT/SLP PROGRESS
Physical Therapy Treatment    Patient Name:  Jake Brown   MRN:  [id number]    Recommendations:     Discharge Recommendations: home  Discharge Equipment Recommendations: none  Barriers to discharge: Inaccessible home    Assessment:     Jake Brown is a 73 y.o. male admitted with a medical diagnosis of Weakness.  He presents with the following impairments/functional limitations: weakness, gait instability, impaired balance, impaired functional mobility. Patient able to increase gait distance this date. Patient occasionally veers to the R side while gait training. Patient with no reports of adverse effects after completion of treatment.     Rehab Prognosis: Good; patient would benefit from acute skilled PT services to address these deficits and reach maximum level of function.    Recent Surgery: * No surgery found *      Plan:     During this hospitalization, patient to be seen 5 x/week to address the identified rehab impairments via gait training, therapeutic activities, therapeutic exercises and progress toward the following goals:    Plan of Care Expires:  02/03/23    Subjective     Chief Complaint: Patient with no complaints.   Patient/Family Comments/goals: pt to dc home upon dc from hosp  Pain/Comfort:  Pain Rating 1: 0/10      Objective:     Communicated with supervising PT prior to session. Patient found HOB elevated with mederos catheter upon PTA entry to room.     General Precautions: Standard, fall  Orthopedic Precautions:    Braces:    Respiratory Status: Room air     Functional Mobility:  Transfers:     Sit to Stand:  contact guard assistance with rolling walker  Gait: Pt ambulated 250 feet with RW and CGA.      AM-PAC 6 CLICK MOBILITY  Turning over in bed (including adjusting bedclothes, sheets and blankets)?: 4  Sitting down on and standing up from a chair with arms (e.g., wheelchair, bedside commode, etc.): 3  Moving from lying on back to sitting on the side of the bed?: 4  Moving to and from a bed to a  chair (including a wheelchair)?: 3  Need to walk in hospital room?: 3  Climbing 3-5 steps with a railing?: 1  Basic Mobility Total Score: 18       Treatment & Education:  Patient performed the following therapeutic exercises BLE: HS curls with red TB 10x, Hip ADD with red TB 15x, Hip ABD with red TB 15x, seated marching with 2#, LAQs with 2#, Hip EXT with red TB  Nustep 10 minutes L2  PTA also provided patient with HEP to perform this weekend consisting of seated marching, LAQs, SLRs, and hip ADD/ABD with knee extended.      Patient left up in chair with call button in reach and OT notified.    GOALS:   Multidisciplinary Problems       Physical Therapy Goals          Problem: Physical Therapy    Goal Priority Disciplines Outcome Goal Variances Interventions   Physical Therapy Goal     PT, PT/OT Ongoing, Progressing     Description: LTG - 2 weeks  1. Pt will have 4/5 BLE strength.  2. Pt  with STS and bed transfers.  3. Pt SBA to negotiated stairs with rail.  4. Pt will amb with appropriate AD x 250 ft with svn level surfaces and SBA unlevel surfaces.  5. Pt indep with bed mob.                       Time Tracking:     PT Received On: 01/20/23  PT Start Time: 0923     PT Stop Time: 1012  PT Total Time (min): 49 min     Billable Minutes: Gait Training 10 and Therapeutic Exercise 39    Treatment Type: Treatment  PT/PTA: PTA     PTA Visit Number: 1     01/20/2023

## 2023-01-21 LAB
GLUCOSE SERPL-MCNC: 164 MG/DL (ref 70–105)
GLUCOSE SERPL-MCNC: 196 MG/DL (ref 70–105)
GLUCOSE SERPL-MCNC: 207 MG/DL (ref 70–105)
GLUCOSE SERPL-MCNC: 225 MG/DL (ref 70–105)

## 2023-01-21 PROCEDURE — 82962 GLUCOSE BLOOD TEST: CPT

## 2023-01-21 PROCEDURE — 51702 INSERT TEMP BLADDER CATH: CPT

## 2023-01-21 PROCEDURE — 11000004 HC SNF PRIVATE

## 2023-01-21 PROCEDURE — 27000958

## 2023-01-21 PROCEDURE — 63600175 PHARM REV CODE 636 W HCPCS: Performed by: HOSPITALIST

## 2023-01-21 PROCEDURE — 25000003 PHARM REV CODE 250: Performed by: HOSPITALIST

## 2023-01-21 RX ADMIN — BRIMONIDINE TARTRATE 1 DROP: 2 SOLUTION/ DROPS OPHTHALMIC at 02:01

## 2023-01-21 RX ADMIN — BRIMONIDINE TARTRATE 1 DROP: 2 SOLUTION/ DROPS OPHTHALMIC at 05:01

## 2023-01-21 RX ADMIN — DORZOLAMIDE HYDROCHLORIDE AND TIMOLOL MALEATE 1 DROP: 20; 5 SOLUTION/ DROPS OPHTHALMIC at 08:01

## 2023-01-21 RX ADMIN — GABAPENTIN 100 MG: 100 CAPSULE ORAL at 08:01

## 2023-01-21 RX ADMIN — TAMSULOSIN HYDROCHLORIDE 0.8 MG: 0.4 CAPSULE ORAL at 08:01

## 2023-01-21 RX ADMIN — INSULIN ASPART 2 UNITS: 100 INJECTION, SOLUTION INTRAVENOUS; SUBCUTANEOUS at 05:01

## 2023-01-21 RX ADMIN — SULFAMETHOXAZOLE AND TRIMETHOPRIM 1 TABLET: 800; 160 TABLET ORAL at 08:01

## 2023-01-21 RX ADMIN — AMLODIPINE BESYLATE 5 MG: 5 TABLET ORAL at 08:01

## 2023-01-21 RX ADMIN — FINASTERIDE 5 MG: 5 TABLET, FILM COATED ORAL at 08:01

## 2023-01-21 RX ADMIN — BRIMONIDINE TARTRATE 1 DROP: 2 SOLUTION/ DROPS OPHTHALMIC at 10:01

## 2023-01-21 RX ADMIN — DORZOLAMIDE HYDROCHLORIDE AND TIMOLOL MALEATE 1 DROP: 20; 5 SOLUTION/ DROPS OPHTHALMIC at 03:01

## 2023-01-21 RX ADMIN — INSULIN ASPART 1 UNITS: 100 INJECTION, SOLUTION INTRAVENOUS; SUBCUTANEOUS at 08:01

## 2023-01-21 NOTE — NURSING
"Mr. Brown is sitting in chair trying to void into a urinal.  He states he can not go and wants the mederos "put back in".  Attempted to calm patient and tell him to relax, but he is not accepting right now.  Notified provider in ER of patient's situation and request.  No new orders given.  "

## 2023-01-21 NOTE — NURSING
Mr. Brown has spent the entire night sitting up in the wheel chair.  He stated he needed to stay up and try to void.  He reports a feeling of fullness and urgency but has voided small amounts (100-200cc) at different times through out the night.  He has requested that the mederos be re-inserted, orders were not to re-insert mederos if patient is producing any urine at all.  This was explained to Mr. Brown each time he requested to have mederos placed.

## 2023-01-21 NOTE — PLAN OF CARE
Problem: Urine Elimination Impaired (Urinary Diversion)  Goal: Effective Urinary Elimination  Outcome: Ongoing, Progressing

## 2023-01-21 NOTE — NURSING
Patient sitting up in w/c, holding urinal, 100cc in urinal, patient asking about mederos, notified ER NP, instructed to keep up with urine every hour  and as long as at least 30cc urine a hour , will keep mederos out. Patient verbalized understanding. Call light within reach. Patient drinking fluids and eating breakfast.

## 2023-01-21 NOTE — NURSING
Patient voided 40cc prior to in & out cath per one time order,  In & out cath done , 1350cc urine obtained. Patient left resting in bed, no c/o at this time. Continuing to monitor urine output.

## 2023-01-21 NOTE — NURSING
Nurses Note -- 4 Eyes      1/21/2023   10:48 AM      Skin assessed during: Q Shift Change      [x] No Pressure Injuries Present    []Prevention Measures Documented      [] Yes- Altered Skin Integrity Present or Discovered   [] LDA Added if Not in Epic (Describe Wound)   [] New Altered Skin Integrity was Present on Admit and Documented in LDA   [] Wound Image Taken    Wound Care Consulted? No    Attending Nurse:  Keiko Traore LPN     Second RN/Staff Member: ZAKIA Vasquez RN

## 2023-01-21 NOTE — NURSING
A family member of Mr. Brown just came to the nurses desk asking for help for Mr. Brown.  He states that patient is in a lot of pain because he needs to pee and he can't.  He asked why he no longer has the mederos.  We assured him that we are aware of this and have notified the NP in the ER of the situation.

## 2023-01-22 LAB
GLUCOSE SERPL-MCNC: 121 MG/DL (ref 70–105)
GLUCOSE SERPL-MCNC: 162 MG/DL (ref 70–105)
GLUCOSE SERPL-MCNC: 171 MG/DL (ref 70–105)
GLUCOSE SERPL-MCNC: 196 MG/DL (ref 70–105)

## 2023-01-22 PROCEDURE — 25000003 PHARM REV CODE 250: Performed by: HOSPITALIST

## 2023-01-22 PROCEDURE — 27000958

## 2023-01-22 PROCEDURE — 82962 GLUCOSE BLOOD TEST: CPT

## 2023-01-22 PROCEDURE — 11000004 HC SNF PRIVATE

## 2023-01-22 RX ADMIN — GABAPENTIN 100 MG: 100 CAPSULE ORAL at 08:01

## 2023-01-22 RX ADMIN — GABAPENTIN 100 MG: 100 CAPSULE ORAL at 10:01

## 2023-01-22 RX ADMIN — FINASTERIDE 5 MG: 5 TABLET, FILM COATED ORAL at 10:01

## 2023-01-22 RX ADMIN — SULFAMETHOXAZOLE AND TRIMETHOPRIM 1 TABLET: 800; 160 TABLET ORAL at 10:01

## 2023-01-22 RX ADMIN — SULFAMETHOXAZOLE AND TRIMETHOPRIM 1 TABLET: 800; 160 TABLET ORAL at 08:01

## 2023-01-22 RX ADMIN — DORZOLAMIDE HYDROCHLORIDE AND TIMOLOL MALEATE 1 DROP: 20; 5 SOLUTION/ DROPS OPHTHALMIC at 02:01

## 2023-01-22 RX ADMIN — DORZOLAMIDE HYDROCHLORIDE AND TIMOLOL MALEATE 1 DROP: 20; 5 SOLUTION/ DROPS OPHTHALMIC at 10:01

## 2023-01-22 RX ADMIN — DORZOLAMIDE HYDROCHLORIDE AND TIMOLOL MALEATE 1 DROP: 20; 5 SOLUTION/ DROPS OPHTHALMIC at 08:01

## 2023-01-22 RX ADMIN — BRIMONIDINE TARTRATE 1 DROP: 2 SOLUTION/ DROPS OPHTHALMIC at 05:01

## 2023-01-22 RX ADMIN — TAMSULOSIN HYDROCHLORIDE 0.8 MG: 0.4 CAPSULE ORAL at 10:01

## 2023-01-22 RX ADMIN — AMLODIPINE BESYLATE 5 MG: 5 TABLET ORAL at 10:01

## 2023-01-22 RX ADMIN — BRIMONIDINE TARTRATE 1 DROP: 2 SOLUTION/ DROPS OPHTHALMIC at 09:01

## 2023-01-22 RX ADMIN — BRIMONIDINE TARTRATE 1 DROP: 2 SOLUTION/ DROPS OPHTHALMIC at 02:01

## 2023-01-22 NOTE — NURSING
Patient voided 40cc urine, notified ER NP, patient alert, call light within reach, urinal at bedside.

## 2023-01-22 NOTE — NURSING
Patient was c/o abd discomfort  there had been no urine out put for hours  abd was hard and distended and called C Froilan NP in the ER and got an order to reinsert mederos cath. After putting in 18 Fr mederos cath,  Pt tolerated  procedure well, we obtained 1500 cc of urine, pt had great relief and abd was soft an d flat.

## 2023-01-23 LAB
GLUCOSE SERPL-MCNC: 139 MG/DL (ref 70–105)
GLUCOSE SERPL-MCNC: 143 MG/DL (ref 70–105)
GLUCOSE SERPL-MCNC: 145 MG/DL (ref 70–105)

## 2023-01-23 PROCEDURE — 82962 GLUCOSE BLOOD TEST: CPT

## 2023-01-23 PROCEDURE — 97535 SELF CARE MNGMENT TRAINING: CPT

## 2023-01-23 PROCEDURE — 97110 THERAPEUTIC EXERCISES: CPT | Mod: CQ

## 2023-01-23 PROCEDURE — 25000003 PHARM REV CODE 250: Performed by: HOSPITALIST

## 2023-01-23 PROCEDURE — 99308 PR NURSING FAC CARE, SUBSEQ, MINOR COMPLIC: ICD-10-PCS | Mod: ,,, | Performed by: HOSPITALIST

## 2023-01-23 PROCEDURE — 99308 SBSQ NF CARE LOW MDM 20: CPT | Mod: ,,, | Performed by: HOSPITALIST

## 2023-01-23 PROCEDURE — 11000004 HC SNF PRIVATE

## 2023-01-23 PROCEDURE — 97116 GAIT TRAINING THERAPY: CPT | Mod: CQ

## 2023-01-23 PROCEDURE — 27000958

## 2023-01-23 RX ADMIN — SULFAMETHOXAZOLE AND TRIMETHOPRIM 1 TABLET: 800; 160 TABLET ORAL at 08:01

## 2023-01-23 RX ADMIN — BRIMONIDINE TARTRATE 1 DROP: 2 SOLUTION/ DROPS OPHTHALMIC at 05:01

## 2023-01-23 RX ADMIN — DORZOLAMIDE HYDROCHLORIDE AND TIMOLOL MALEATE 1 DROP: 20; 5 SOLUTION/ DROPS OPHTHALMIC at 03:01

## 2023-01-23 RX ADMIN — TAMSULOSIN HYDROCHLORIDE 0.8 MG: 0.4 CAPSULE ORAL at 08:01

## 2023-01-23 RX ADMIN — GABAPENTIN 100 MG: 100 CAPSULE ORAL at 08:01

## 2023-01-23 RX ADMIN — DORZOLAMIDE HYDROCHLORIDE AND TIMOLOL MALEATE 1 DROP: 20; 5 SOLUTION/ DROPS OPHTHALMIC at 08:01

## 2023-01-23 RX ADMIN — AMLODIPINE BESYLATE 5 MG: 5 TABLET ORAL at 08:01

## 2023-01-23 RX ADMIN — BRIMONIDINE TARTRATE 1 DROP: 2 SOLUTION/ DROPS OPHTHALMIC at 09:01

## 2023-01-23 RX ADMIN — BRIMONIDINE TARTRATE 1 DROP: 2 SOLUTION/ DROPS OPHTHALMIC at 03:01

## 2023-01-23 RX ADMIN — FINASTERIDE 5 MG: 5 TABLET, FILM COATED ORAL at 08:01

## 2023-01-23 NOTE — PROGRESS NOTES
[facility] Medicine  Progress Note    Patient Name: Jake Brown  MRN: [id number]  Patient Class: IP- Swing   Admission Date: 1/18/2023  Length of Stay: 5 days  Attending Physician: Doron Godwin DO  Primary Care Provider: Osvaldo Hodges MD        Subjective:     Principal Problem:Weakness    [facility] Admission Certification    I certify that skilled nursing facility services are required to be given on an inpatient basis due to the following required skilled nursing and/or skilled rehabilitation services on a continuing basis:    management & evaluation of a patient plan of care that requires skilled nursing or rehabilitation services    I estimate that the additional period of SNF inpatient care will be 1-21 days.    Plans for post SNF care are: Home Care  ______________________________________________________________________        HPI:  74yo BM with hx of CKD, HTN, DMII, admit to SWB s/p Sepsis and renal failure 2nd to UTI and dehydration.  He has had a mederos now due to retention.  Does not see a Urologist.  He is followed by Nephrologist.  He was evaluated and felt like he would benefit from SWB therapy.  H/H has trended down he denies any blood in stool.  No fevers or chills.  Doing well and participating.       Overview/Hospital Course:  1/20 Continues to do well.  Will try to remove mederos today and see if able to urinate on his own.  Encourage fluids.    1/21 1130 Called to room by Pt Nurse Ambrocio, who reports pt c/o bladder distention.  States pt is voiding 30-40 ml every hour or so.  Straight cath for residual with 1300 ml output.  Pt reports relief.  Will continue to monitor strict I & O  1/21 2130 called by TERRIE Light LPN, pt c/o bladder distention again, has only voided 40 ml since last straight cath.  Pt reports is trying to go but cannot.  Will replace mederos.    1/23 Mederos placed back with 3L or UOP.  Will  keep until Urology follow up. Otherwise doing ok,  Working with therapy.       Interval History: mederos will remain, continue therapy.     Review of Systems   Respiratory:  Negative for shortness of breath.    Cardiovascular:  Negative for chest pain.   Gastrointestinal:  Negative for abdominal pain, nausea and vomiting.   Genitourinary:  Positive for difficulty urinating.   Psychiatric/Behavioral:  Negative for agitation and confusion.    All other systems reviewed and are negative.  Objective:     Vital Signs (Most Recent):  Temp: 97.7 °F (36.5 °C) (01/23/23 0712)  Pulse: 73 (01/23/23 0712)  Resp: 18 (01/23/23 0712)  BP: 134/63 (01/23/23 0712)  SpO2: 98 % (01/23/23 0712) Vital Signs (24h Range):  Temp:  [97.7 °F (36.5 °C)-98.1 °F (36.7 °C)] 97.7 °F (36.5 °C)  Pulse:  [73-94] 73  Resp:  [18-20] 18  SpO2:  [96 %-98 %] 98 %  BP: (134-152)/(63-84) 134/63     Weight: 81.7 kg (180 lb 1.9 oz)  Body mass index is 26.6 kg/m².    Intake/Output Summary (Last 24 hours) at 1/23/2023 0950  Last data filed at 1/23/2023 0512  Gross per 24 hour   Intake 940 ml   Output 2925 ml   Net -1985 ml      Physical Exam  Vitals reviewed.   Constitutional:       Appearance: Normal appearance.   HENT:      Mouth/Throat:      Mouth: Mucous membranes are dry.   Eyes:      Extraocular Movements: Extraocular movements intact.      Pupils: Pupils are equal, round, and reactive to light.      Comments: Exopthalmus   Cardiovascular:      Rate and Rhythm: Normal rate and regular rhythm.   Pulmonary:      Effort: Pulmonary effort is normal. No respiratory distress.      Breath sounds: Normal breath sounds. No wheezing.   Abdominal:      General: Bowel sounds are normal. There is no distension.      Palpations: Abdomen is soft.      Tenderness: There is no abdominal tenderness.   Genitourinary:     Comments: + Mederos   Musculoskeletal:         General: Normal range of motion.      Cervical back: Neck supple.   Skin:     General: Skin is warm and dry.       Coloration: Skin is not jaundiced.   Neurological:      General: No focal deficit present.      Mental Status: He is alert and oriented to person, place, and time. Mental status is at baseline.   Psychiatric:         Mood and Affect: Mood normal.         Thought Content: Thought content normal.       Significant Labs: All pertinent labs within the past 24 hours have been reviewed.  Recent Lab Results         01/23/23  0535   01/22/23  2056   01/22/23  1723   01/22/23  1122        POC Glucose 145   196   162   171               Significant Imaging: I have reviewed all pertinent imaging results/findings within the past 24 hours.      Assessment/Plan:      * Weakness  PT/OT and monitor.       Acute on chronic blood loss anemia  Could be renal related.  Occult stool neg.  Likely CKD related.  Anemia panel pending.       Benign prostatic hyperplasia without lower urinary tract symptoms  Continue current meds.  Needs Urology referral. Bai to remain.     Hyperthyroidism  Monitor TSH, was not on meds on arrival for treatment.       CHF (congestive heart failure)  Unknown type.  His fluid status seems stable.        CKD (chronic kidney disease)  Following numbers.  Slight trend up.  Will encourage fluids.  Avoid nephrotoxic drugs.       Essential hypertension    Currently on just Norvasc.  Stopped ACE due to renal issues.     Diabetes  Patient's FSGs are controlled on current medication regimen.  Last A1c reviewed-   Lab Results   Component Value Date    HGBA1C 6.2 01/11/2023     Most recent fingerstick glucose reviewed- No results for input(s): POCTGLUCOSE in the last 24 hours.  Current correctional scale  Low  Maintain anti-hyperglycemic dose as follows-   Antihyperglycemics (From admission, onward)    Start     Stop Route Frequency Ordered    01/18/23 1800  insulin aspart U-100 injection 0-5 Units         -- SubQ Before meals & nightly PRN 01/18/23 1657        Hold Oral hypoglycemics while patient is in the  hospital.      VTE Risk Mitigation (From admission, onward)         Ordered     IP VTE HIGH RISK PATIENT  Once         01/18/23 1306                Discharge Planning   KARINA:      Code Status: Full Code   Is the patient medically ready for discharge?:     Reason for patient still in hospital (select all that apply): Patient trending condition and PT / OT recommendations  Discharge Plan A: Home with family, Home Health                  Doron Godwin DO  Department of Hospital Medicine   [facility]

## 2023-01-23 NOTE — PLAN OF CARE
Problem: Adult Inpatient Plan of Care  Goal: Plan of Care Review  Outcome: Ongoing, Progressing  Goal: Patient-Specific Goal (Individualized)  Outcome: Ongoing, Progressing  Goal: Absence of Hospital-Acquired Illness or Injury  Outcome: Ongoing, Progressing  Goal: Optimal Comfort and Wellbeing  Outcome: Ongoing, Progressing  Goal: Readiness for Transition of Care  Outcome: Ongoing, Progressing     Problem: Diabetes Comorbidity  Goal: Blood Glucose Level Within Targeted Range  Outcome: Ongoing, Progressing     Problem: Infection  Goal: Absence of Infection Signs and Symptoms  Outcome: Ongoing, Progressing     Problem: Skin Injury Risk Increased  Goal: Skin Health and Integrity  Outcome: Ongoing, Progressing     Problem: Urine Elimination Impaired (Urinary Diversion)  Goal: Effective Urinary Elimination  Outcome: Ongoing, Progressing

## 2023-01-23 NOTE — PT/OT/SLP PROGRESS
Physical Therapy Treatment    Patient Name:  Jake Brown   MRN:  [id number]    Recommendations:     Discharge Recommendations: home  Discharge Equipment Recommendations: none  Barriers to discharge: Inaccessible home    Assessment:     Jake Brown is a 73 y.o. male admitted with a medical diagnosis of Weakness.  He presents with the following impairments/functional limitations: weakness, gait instability, impaired balance, impaired functional mobility. Patient able to increase gait distance this date. Patient required occasional rest breaks while performing therapeutic exercises. Patient with no reports of adverse effects after completion of treatment.     Rehab Prognosis: Good; patient would benefit from acute skilled PT services to address these deficits and reach maximum level of function.    Recent Surgery: * No surgery found *      Plan:     During this hospitalization, patient to be seen 5 x/week to address the identified rehab impairments via gait training, therapeutic activities, therapeutic exercises and progress toward the following goals:    Plan of Care Expires:  02/03/23    Subjective     Chief Complaint: Patient complained of catheter pain as described below.   Patient/Family Comments/goals: pt to dc home upon dc from hosp  Pain/Comfort:  Pain Rating 1: other (see comments) (Pt stated that he had pain from his catheter, but he stated that he notified nursing and the doctor.)      Objective:     Communicated with pt prior to session. Patient found up in chair with mederos catheter upon PTA entry to room.     General Precautions: Standard, fall  Orthopedic Precautions:    Braces:    Respiratory Status: Room air     Functional Mobility:  Transfers:     Sit to Stand:  stand by assistance and contact guard assistance with rolling walker  Gait: Pt ambulated 265 feet with RW and CGA.      AM-PAC 6 CLICK MOBILITY  Turning over in bed (including adjusting bedclothes, sheets and blankets)?: 4  Sitting down on and  standing up from a chair with arms (e.g., wheelchair, bedside commode, etc.): 4  Moving from lying on back to sitting on the side of the bed?: 4  Moving to and from a bed to a chair (including a wheelchair)?: 3  Need to walk in hospital room?: 3  Climbing 3-5 steps with a railing?: 1  Basic Mobility Total Score: 19       Treatment & Education:  Patient performed the following therapeutic exercises BLE 15x each: standing marching, mini squats, heel raises, sit to stand 5 x 3, hip ADD squeezes     Patient left up in chair with call button in reach and guest present.    GOALS:   Multidisciplinary Problems       Physical Therapy Goals          Problem: Physical Therapy    Goal Priority Disciplines Outcome Goal Variances Interventions   Physical Therapy Goal     PT, PT/OT Ongoing, Progressing     Description: LTG - 2 weeks  1. Pt will have 4/5 BLE strength.  2. Pt  with STS and bed transfers.  3. Pt SBA to negotiated stairs with rail.  4. Pt will amb with appropriate AD x 250 ft with svn level surfaces and SBA unlevel surfaces.  5. Pt indep with bed mob.                       Time Tracking:     PT Received On: 01/23/23  PT Start Time: 1055     PT Stop Time: 1120  PT Total Time (min): 25 min     Billable Minutes: Gait Training 8 and Therapeutic Exercise 17    Treatment Type: Treatment  PT/PTA: PTA     PTA Visit Number: 2     01/23/2023

## 2023-01-23 NOTE — SUBJECTIVE & OBJECTIVE
Interval History: mederos will remain, continue therapy.     Review of Systems   Respiratory:  Negative for shortness of breath.    Cardiovascular:  Negative for chest pain.   Gastrointestinal:  Negative for abdominal pain, nausea and vomiting.   Genitourinary:  Positive for difficulty urinating.   Psychiatric/Behavioral:  Negative for agitation and confusion.    All other systems reviewed and are negative.  Objective:     Vital Signs (Most Recent):  Temp: 97.7 °F (36.5 °C) (01/23/23 0712)  Pulse: 73 (01/23/23 0712)  Resp: 18 (01/23/23 0712)  BP: 134/63 (01/23/23 0712)  SpO2: 98 % (01/23/23 0712) Vital Signs (24h Range):  Temp:  [97.7 °F (36.5 °C)-98.1 °F (36.7 °C)] 97.7 °F (36.5 °C)  Pulse:  [73-94] 73  Resp:  [18-20] 18  SpO2:  [96 %-98 %] 98 %  BP: (134-152)/(63-84) 134/63     Weight: 81.7 kg (180 lb 1.9 oz)  Body mass index is 26.6 kg/m².    Intake/Output Summary (Last 24 hours) at 1/23/2023 0950  Last data filed at 1/23/2023 0512  Gross per 24 hour   Intake 940 ml   Output 2925 ml   Net -1985 ml      Physical Exam  Vitals reviewed.   Constitutional:       Appearance: Normal appearance.   HENT:      Mouth/Throat:      Mouth: Mucous membranes are dry.   Eyes:      Extraocular Movements: Extraocular movements intact.      Pupils: Pupils are equal, round, and reactive to light.      Comments: Exopthalmus   Cardiovascular:      Rate and Rhythm: Normal rate and regular rhythm.   Pulmonary:      Effort: Pulmonary effort is normal. No respiratory distress.      Breath sounds: Normal breath sounds. No wheezing.   Abdominal:      General: Bowel sounds are normal. There is no distension.      Palpations: Abdomen is soft.      Tenderness: There is no abdominal tenderness.   Genitourinary:     Comments: + Mederos   Musculoskeletal:         General: Normal range of motion.      Cervical back: Neck supple.   Skin:     General: Skin is warm and dry.      Coloration: Skin is not jaundiced.   Neurological:      General: No focal  deficit present.      Mental Status: He is alert and oriented to person, place, and time. Mental status is at baseline.   Psychiatric:         Mood and Affect: Mood normal.         Thought Content: Thought content normal.       Significant Labs: All pertinent labs within the past 24 hours have been reviewed.  Recent Lab Results         01/23/23  0535   01/22/23  2056   01/22/23  1723   01/22/23  1122        POC Glucose 145   196   162   171               Significant Imaging: I have reviewed all pertinent imaging results/findings within the past 24 hours.

## 2023-01-23 NOTE — PT/OT/SLP PROGRESS
Occupational Therapy   Treatment    Name: Jake Brown  MRN: [id number]  Admitting Diagnosis:  Weakness       Recommendations:     Discharge Recommendations: home health OT, home with home health  Discharge Equipment Recommendations:  walker, rolling  Barriers to discharge:  None    Assessment:     Jake Brown is a 73 y.o. male with a medical diagnosis of Weakness.  He presents with no new complaints other than discouragement that he had to have catheter put back in over the weekend due to urinary retention unresolved. Performance deficits affecting function are weakness, impaired endurance, gait instability, impaired balance.     Rehab Prognosis:  Good; patient would benefit from acute skilled OT services to address these deficits and reach maximum level of function.       Plan:     Patient to be seen 5 x/week to address the above listed problems via self-care/home management, therapeutic activities, therapeutic exercises  Plan of Care Expires: 01/27/23  Plan of Care Reviewed with: patient    Subjective     Pain/Comfort:       Objective:     Communicated with: pt prior to session.  Patient found up in chair with mederos catheter   upon OT entry to room.    General Precautions: Standard, fall, diabetic    Orthopedic Precautions:   Braces: N/A  Respiratory Status: Room air     Occupational Performance:       Activities of Daily Living:  OT educated pt on how to thread catheter bag through pant leg so that he will be able to do this I'ly at home upon d/c.  Pt is approaching d/c time frame to home due to max potential and adequate for transition to home with home health.  He reports he needed some help getting catheter through his pant leg this morning due to he put it through wrong side up through pants.  OT discussed once again with pt instructing him on correct way to thread.  About this time, pt had visitor come in, so OT will cont education and have pt go through teach back likely tomorrow.      [facility] 6 Click ADL:       Treatment & Education:  Session as noted above regarding education on mederos catheter and LB dressing methods.    Patient left up in chair with  visitor present    GOALS:   Multidisciplinary Problems       Occupational Therapy Goals          Problem: Occupational Therapy    Goal Priority Disciplines Outcome Interventions   Occupational Therapy Goal     OT, PT/OT Ongoing, Progressing    Description: STG:    Pt will increase UB strength to WFL for functional tasks including using AD safely and effectively  Pt will improve endurance to good for functional tasks including self care  Pt will tolerate 30 minutes of tx without fatigue      LT.Restore to max I with self care and mobility.                          Time Tracking:     OT Date of Treatment: 23  OT Start Time: 1320  OT Stop Time: 1330  OT Total Time (min): 10 min    Billable Minutes:Self Care/Home Management 10    OT/ALINA: OT          2023

## 2023-01-23 NOTE — PROGRESS NOTES
Clinical Pharmacy Chart Review Note      Admit Date: 1/18/2023   LOS: 5 days       Jake Brown is a 73 y.o. male admitted to SNF for PT/OT after hospitalization for sepsis and renal failure.    Active Hospital Problems    Diagnosis  POA    *Weakness [R53.1]  Yes    Acute on chronic blood loss anemia [D62]  No    Benign prostatic hyperplasia without lower urinary tract symptoms [N40.0]  Yes    Hyperthyroidism [E05.90]  Yes    CHF (congestive heart failure) [I50.9]  Yes    CKD (chronic kidney disease) [N18.9]  Yes    Diabetes [E11.9]  Yes    Essential hypertension [I10]  Yes      Resolved Hospital Problems   No resolved problems to display.     Review of patient's allergies indicates:  No Known Allergies  Patient Active Problem List    Diagnosis Date Noted    Acute on chronic blood loss anemia 01/19/2023    Urinary retention due to benign prostatic hyperplasia 01/12/2023    Weakness 03/01/2022    Allergic rhinitis 03/01/2022    Benign prostatic hyperplasia without lower urinary tract symptoms 03/01/2022    Gout of multiple sites 03/01/2022    Hyperthyroidism 08/24/2021    CKD (chronic kidney disease) 06/09/2021    CHF (congestive heart failure) 06/09/2021    GERD (gastroesophageal reflux disease) 06/09/2021    Diabetes     Essential hypertension        Scheduled Meds:    amLODIPine  5 mg Oral Daily    brimonidine 0.2%  1 drop Both Eyes Q8H    dorzolamide-timolol 2-0.5%  1 drop Both Eyes TID    finasteride  5 mg Oral Daily    gabapentin  100 mg Oral BID    sulfamethoxazole-trimethoprim 800-160mg  1 tablet Oral BID    tamsulosin  0.8 mg Oral Daily     Continuous Infusions:   PRN Meds: acetaminophen, aluminum-magnesium hydroxide-simethicone, calcium carbonate, dextrose 50%, glucagon (human recombinant), insulin aspart U-100, magnesium hydroxide 400 mg/5 ml, melatonin, ondansetron, senna-docusate 8.6-50 mg, sodium chloride 0.9%    OBJECTIVE:     Vital Signs (Last 24H)  Temp:  [97.7 °F (36.5 °C)-98.1 °F (36.7 °C)]    Pulse:  [73-94]   Resp:  [18-20]   BP: (134-152)/(63-84)   SpO2:  [96 %-98 %]     Laboratory:  CBC:   Recent Labs   Lab 01/19/23  0627 01/20/23  1044   WBC 7.62 7.32   RBC 2.37* 2.76*   HGB 7.4* 8.5*   HCT 21.6* 25.3*    319   MCV 91.1 91.7   MCH 31.2* 30.8   MCHC 34.3 33.6     BMP:   Recent Labs   Lab 01/18/23  0507 01/19/23  0627 01/20/23  0530   * 144* 144*    136 137   K 4.2 4.5 4.9    104 105   CO2 28 25 26   BUN 31* 31* 28*   CREATININE 1.84* 1.94* 1.95*   CALCIUM 8.2* 8.1* 8.9     .    ASSESSMENT/PLAN:     Estimated Creatinine Clearance: 33.2 mL/min (A) (based on SCr of 1.95 mg/dL (H)).Medications reviewed, no dose adjustments needed. Culture appropriate abx complete on 01-.  Weekly swing bed medication regimen review complete.  Will continue to monitor.  Norberto Braswell, Pharm. D.  Director of Pharmacy  [facility]  [phone]  [email or web address]

## 2023-01-24 LAB
GLUCOSE SERPL-MCNC: 121 MG/DL (ref 70–105)
GLUCOSE SERPL-MCNC: 155 MG/DL (ref 70–105)
GLUCOSE SERPL-MCNC: 183 MG/DL (ref 70–105)
GLUCOSE SERPL-MCNC: 225 MG/DL (ref 70–105)

## 2023-01-24 PROCEDURE — 82962 GLUCOSE BLOOD TEST: CPT

## 2023-01-24 PROCEDURE — 63600175 PHARM REV CODE 636 W HCPCS: Performed by: HOSPITALIST

## 2023-01-24 PROCEDURE — 97110 THERAPEUTIC EXERCISES: CPT | Mod: CQ

## 2023-01-24 PROCEDURE — 11000004 HC SNF PRIVATE

## 2023-01-24 PROCEDURE — 25000003 PHARM REV CODE 250: Performed by: HOSPITALIST

## 2023-01-24 PROCEDURE — 97116 GAIT TRAINING THERAPY: CPT | Mod: CQ

## 2023-01-24 PROCEDURE — 27000958

## 2023-01-24 RX ADMIN — DORZOLAMIDE HYDROCHLORIDE AND TIMOLOL MALEATE 1 DROP: 20; 5 SOLUTION/ DROPS OPHTHALMIC at 03:01

## 2023-01-24 RX ADMIN — BRIMONIDINE TARTRATE 1 DROP: 2 SOLUTION/ DROPS OPHTHALMIC at 01:01

## 2023-01-24 RX ADMIN — BRIMONIDINE TARTRATE 1 DROP: 2 SOLUTION/ DROPS OPHTHALMIC at 09:01

## 2023-01-24 RX ADMIN — AMLODIPINE BESYLATE 5 MG: 5 TABLET ORAL at 08:01

## 2023-01-24 RX ADMIN — GABAPENTIN 100 MG: 100 CAPSULE ORAL at 08:01

## 2023-01-24 RX ADMIN — GABAPENTIN 100 MG: 100 CAPSULE ORAL at 09:01

## 2023-01-24 RX ADMIN — SULFAMETHOXAZOLE AND TRIMETHOPRIM 1 TABLET: 800; 160 TABLET ORAL at 09:01

## 2023-01-24 RX ADMIN — DORZOLAMIDE HYDROCHLORIDE AND TIMOLOL MALEATE 1 DROP: 20; 5 SOLUTION/ DROPS OPHTHALMIC at 09:01

## 2023-01-24 RX ADMIN — TAMSULOSIN HYDROCHLORIDE 0.8 MG: 0.4 CAPSULE ORAL at 08:01

## 2023-01-24 RX ADMIN — INSULIN ASPART 1 UNITS: 100 INJECTION, SOLUTION INTRAVENOUS; SUBCUTANEOUS at 09:01

## 2023-01-24 RX ADMIN — BRIMONIDINE TARTRATE 1 DROP: 2 SOLUTION/ DROPS OPHTHALMIC at 05:01

## 2023-01-24 RX ADMIN — FINASTERIDE 5 MG: 5 TABLET, FILM COATED ORAL at 08:01

## 2023-01-24 RX ADMIN — SULFAMETHOXAZOLE AND TRIMETHOPRIM 1 TABLET: 800; 160 TABLET ORAL at 08:01

## 2023-01-24 RX ADMIN — DORZOLAMIDE HYDROCHLORIDE AND TIMOLOL MALEATE 1 DROP: 20; 5 SOLUTION/ DROPS OPHTHALMIC at 08:01

## 2023-01-24 NOTE — PLAN OF CARE
Problem: Diabetes Comorbidity  Goal: Blood Glucose Level Within Targeted Range  Outcome: Ongoing, Progressing     Problem: Infection  Goal: Absence of Infection Signs and Symptoms  Outcome: Ongoing, Progressing     Problem: Urine Elimination Impaired (Urinary Diversion)  Goal: Effective Urinary Elimination  Outcome: Ongoing, Progressing

## 2023-01-24 NOTE — PLAN OF CARE
[facility]  Patient: Jake Brown     Interdisciplinary Team Meeting     Today's Date: 1/24/2023     Estimated D/C Date: TBD       Primary Physician:  Dr Hodges    Pharmacy: [facility] [place] Unit Director: Mónica Grissom   Care Management: Valente Stevens Physical/Occupational Therapy: Isabella Stratton/Alexa Condon   Speech Therapy: Patt Luna Activity Therapy: Yani Light    Dietician: Kiana Mercedes  Pharmacist: Norberto Braswell  Respiratory therapist: Kecia Benítez     Nurse  New Symptoms/Problems: none  Last BM: 1/23/2023 Urine: continent Diarrhea: No   Constipated: No Bowel: continent Bai: YES   Isolation: No     O2: room air     Nutrition: diabetic diet  Speech/Swallowing: not following  Aspiration Precautions: No  Cognition: alert and oriented to person, place, time and situation    Physical Therapy  Gait: 265+ ft with RW and SBA ELOS: TBD   Transfers: min assist Range of Motion/Restrictions: none     Occupational Therapy   Eating/Grooming: SBA   Toileting: SBA Bathing: SBA   Dressing (Upper Body): SBA Dressing (Lower Body): SBA       Tx Plan/Recommendations reviewed with family and patient on (date) 1/24/2023 by phone.  Additional family Conference/Training: n/a  D/C Plan/Recommendations: home with home health    Pharmacy  Medication Changes (see MD orders in chart): No  MD/NP: Doron Godwin Labs Reviewed: 1/24/2023 New Lab Orders: n/a     MD/NP Signature: Time:

## 2023-01-24 NOTE — PT/OT/SLP PROGRESS
"Occupational Therapy      Patient Name:  Jake Brown   MRN:  [id number]    Patient not seen today secondary to Patient unwilling to participate (pt pleasantly declined offer of OT tx this afternoon, stating, "we'll do it in the morning."). Will follow-up tomorrow.    1/24/2023  "

## 2023-01-24 NOTE — PT/OT/SLP PROGRESS
Physical Therapy Treatment    Patient Name:  Jake Brown   MRN:  [id number]    Recommendations:     Discharge Recommendations: home  Discharge Equipment Recommendations: none  Barriers to discharge: Inaccessible home    Assessment:     Jake Brown is a 73 y.o. male admitted with a medical diagnosis of Weakness.  He presents with the following impairments/functional limitations: weakness, gait instability, impaired balance, impaired functional mobility. Patient able to increase gait distance this date. Patient required occasional rest breaks while performing therapeutic exercises due to noted fatigue. Patient with no reports of adverse effects after completion of treatment. Patient left seated in wc and he stated he needed to use the rest room but he stated he did not need assistance, so OT and PTA waited outside door until patient went into the restroom.     Rehab Prognosis: Good; patient would benefit from acute skilled PT services to address these deficits and reach maximum level of function.    Recent Surgery: * No surgery found *      Plan:     During this hospitalization, patient to be seen 5 x/week to address the identified rehab impairments via gait training, therapeutic activities, therapeutic exercises and progress toward the following goals:    Plan of Care Expires:  02/03/23    Subjective     Chief Complaint: Patient complained of soreness from catheter, and soreness all over when he coughs.   Patient/Family Comments/goals: pt to dc home upon dc from hosp  Pain/Comfort:  Pain Rating 1: 0/10      Objective:     Communicated with pt prior to session. Patient found up in chair with mederos catheter upon PTA entry to room.     General Precautions: Standard, fall  Orthopedic Precautions:    Braces:    Respiratory Status: Room air     Functional Mobility:  Transfers:     Sit to Stand:  stand by assistance and contact guard assistance with rolling walker  Gait: Pt ambulated 265 feet + 265 feet with RW and  SBA/CGA.      AM-PAC 6 CLICK MOBILITY  Turning over in bed (including adjusting bedclothes, sheets and blankets)?: 4  Sitting down on and standing up from a chair with arms (e.g., wheelchair, bedside commode, etc.): 4  Moving from lying on back to sitting on the side of the bed?: 4  Moving to and from a bed to a chair (including a wheelchair)?: 3  Need to walk in hospital room?: 3  Climbing 3-5 steps with a railing?: 1  Basic Mobility Total Score: 19       Treatment & Education:  Patient performed the following therapeutic exercises BLE 15x each: standing marching, mini squats, heel raises, sit to stand 5 x 2  Patient left  in bathroom  with call button in reach.    GOALS:   Multidisciplinary Problems       Physical Therapy Goals          Problem: Physical Therapy    Goal Priority Disciplines Outcome Goal Variances Interventions   Physical Therapy Goal     PT, PT/OT Ongoing, Progressing     Description: LTG - 2 weeks  1. Pt will have 4/5 BLE strength.  2. Pt  with STS and bed transfers.  3. Pt SBA to negotiated stairs with rail.  4. Pt will amb with appropriate AD x 250 ft with svn level surfaces and SBA unlevel surfaces.  5. Pt indep with bed mob.                       Time Tracking:     PT Received On: 01/24/23  PT Start Time: 1249     PT Stop Time: 1315  PT Total Time (min): 26 min     Billable Minutes: Gait Training 12 and Therapeutic Exercise 14    Treatment Type: Treatment  PT/PTA: PTA     PTA Visit Number: 3     01/24/2023

## 2023-01-25 LAB
GLUCOSE SERPL-MCNC: 127 MG/DL (ref 70–105)
GLUCOSE SERPL-MCNC: 154 MG/DL (ref 70–105)
GLUCOSE SERPL-MCNC: 184 MG/DL (ref 70–105)
GLUCOSE SERPL-MCNC: 192 MG/DL (ref 70–105)

## 2023-01-25 PROCEDURE — 82962 GLUCOSE BLOOD TEST: CPT

## 2023-01-25 PROCEDURE — 97116 GAIT TRAINING THERAPY: CPT | Mod: CQ

## 2023-01-25 PROCEDURE — 11000004 HC SNF PRIVATE

## 2023-01-25 PROCEDURE — 97110 THERAPEUTIC EXERCISES: CPT | Mod: CQ

## 2023-01-25 PROCEDURE — 27000958

## 2023-01-25 PROCEDURE — 97535 SELF CARE MNGMENT TRAINING: CPT

## 2023-01-25 PROCEDURE — 25000003 PHARM REV CODE 250: Performed by: HOSPITALIST

## 2023-01-25 RX ADMIN — TAMSULOSIN HYDROCHLORIDE 0.8 MG: 0.4 CAPSULE ORAL at 08:01

## 2023-01-25 RX ADMIN — DORZOLAMIDE HYDROCHLORIDE AND TIMOLOL MALEATE 1 DROP: 20; 5 SOLUTION/ DROPS OPHTHALMIC at 09:01

## 2023-01-25 RX ADMIN — AMLODIPINE BESYLATE 5 MG: 5 TABLET ORAL at 08:01

## 2023-01-25 RX ADMIN — BRIMONIDINE TARTRATE 1 DROP: 2 SOLUTION/ DROPS OPHTHALMIC at 06:01

## 2023-01-25 RX ADMIN — DORZOLAMIDE HYDROCHLORIDE AND TIMOLOL MALEATE 1 DROP: 20; 5 SOLUTION/ DROPS OPHTHALMIC at 08:01

## 2023-01-25 RX ADMIN — GABAPENTIN 100 MG: 100 CAPSULE ORAL at 08:01

## 2023-01-25 RX ADMIN — DORZOLAMIDE HYDROCHLORIDE AND TIMOLOL MALEATE 1 DROP: 20; 5 SOLUTION/ DROPS OPHTHALMIC at 02:01

## 2023-01-25 RX ADMIN — BRIMONIDINE TARTRATE 1 DROP: 2 SOLUTION/ DROPS OPHTHALMIC at 09:01

## 2023-01-25 RX ADMIN — BRIMONIDINE TARTRATE 1 DROP: 2 SOLUTION/ DROPS OPHTHALMIC at 02:01

## 2023-01-25 RX ADMIN — GABAPENTIN 100 MG: 100 CAPSULE ORAL at 09:01

## 2023-01-25 RX ADMIN — FINASTERIDE 5 MG: 5 TABLET, FILM COATED ORAL at 08:01

## 2023-01-25 NOTE — CONSULTS
"  [facility] - Medical Surgical Unit  Adult Nutrition  Consult Note    SUMMARY     Recommendations    Recommendation/Intervention: Recommend addition of Renal restriction to current diet order given PMH of CKD. Encourage continued good PO intakes.  Goals: consume % of meals, tolerate PO intake, weight maintenance  Nutrition Goal Status: new    Assessment and Plan  Consult received and appreciated.     Per MD: "72yo BM with hx of CKD, HTN, DMII, admit to SWB s/p Sepsis and renal failure 2nd to UTI and dehydration.  He has had a mederos now due to retention.  Does not see a Urologist.  He is followed by Nephrologist.  He was evaluated and felt like he would benefit from SWB therapy.  H/H has trended down he denies any blood in stool.  No fevers or chills.  Doing well and participating.   Overview/Hospital Course:  1/20 Continues to do well.  Will try to remove mederos today and see if able to urinate on his own.  Encourage fluids.    1/21 1130 Called to room by Pt Nurse Ambrocio, who reports pt c/o bladder distention.  States pt is voiding 30-40 ml every hour or so.  Straight cath for residual with 1300 ml output.  Pt reports relief.  Will continue to monitor strict I & O  1/21 2130 called by TERRIE Light LPN, pt c/o bladder distention again, has only voided 40 ml since last straight cath.  Pt reports is trying to go but cannot.  Will replace mederos.  1/23 Mederos placed back with 3L or UOP.  Will keep until Urology follow up. Otherwise doing ok,  Working with therapy."    Current weight is above IBW and BMI considered WNL per geriatric guidelines.     Pt is currently receiving a Diabetic diet. Recommend addition of Renal restriction given PMH of CKD. Per flowsheets. Pt consuming % of meals. Intake is adequate to meet nutritional needs. Continue good PO intakes.     Last BM 1/23 per flowsheets. Labs/meds reviewed. RD following.     Contributing Nutrition Diagnosis  altered nutrition related lab " "value    Related to (etiology):   Diabeets, CKD    Signs and Symptoms (as evidenced by):   Elevated glucose. Elevated BUN, Cr and low GFR      Interventions/Recommendations (treatment strategy):  Continue diabetic restriction. Recommend Renal restriction to current diet order    Nutrition Diagnosis Status:   New      Reason for Assessment    Reason For Assessment: consult  Diagnosis: other (see comments) (weakness)  Relevant Medical History: DM, HTN, CHF    Nutrition Risk Screen    Nutrition Risk Screen: no indicators present    Nutrition/Diet History    Spiritual, Cultural Beliefs, Adventist Practices, Values that Affect Care: no    Anthropometrics    Temp: 98 °F (36.7 °C)  Height Method: Stated  Height: 5' 9" (175.3 cm)  Height (inches): 69 in  Weight Method: Bed Scale  Weight: 81.7 kg (180 lb 1.9 oz)  Weight (lb): 180.12 lb  Ideal Body Weight (IBW), Male: 160 lb  % Ideal Body Weight, Male (lb): 112.58 %  BMI (Calculated): 26.6       Lab/Procedures/Meds   Latest Reference Range & Units 01/20/23 05:30   Sodium 136 - 145 mmol/L 137   Potassium 3.5 - 5.1 mmol/L 4.9   Chloride 98 - 107 mmol/L 105   CO2 21 - 32 mmol/L 26   Anion Gap 7 - 16 mmol/L 11   BUN 7 - 18 mg/dL 28 (H)   Creatinine 0.70 - 1.30 mg/dL 1.95 (H)   BUN/CREAT RATIO 6 - 20  14   eGFR >=60 mL/min/1.73m² 35 (L)   Glucose 74 - 106 mg/dL 144 (H)   Calcium 8.5 - 10.1 mg/dL 8.9   (H): Data is abnormally high  (L): Data is abnormally low    Note: Elevated BUN, Cr and low GFR -  PMH of CKD. Elevated glucose - PMH of DM.     Pertinent Labs Reviewed: reviewed  Pertinent Medications Reviewed: reviewed  Pertinent Medications Comments: amlodipine, finasteride, gabapentin, sulfamethoxazole, tamsulosin    Estimated/Assessed Needs    Weight Used For Calorie Calculations: 81.7 kg (180 lb 1.9 oz)  Energy Calorie Requirements (kcal): 2024-8257 kcals/day (25-30 kcals/kg)     Protein Requirements: 66-82 g/day (0.8-1.0 g/kg)  Weight Used For Protein Calculations: 81.7 kg " (180 lb 1.9 oz)  Fluid Requirements (mL): 1-2 L/day (CHF) or per MD     RDA Method (mL): 2043         Nutrition Prescription Ordered    Current Diet Order: Diabetic diet  Nutrition Order Comments: appropriate    Evaluation of Received Nutrient/Fluid Intake       % Intake of Estimated Energy Needs: 75 - 100 %  % Meal Intake: 75 - 100 %    Nutrition Risk    Level of Risk/Frequency of Follow-up: moderate       Monitor and Evaluation      1. Monitor PO intake/tolerance  2. Monitor weight changes  3. Monitor labs/emds  4. Monitor POC      Nutrition Follow-Up    RD Follow-up?: Yes

## 2023-01-25 NOTE — PT/OT/SLP PROGRESS
Occupational Therapy   Treatment    Name: Jake Brown  MRN: [id number]  Admitting Diagnosis:  Weakness       Recommendations:     Discharge Recommendations: home health OT, home with home health  Discharge Equipment Recommendations:  walker, rolling  Barriers to discharge:  None    Assessment:     Jake Brown is a 73 y.o. male with a medical diagnosis of Weakness.  He presents with no new complaints. Performance deficits affecting function are weakness, impaired endurance, gait instability, impaired balance.     Rehab Prognosis:  Good; patient would benefit from acute skilled OT services to address these deficits and reach maximum level of function.       Plan:     Patient to be seen 5 x/week to address the above listed problems via self-care/home management, therapeutic activities, therapeutic exercises  Plan of Care Expires: 01/27/23  Plan of Care Reviewed with: patient    Subjective     Pain/Comfort:       Objective:     Communicated with: pt prior to session.  Patient found up in chair at sinkside performing morning self care routine with mederos catheter  upon OT entry to room.    General Precautions: Standard, fall, diabetic    Orthopedic Precautions:   Braces: N/A  Respiratory Status: Room air     Occupational Performance:     Functional Mobility/Transfers:  Patient completed Sit <> Stand Transfer with independence  with  no assistive device   Functional Mobility: pt did not perform any other functional mobility while with OT this morning    Activities of Daily Living:  Lower Body Dressing: supervision cues needed to get catheter threaded out of and into his new pj pants this morning.  Pt performed this with only VC's mainly to accomplish, though he is very slow in speed, he does get the task completed with svn only.      [facility] 6 Click ADL:      Treatment & Education:  ADL catheter mgmt during LB dressing as noted above.    Patient left up in chair with call button in reach and continuing his sinkside sponge  bath with mod I only    GOALS:   Multidisciplinary Problems       Occupational Therapy Goals          Problem: Occupational Therapy    Goal Priority Disciplines Outcome Interventions   Occupational Therapy Goal     OT, PT/OT Ongoing, Progressing    Description: STG:    Pt will increase UB strength to WFL for functional tasks including using AD safely and effectively  Pt will improve endurance to good for functional tasks including self care  Pt will tolerate 30 minutes of tx without fatigue      LT.Restore to max I with self care and mobility.                          Time Tracking:     OT Date of Treatment: 23  OT Start Time: 915  OT Stop Time: 930  OT Total Time (min): 15 min    Billable Minutes:Self Care/Home Management 15    OT/ALINA: OT          2023

## 2023-01-25 NOTE — PT/OT/SLP PROGRESS
Physical Therapy Treatment    Patient Name:  Jake Brown   MRN:  [id number]    Recommendations:     Discharge Recommendations: home  Discharge Equipment Recommendations: none  Barriers to discharge: Inaccessible home    Assessment:     Jake Brown is a 73 y.o. male admitted with a medical diagnosis of Weakness.  He presents with the following impairments/functional limitations: weakness, gait instability, impaired balance. Patient able to increase gait distance this date with no rest breaks. Patient with no reports of pain or adverse effects after completion of treatment.   Rehab Prognosis: Good; patient would benefit from acute skilled PT services to address these deficits and reach maximum level of function.    Recent Surgery: * No surgery found *      Plan:     During this hospitalization, patient to be seen 5 x/week to address the identified rehab impairments via gait training, therapeutic activities, therapeutic exercises and progress toward the following goals:    Plan of Care Expires:  02/03/23    Subjective     Chief Complaint: Patient with no complaints today.   Patient/Family Comments/goals: pt to dc home upon dc from hosp  Pain/Comfort:  Pain Rating 1: 0/10      Objective:     Communicated with CNA prior to session. Patient found up in chair with mederos catheter upon PTA entry to room.     General Precautions: Standard, fall  Orthopedic Precautions:    Braces:    Respiratory Status: Room air     Functional Mobility:  Transfers:     Sit to Stand:  supervision and stand by assistance with rolling walker  Toilet Transfer: modified independence with  no AD  using  Step Transfer  Gait: Pt ambulated 515 feet with RW and SBA/CGA.  Balance: Patient performed dynamic standing balance for dressing.      AM-PAC 6 CLICK MOBILITY  Turning over in bed (including adjusting bedclothes, sheets and blankets)?: 4  Sitting down on and standing up from a chair with arms (e.g., wheelchair, bedside commode, etc.): 4  Moving from  lying on back to sitting on the side of the bed?: 4  Moving to and from a bed to a chair (including a wheelchair)?: 3  Need to walk in hospital room?: 4  Climbing 3-5 steps with a railing?: 1  Basic Mobility Total Score: 20       Treatment & Education:  Patient performed the following therapeutic exercises BLE 15x each: standing marching, mini squats, heel raises, sit to stand 5 x 2  Patient left up in chair with call button in reach and CNA present.    GOALS:   Multidisciplinary Problems       Physical Therapy Goals          Problem: Physical Therapy    Goal Priority Disciplines Outcome Goal Variances Interventions   Physical Therapy Goal     PT, PT/OT Ongoing, Progressing     Description: LTG - 2 weeks  1. Pt will have 4/5 BLE strength.  2. Pt  with STS and bed transfers.  3. Pt SBA to negotiated stairs with rail.  4. Pt will amb with appropriate AD x 250 ft with svn level surfaces and SBA unlevel surfaces.  5. Pt indep with bed mob.                       Time Tracking:     PT Received On: 01/25/23  PT Start Time: 1010     PT Stop Time: 1038  PT Total Time (min): 28 min     Billable Minutes: Gait Training 12, Therapeutic Activity 3, and Therapeutic Exercise 13    Treatment Type: Treatment  PT/PTA: PTA     PTA Visit Number: 4     01/25/2023

## 2023-01-26 PROBLEM — D64.9 CHRONIC ANEMIA: Status: ACTIVE | Noted: 2023-01-19

## 2023-01-26 PROBLEM — E87.5 HYPERKALEMIA: Status: ACTIVE | Noted: 2023-01-26

## 2023-01-26 LAB
ANION GAP SERPL CALCULATED.3IONS-SCNC: 15 MMOL/L (ref 7–16)
BASOPHILS # BLD AUTO: 0.03 K/UL (ref 0–0.2)
BASOPHILS NFR BLD AUTO: 0.5 % (ref 0–1)
BUN SERPL-MCNC: 36 MG/DL (ref 7–18)
BUN/CREAT SERPL: 15 (ref 6–20)
CALCIUM SERPL-MCNC: 8.8 MG/DL (ref 8.5–10.1)
CHLORIDE SERPL-SCNC: 104 MMOL/L (ref 98–107)
CO2 SERPL-SCNC: 21 MMOL/L (ref 21–32)
CREAT SERPL-MCNC: 2.42 MG/DL (ref 0.7–1.3)
DIFFERENTIAL METHOD BLD: ABNORMAL
EGFR (NO RACE VARIABLE) (RUSH/TITUS): 27 ML/MIN/1.73M²
EOSINOPHIL # BLD AUTO: 0.09 K/UL (ref 0–0.5)
EOSINOPHIL NFR BLD AUTO: 1.5 % (ref 1–4)
ERYTHROCYTE [DISTWIDTH] IN BLOOD BY AUTOMATED COUNT: 12.1 % (ref 11.5–14.5)
GLUCOSE SERPL-MCNC: 132 MG/DL (ref 74–106)
GLUCOSE SERPL-MCNC: 190 MG/DL (ref 70–105)
GLUCOSE SERPL-MCNC: 196 MG/DL (ref 70–105)
HCT VFR BLD AUTO: 23.7 % (ref 40–54)
HGB BLD-MCNC: 7.9 G/DL (ref 13.5–18)
LYMPHOCYTES # BLD AUTO: 1.84 K/UL (ref 1–4.8)
LYMPHOCYTES NFR BLD AUTO: 30.8 % (ref 27–41)
MCH RBC QN AUTO: 31 PG (ref 27–31)
MCHC RBC AUTO-ENTMCNC: 33.3 G/DL (ref 32–36)
MCV RBC AUTO: 92.9 FL (ref 80–96)
MONOCYTES # BLD AUTO: 0.57 K/UL (ref 0–0.8)
MONOCYTES NFR BLD AUTO: 9.5 % (ref 2–6)
MPC BLD CALC-MCNC: 9.1 FL (ref 9.4–12.4)
NEUTROPHILS # BLD AUTO: 3.45 K/UL (ref 1.8–7.7)
NEUTROPHILS NFR BLD AUTO: 57.7 % (ref 53–65)
PLATELET # BLD AUTO: 280 K/UL (ref 150–400)
POTASSIUM SERPL-SCNC: 5.2 MMOL/L (ref 3.5–5.1)
RBC # BLD AUTO: 2.55 M/UL (ref 4.6–6.2)
SODIUM SERPL-SCNC: 135 MMOL/L (ref 136–145)
WBC # BLD AUTO: 5.98 K/UL (ref 4.5–11)

## 2023-01-26 PROCEDURE — 97110 THERAPEUTIC EXERCISES: CPT | Mod: CQ

## 2023-01-26 PROCEDURE — 25000003 PHARM REV CODE 250: Performed by: HOSPITALIST

## 2023-01-26 PROCEDURE — 97116 GAIT TRAINING THERAPY: CPT | Mod: CQ

## 2023-01-26 PROCEDURE — 80048 BASIC METABOLIC PNL TOTAL CA: CPT | Performed by: HOSPITALIST

## 2023-01-26 PROCEDURE — 85025 COMPLETE CBC W/AUTO DIFF WBC: CPT | Performed by: HOSPITALIST

## 2023-01-26 PROCEDURE — 99309 PR NURSING FAC CARE, SUBSEQ, SIGNIF COMPLIC: ICD-10-PCS | Mod: ,,, | Performed by: HOSPITALIST

## 2023-01-26 PROCEDURE — 82962 GLUCOSE BLOOD TEST: CPT

## 2023-01-26 PROCEDURE — 99309 SBSQ NF CARE MODERATE MDM 30: CPT | Mod: ,,, | Performed by: HOSPITALIST

## 2023-01-26 PROCEDURE — 27000958

## 2023-01-26 PROCEDURE — 11000004 HC SNF PRIVATE

## 2023-01-26 RX ORDER — SODIUM CHLORIDE 9 MG/ML
INJECTION, SOLUTION INTRAVENOUS CONTINUOUS
Status: DISCONTINUED | OUTPATIENT
Start: 2023-01-26 | End: 2023-01-28 | Stop reason: HOSPADM

## 2023-01-26 RX ADMIN — BRIMONIDINE TARTRATE 1 DROP: 2 SOLUTION/ DROPS OPHTHALMIC at 06:01

## 2023-01-26 RX ADMIN — GABAPENTIN 100 MG: 100 CAPSULE ORAL at 09:01

## 2023-01-26 RX ADMIN — BRIMONIDINE TARTRATE 1 DROP: 2 SOLUTION/ DROPS OPHTHALMIC at 01:01

## 2023-01-26 RX ADMIN — DORZOLAMIDE HYDROCHLORIDE AND TIMOLOL MALEATE 1 DROP: 20; 5 SOLUTION/ DROPS OPHTHALMIC at 02:01

## 2023-01-26 RX ADMIN — FINASTERIDE 5 MG: 5 TABLET, FILM COATED ORAL at 08:01

## 2023-01-26 RX ADMIN — DORZOLAMIDE HYDROCHLORIDE AND TIMOLOL MALEATE 1 DROP: 20; 5 SOLUTION/ DROPS OPHTHALMIC at 08:01

## 2023-01-26 RX ADMIN — GABAPENTIN 100 MG: 100 CAPSULE ORAL at 08:01

## 2023-01-26 RX ADMIN — DORZOLAMIDE HYDROCHLORIDE AND TIMOLOL MALEATE 1 DROP: 20; 5 SOLUTION/ DROPS OPHTHALMIC at 09:01

## 2023-01-26 RX ADMIN — SODIUM CHLORIDE: 9 INJECTION, SOLUTION INTRAVENOUS at 01:01

## 2023-01-26 RX ADMIN — TAMSULOSIN HYDROCHLORIDE 0.8 MG: 0.4 CAPSULE ORAL at 08:01

## 2023-01-26 RX ADMIN — AMLODIPINE BESYLATE 5 MG: 5 TABLET ORAL at 08:01

## 2023-01-26 RX ADMIN — BRIMONIDINE TARTRATE 1 DROP: 2 SOLUTION/ DROPS OPHTHALMIC at 09:01

## 2023-01-26 NOTE — PT/OT/SLP PROGRESS
Physical Therapy Treatment    Patient Name:  Jake Brown   MRN:  [id number]    Recommendations:     Discharge Recommendations: home  Discharge Equipment Recommendations: none  Barriers to discharge: Inaccessible home    Assessment:     Jake Brown is a 73 y.o. male admitted with a medical diagnosis of Weakness.  He presents with the following impairments/functional limitations: weakness, gait instability, impaired balance. Patient with LOB 2x laterally to the L this date requiring tactile cues from PTA to regain balance. Patient stated that he cannot hear out of his L ear, and it is causing him to lose his balance. Nurse notified of patient's reports.   Rehab Prognosis: Good; patient would benefit from acute skilled PT services to address these deficits and reach maximum level of function.    Recent Surgery: * No surgery found *      Plan:     During this hospitalization, patient to be seen 5 x/week to address the identified rehab impairments via gait training, therapeutic activities, therapeutic exercises and progress toward the following goals:    Plan of Care Expires:  02/03/23    Subjective     Chief Complaint: Patient stated that he cannot hear out of his L ear, which he stated is causing him to lose his balance. Patient stated that he has had this problem for a while.  Patient/Family Comments/goals: pt to dc home upon dc from hosp  Pain/Comfort:  Pain Rating 1: 0/10      Objective:     Communicated with pt prior to session. Patient found up in chair with mederos catheter upon PTA entry to room.     General Precautions: Standard, fall  Orthopedic Precautions:    Braces:    Respiratory Status: Room air     Functional Mobility:  Transfers:     Sit to Stand:  stand by assistance and contact guard assistance with rolling walker  Toilet Transfer: supervision and contact guard assistance with  no AD  using  Step Transfer  Gait: Pt ambulated 515 feet with RW and CGA.      AM-PAC 6 CLICK MOBILITY  Turning over in bed  (including adjusting bedclothes, sheets and blankets)?: 4  Sitting down on and standing up from a chair with arms (e.g., wheelchair, bedside commode, etc.): 4  Moving from lying on back to sitting on the side of the bed?: 4  Moving to and from a bed to a chair (including a wheelchair)?: 3  Need to walk in hospital room?: 4  Climbing 3-5 steps with a railing?: 1  Basic Mobility Total Score: 20       Treatment & Education:  Patient performed the following therapeutic exercises BLE 15x each: standing marching, mini squats, heel raises, sit to stand 10x  Patient left up in chair with call button in reach and guest present.    GOALS:   Multidisciplinary Problems       Physical Therapy Goals          Problem: Physical Therapy    Goal Priority Disciplines Outcome Goal Variances Interventions   Physical Therapy Goal     PT, PT/OT Ongoing, Progressing     Description: LTG - 2 weeks  1. Pt will have 4/5 BLE strength.  2. Pt  with STS and bed transfers.  3. Pt SBA to negotiated stairs with rail.  4. Pt will amb with appropriate AD x 250 ft with svn level surfaces and SBA unlevel surfaces.  5. Pt indep with bed mob.                       Time Tracking:     PT Received On: 01/26/23  PT Start Time: 1040     PT Stop Time: 1107  PT Total Time (min): 27 min     Billable Minutes: Gait Training 12, Therapeutic Activity 3, and Therapeutic Exercise 12    Treatment Type: Treatment  PT/PTA: PTA     PTA Visit Number: 5 01/26/2023

## 2023-01-26 NOTE — SUBJECTIVE & OBJECTIVE
Interval History: still doing ok, labs trending down, add IVFs today    Review of Systems   Respiratory:  Negative for shortness of breath.    Cardiovascular:  Negative for chest pain.   Gastrointestinal:  Negative for abdominal pain, nausea and vomiting.   Genitourinary:  Positive for difficulty urinating.   Neurological:  Positive for weakness.   Psychiatric/Behavioral:  Negative for agitation and confusion.    All other systems reviewed and are negative.  Objective:     Vital Signs (Most Recent):  Temp: 97.6 °F (36.4 °C) (01/26/23 0740)  Pulse: 66 (01/26/23 0740)  Resp: 20 (01/26/23 0740)  BP: 136/64 (01/26/23 0740)  SpO2: 100 % (01/26/23 0740) Vital Signs (24h Range):  Temp:  [97.6 °F (36.4 °C)-98.1 °F (36.7 °C)] 97.6 °F (36.4 °C)  Pulse:  [66-83] 66  Resp:  [20] 20  SpO2:  [98 %-100 %] 100 %  BP: (136-149)/(64-69) 136/64     Weight: 81.7 kg (180 lb 1.9 oz)  Body mass index is 26.6 kg/m².    Intake/Output Summary (Last 24 hours) at 1/26/2023 1258  Last data filed at 1/26/2023 0827  Gross per 24 hour   Intake 1610 ml   Output 1800 ml   Net -190 ml      Physical Exam  Vitals reviewed.   Constitutional:       Appearance: Normal appearance.   HENT:      Mouth/Throat:      Mouth: Mucous membranes are dry.   Eyes:      Extraocular Movements: Extraocular movements intact.      Pupils: Pupils are equal, round, and reactive to light.      Comments: Exopthalmus   Cardiovascular:      Rate and Rhythm: Normal rate and regular rhythm.   Pulmonary:      Effort: Pulmonary effort is normal. No respiratory distress.      Breath sounds: Normal breath sounds. No wheezing.   Abdominal:      General: Bowel sounds are normal. There is no distension.      Palpations: Abdomen is soft.      Tenderness: There is no abdominal tenderness.   Genitourinary:     Comments: + Bai   Musculoskeletal:         General: Normal range of motion.      Cervical back: Neck supple.   Skin:     General: Skin is warm and dry.      Coloration: Skin is not  jaundiced.   Neurological:      General: No focal deficit present.      Mental Status: He is alert and oriented to person, place, and time. Mental status is at baseline.   Psychiatric:         Mood and Affect: Mood normal.         Thought Content: Thought content normal.       Significant Labs: All pertinent labs within the past 24 hours have been reviewed.  Recent Lab Results         01/26/23  1040   01/26/23  0547   01/25/23  2108   01/25/23  1638        Anion Gap   15           Baso #   0.03           Basophil %   0.5           BUN   36           BUN/CREAT RATIO   15           Calcium   8.8           Chloride   104           CO2   21           Creatinine   2.42           Differential Type   Scan Smear           eGFR   27           Eos #   0.09           Eosinophil %   1.5           Glucose   132           Hematocrit   23.7           Hemoglobin   7.9           Lymph #   1.84           Lymph %   30.8           MCH   31.0           MCHC   33.3           MCV   92.9           Mono #   0.57           Mono %   9.5           MPV   9.1           Neutrophils, Abs   3.45           Neutrophils Relative   57.7           Platelets   280           POC Glucose 190     184   192       Potassium   5.2           RBC   2.55           RDW   12.1           Sodium   135           WBC   5.98                   Significant Imaging: I have reviewed all pertinent imaging results/findings within the past 24 hours.

## 2023-01-26 NOTE — PT/OT/SLP PROGRESS
Attempted to initiate tx with pt around 1310, but nurse was with him and reported pt is about to receive IV antibiotics to replace fluids and adjust sodium/postassium.  OT told pt will check back with him later in the afternoon.    Alexa Condon, OT/L

## 2023-01-26 NOTE — PLAN OF CARE
Problem: Adult Inpatient Plan of Care  Goal: Plan of Care Review  Outcome: Ongoing, Progressing  Goal: Patient-Specific Goal (Individualized)  Outcome: Ongoing, Progressing  Goal: Absence of Hospital-Acquired Illness or Injury  Outcome: Ongoing, Progressing  Goal: Optimal Comfort and Wellbeing  Outcome: Ongoing, Progressing  Goal: Readiness for Transition of Care  Outcome: Ongoing, Progressing     Problem: Diabetes Comorbidity  Goal: Blood Glucose Level Within Targeted Range  Outcome: Ongoing, Progressing     Problem: Skin Injury Risk Increased  Goal: Skin Health and Integrity  Outcome: Ongoing, Progressing     Problem: Urine Elimination Impaired (Urinary Diversion)  Goal: Effective Urinary Elimination  Outcome: Ongoing, Progressing  Intervention: Promote Optimal Urine Output  Flowsheets (Taken 1/26/2023 1976)  Urine Elimination Promotion (Post-Diversion):   catheter patency maintained   positioned to facilitate drainage   oral intake adequacy assessed

## 2023-01-26 NOTE — PT/OT/SLP PROGRESS
"Occupational Therapy      Patient Name:  Jake Brown   MRN:  [id number]    Patient not seen today secondary to Patient unwilling to participate, Other (Comment) (IV fluids being administered; pt states, "I just want to wait until tomorrrow."). Will follow-up tomorrow.    1/26/2023  "

## 2023-01-26 NOTE — PROGRESS NOTES
[facility] Medicine  Progress Note    Patient Name: Jake Brown  MRN: [id number]  Patient Class: IP- Swing   Admission Date: 1/18/2023  Length of Stay: 8 days  Attending Physician: Doron Godwin DO  Primary Care Provider: Osvaldo Hodges MD        Subjective:     Principal Problem:Weakness        HPI:  72yo BM with hx of CKD, HTN, DMII, admit to SWB s/p Sepsis and renal failure 2nd to UTI and dehydration.  He has had a mederos now due to retention.  Does not see a Urologist.  He is followed by Nephrologist.  He was evaluated and felt like he would benefit from SWB therapy.  H/H has trended down he denies any blood in stool.  No fevers or chills.  Doing well and participating.       Overview/Hospital Course:  1/20 Continues to do well.  Will try to remove mederos today and see if able to urinate on his own.  Encourage fluids.    1/21 1130 Called to room by Pt Nurse Ambrocio, who reports pt c/o bladder distention.  States pt is voiding 30-40 ml every hour or so.  Straight cath for residual with 1300 ml output.  Pt reports relief.  Will continue to monitor strict I & O  1/21 2130 called by TERRIE Light LPN, pt c/o bladder distention again, has only voided 40 ml since last straight cath.  Pt reports is trying to go but cannot.  Will replace mederos.    1/23 Mederos placed back with 3L or UOP.  Will keep until Urology follow up. Otherwise doing ok,  Working with therapy.     1/26 Labs show worsening renal function, H/H trending down as well.  Not sure if he is drinking enough fluids.  Not on any diuretics.        Interval History: still doing ok, labs trending down, add IVFs today    Review of Systems   Respiratory:  Negative for shortness of breath.    Cardiovascular:  Negative for chest pain.   Gastrointestinal:  Negative for abdominal pain, nausea and vomiting.   Genitourinary:  Positive for difficulty urinating.   Neurological:  Positive for weakness.   Psychiatric/Behavioral:   Negative for agitation and confusion.    All other systems reviewed and are negative.  Objective:     Vital Signs (Most Recent):  Temp: 97.6 °F (36.4 °C) (01/26/23 0740)  Pulse: 66 (01/26/23 0740)  Resp: 20 (01/26/23 0740)  BP: 136/64 (01/26/23 0740)  SpO2: 100 % (01/26/23 0740) Vital Signs (24h Range):  Temp:  [97.6 °F (36.4 °C)-98.1 °F (36.7 °C)] 97.6 °F (36.4 °C)  Pulse:  [66-83] 66  Resp:  [20] 20  SpO2:  [98 %-100 %] 100 %  BP: (136-149)/(64-69) 136/64     Weight: 81.7 kg (180 lb 1.9 oz)  Body mass index is 26.6 kg/m².    Intake/Output Summary (Last 24 hours) at 1/26/2023 1258  Last data filed at 1/26/2023 0827  Gross per 24 hour   Intake 1610 ml   Output 1800 ml   Net -190 ml      Physical Exam  Vitals reviewed.   Constitutional:       Appearance: Normal appearance.   HENT:      Mouth/Throat:      Mouth: Mucous membranes are dry.   Eyes:      Extraocular Movements: Extraocular movements intact.      Pupils: Pupils are equal, round, and reactive to light.      Comments: Exopthalmus   Cardiovascular:      Rate and Rhythm: Normal rate and regular rhythm.   Pulmonary:      Effort: Pulmonary effort is normal. No respiratory distress.      Breath sounds: Normal breath sounds. No wheezing.   Abdominal:      General: Bowel sounds are normal. There is no distension.      Palpations: Abdomen is soft.      Tenderness: There is no abdominal tenderness.   Genitourinary:     Comments: + Bai   Musculoskeletal:         General: Normal range of motion.      Cervical back: Neck supple.   Skin:     General: Skin is warm and dry.      Coloration: Skin is not jaundiced.   Neurological:      General: No focal deficit present.      Mental Status: He is alert and oriented to person, place, and time. Mental status is at baseline.   Psychiatric:         Mood and Affect: Mood normal.         Thought Content: Thought content normal.       Significant Labs: All pertinent labs within the past 24 hours have been reviewed.  Recent Lab  Results         01/26/23  1040   01/26/23  0547   01/25/23  2108   01/25/23  1638        Anion Gap   15           Baso #   0.03           Basophil %   0.5           BUN   36           BUN/CREAT RATIO   15           Calcium   8.8           Chloride   104           CO2   21           Creatinine   2.42           Differential Type   Scan Smear           eGFR   27           Eos #   0.09           Eosinophil %   1.5           Glucose   132           Hematocrit   23.7           Hemoglobin   7.9           Lymph #   1.84           Lymph %   30.8           MCH   31.0           MCHC   33.3           MCV   92.9           Mono #   0.57           Mono %   9.5           MPV   9.1           Neutrophils, Abs   3.45           Neutrophils Relative   57.7           Platelets   280           POC Glucose 190     184   192       Potassium   5.2           RBC   2.55           RDW   12.1           Sodium   135           WBC   5.98                   Significant Imaging: I have reviewed all pertinent imaging results/findings within the past 24 hours.      Assessment/Plan:      * Weakness  PT/OT and monitor.       Hyperkalemia  IVFs today repeat labs.       Chronic anemia  May need transfusion.  Follow labs.  Likely CKD related.     Acute on chronic renal failure    Add some IVFs today, repeat labs.     Benign prostatic hyperplasia without lower urinary tract symptoms  Continue current meds.  Needs Urology referral. Bai to remain.     Hyperthyroidism  Monitor TSH, was not on meds on arrival for treatment.       CHF (congestive heart failure)  Unknown type.  His fluid status seems stable.        CKD (chronic kidney disease)  Following numbers.  Slight trend up.  Will encourage fluids.  Avoid nephrotoxic drugs.       Essential hypertension    Currently on just Norvasc.  Stopped ACE due to renal issues.     Diabetes  Patient's FSGs are controlled on current medication regimen.  Last A1c reviewed-   Lab Results   Component Value Date    HGBA1C  6.2 01/11/2023     Most recent fingerstick glucose reviewed- No results for input(s): POCTGLUCOSE in the last 24 hours.  Current correctional scale  Low  Maintain anti-hyperglycemic dose as follows-   Antihyperglycemics (From admission, onward)    Start     Stop Route Frequency Ordered    01/18/23 1800  insulin aspart U-100 injection 0-5 Units         -- SubQ Before meals & nightly PRN 01/18/23 1657        Hold Oral hypoglycemics while patient is in the hospital.    VTE Risk Mitigation (From admission, onward)         Ordered     IP VTE HIGH RISK PATIENT  Once         01/18/23 1306                Discharge Planning   KARINA:      Code Status: Full Code   Is the patient medically ready for discharge?:     Reason for patient still in hospital (select all that apply): Patient trending condition, Laboratory test and Treatment  Discharge Plan A: Home with family, Home Health                  Doron Godwin DO  Department of Hospital Medicine   [facility]

## 2023-01-27 LAB
ANION GAP SERPL CALCULATED.3IONS-SCNC: 17 MMOL/L (ref 7–16)
BASOPHILS # BLD AUTO: 0.04 K/UL (ref 0–0.2)
BASOPHILS NFR BLD AUTO: 0.7 % (ref 0–1)
BUN SERPL-MCNC: 33 MG/DL (ref 7–18)
BUN/CREAT SERPL: 16 (ref 6–20)
CALCIUM SERPL-MCNC: 9.1 MG/DL (ref 8.5–10.1)
CHLORIDE SERPL-SCNC: 107 MMOL/L (ref 98–107)
CO2 SERPL-SCNC: 19 MMOL/L (ref 21–32)
CREAT SERPL-MCNC: 2.01 MG/DL (ref 0.7–1.3)
DIFFERENTIAL METHOD BLD: ABNORMAL
EGFR (NO RACE VARIABLE) (RUSH/TITUS): 34 ML/MIN/1.73M²
EOSINOPHIL # BLD AUTO: 0.07 K/UL (ref 0–0.5)
EOSINOPHIL NFR BLD AUTO: 1.1 % (ref 1–4)
ERYTHROCYTE [DISTWIDTH] IN BLOOD BY AUTOMATED COUNT: 12.1 % (ref 11.5–14.5)
GLUCOSE SERPL-MCNC: 129 MG/DL (ref 74–106)
GLUCOSE SERPL-MCNC: 133 MG/DL (ref 70–105)
GLUCOSE SERPL-MCNC: 144 MG/DL (ref 70–105)
GLUCOSE SERPL-MCNC: 167 MG/DL (ref 70–105)
GLUCOSE SERPL-MCNC: 236 MG/DL (ref 70–105)
HCT VFR BLD AUTO: 25 % (ref 40–54)
HGB BLD-MCNC: 8.5 G/DL (ref 13.5–18)
LYMPHOCYTES # BLD AUTO: 1.34 K/UL (ref 1–4.8)
LYMPHOCYTES NFR BLD AUTO: 21.8 % (ref 27–41)
MCH RBC QN AUTO: 31.3 PG (ref 27–31)
MCHC RBC AUTO-ENTMCNC: 34 G/DL (ref 32–36)
MCV RBC AUTO: 91.9 FL (ref 80–96)
MONOCYTES # BLD AUTO: 0.61 K/UL (ref 0–0.8)
MONOCYTES NFR BLD AUTO: 9.9 % (ref 2–6)
MPC BLD CALC-MCNC: 9.6 FL (ref 9.4–12.4)
NEUTROPHILS # BLD AUTO: 4.08 K/UL (ref 1.8–7.7)
NEUTROPHILS NFR BLD AUTO: 66.5 % (ref 53–65)
PLATELET # BLD AUTO: 266 K/UL (ref 150–400)
POTASSIUM SERPL-SCNC: 5.2 MMOL/L (ref 3.5–5.1)
RBC # BLD AUTO: 2.72 M/UL (ref 4.6–6.2)
SODIUM SERPL-SCNC: 138 MMOL/L (ref 136–145)
TSH SERPL DL<=0.005 MIU/L-ACNC: 0.82 UIU/ML (ref 0.36–3.74)
WBC # BLD AUTO: 6.14 K/UL (ref 4.5–11)

## 2023-01-27 PROCEDURE — 97116 GAIT TRAINING THERAPY: CPT

## 2023-01-27 PROCEDURE — 80048 BASIC METABOLIC PNL TOTAL CA: CPT | Performed by: HOSPITALIST

## 2023-01-27 PROCEDURE — 11000004 HC SNF PRIVATE

## 2023-01-27 PROCEDURE — 99308 SBSQ NF CARE LOW MDM 20: CPT | Mod: ,,, | Performed by: HOSPITALIST

## 2023-01-27 PROCEDURE — 97110 THERAPEUTIC EXERCISES: CPT

## 2023-01-27 PROCEDURE — 82962 GLUCOSE BLOOD TEST: CPT

## 2023-01-27 PROCEDURE — 97530 THERAPEUTIC ACTIVITIES: CPT

## 2023-01-27 PROCEDURE — 97535 SELF CARE MNGMENT TRAINING: CPT

## 2023-01-27 PROCEDURE — 99308 PR NURSING FAC CARE, SUBSEQ, MINOR COMPLIC: ICD-10-PCS | Mod: ,,, | Performed by: HOSPITALIST

## 2023-01-27 PROCEDURE — 63600175 PHARM REV CODE 636 W HCPCS: Performed by: HOSPITALIST

## 2023-01-27 PROCEDURE — 85025 COMPLETE CBC W/AUTO DIFF WBC: CPT | Performed by: HOSPITALIST

## 2023-01-27 PROCEDURE — 27000958

## 2023-01-27 PROCEDURE — 84443 ASSAY THYROID STIM HORMONE: CPT | Performed by: HOSPITALIST

## 2023-01-27 PROCEDURE — 25000003 PHARM REV CODE 250: Performed by: HOSPITALIST

## 2023-01-27 RX ADMIN — SODIUM CHLORIDE: 9 INJECTION, SOLUTION INTRAVENOUS at 09:01

## 2023-01-27 RX ADMIN — FINASTERIDE 5 MG: 5 TABLET, FILM COATED ORAL at 08:01

## 2023-01-27 RX ADMIN — DORZOLAMIDE HYDROCHLORIDE AND TIMOLOL MALEATE 1 DROP: 20; 5 SOLUTION/ DROPS OPHTHALMIC at 08:01

## 2023-01-27 RX ADMIN — INSULIN ASPART 1 UNITS: 100 INJECTION, SOLUTION INTRAVENOUS; SUBCUTANEOUS at 08:01

## 2023-01-27 RX ADMIN — GABAPENTIN 100 MG: 100 CAPSULE ORAL at 08:01

## 2023-01-27 RX ADMIN — AMLODIPINE BESYLATE 5 MG: 5 TABLET ORAL at 08:01

## 2023-01-27 RX ADMIN — BRIMONIDINE TARTRATE 1 DROP: 2 SOLUTION/ DROPS OPHTHALMIC at 10:01

## 2023-01-27 RX ADMIN — TAMSULOSIN HYDROCHLORIDE 0.8 MG: 0.4 CAPSULE ORAL at 08:01

## 2023-01-27 RX ADMIN — DORZOLAMIDE HYDROCHLORIDE AND TIMOLOL MALEATE 1 DROP: 20; 5 SOLUTION/ DROPS OPHTHALMIC at 02:01

## 2023-01-27 RX ADMIN — ACETAMINOPHEN 650 MG: 325 TABLET ORAL at 04:01

## 2023-01-27 RX ADMIN — SODIUM CHLORIDE: 9 INJECTION, SOLUTION INTRAVENOUS at 11:01

## 2023-01-27 RX ADMIN — BRIMONIDINE TARTRATE 1 DROP: 2 SOLUTION/ DROPS OPHTHALMIC at 02:01

## 2023-01-27 RX ADMIN — BRIMONIDINE TARTRATE 1 DROP: 2 SOLUTION/ DROPS OPHTHALMIC at 06:01

## 2023-01-27 NOTE — PT/OT/SLP PROGRESS
Occupational Therapy   Treatment    Name: Jake Brown  MRN: [id number]  Admitting Diagnosis:  Weakness       Recommendations:     Discharge Recommendations: home health OT, home with home health  Discharge Equipment Recommendations:  walker, rolling  Barriers to discharge:  None    Assessment:     Jake Brown is a 73 y.o. male with a medical diagnosis of Weakness.  He presents with no new complaints, finishing up with PT session. Performance deficits affecting function are weakness, impaired endurance, gait instability, impaired balance.     Rehab Prognosis:  Good; patient would benefit from acute skilled OT services to address these deficits and reach maximum level of function.       Plan:     Patient to be seen 5 x/week to address the above listed problems via self-care/home management, therapeutic activities, therapeutic exercises  Plan of Care Expires: 01/27/23  Plan of Care Reviewed with: patient    Subjective     Pain/Comfort:   None reported to OT    Objective:     Communicated with: pt prior to session.  Patient found up in chair with peripheral IV, mederos catheter upon OT entry to room.    General Precautions: Standard, fall, diabetic    Orthopedic Precautions:   Braces: N/A  Respiratory Status: Room air     Occupational Performance:     Bed Mobility:    Na today    Functional Mobility/Transfers:  See PT    Activities of Daily Living:  Feeding:  independence no need of setup  Grooming: independence at sinkside      [facility] 6 Click ADL:      Treatment & Education:  OT engaged pt in endurance/strength exercises to promote overall ability with self care.  Pt approaching max benefit.  Plan to d/c to home next week with home health services if physician clears pt to dc home     Patient left up in chair with call button in reach and family member present    GOALS:   Multidisciplinary Problems       Occupational Therapy Goals          Problem: Occupational Therapy    Goal Priority Disciplines Outcome Interventions    Occupational Therapy Goal     OT, PT/OT Ongoing, Progressing    Description: STG:    Pt will increase UB strength to WFL for functional tasks including using AD safely and effectively  Pt will improve endurance to good for functional tasks including self care  Pt will tolerate 30 minutes of tx without fatigue      LT.Restore to max I with self care and mobility.                          Time Tracking:     OT Date of Treatment: 23  OT Start Time: 1100  OT Stop Time: 1130  OT Total Time (min): 30 min    Billable Minutes:Self Care/Home Management 8  Therapeutic Exercise 15    OT/ALINA: OT          2023

## 2023-01-27 NOTE — PLAN OF CARE
Problem: Adult Inpatient Plan of Care  Goal: Patient-Specific Goal (Individualized)  Outcome: Ongoing, Progressing   All Labs from blood specimens with have normal limits.

## 2023-01-27 NOTE — PT/OT/SLP PROGRESS
"Physical Therapy Treatment    Patient Name:  Jake Brown   MRN:  [id number]    Recommendations:     Discharge Recommendations: home  Discharge Equipment Recommendations: none  Barriers to discharge: Inaccessible home    Assessment:     Jake Brown is a 73 y.o. male admitted with a medical diagnosis of Weakness.  He presents with the following impairments/functional limitations: weakness, gait instability . Pt now with IV for fluids. Pt initiated negotiation of step progressing from 4" to 8". STG 1,2 and 5 met.    Rehab Prognosis: Good; patient would benefit from acute skilled PT services to address these deficits and reach maximum level of function.    Recent Surgery: * No surgery found *      Plan:     During this hospitalization, patient to be seen 5 x/week to address the identified rehab impairments via gait training, therapeutic activities, therapeutic exercises and progress toward the following goals:    Plan of Care Expires:  02/03/23    Subjective     Chief Complaint: Dehydration now with IVF  Patient/Family Comments/goals: Pt told a family member on the phone that he may dc home this weekend.  Pain/Comfort:  Pain Rating 1: 0/10      Objective:     Communicated with pt prior to session.  Patient found up in chair with peripheral IV, mederos catheter upon PT entry to room.     General Precautions: Standard, fall  Orthopedic Precautions:    Braces:    Respiratory Status: Room air     Functional Mobility:  Bed Mobility:     Rolling Left:  independence  Scooting: independence  Supine to Sit: independence  Sit to Supine: independence  Transfers:  Sit to Stand:  independence with no AD  Bed to Chair: independence with  no AD  using  Stand Pivot  Toilet Transfer: modified independence with  rolling walker  using  Stand Pivot  Gait: step negotiation  Stairs:  Pt ascended/descended 4" curb step, 6" curb step, and 8" with Standard Walker with no handrails with Stand-by Assistance.       AM-PAC 6 CLICK MOBILITY  Turning " "over in bed (including adjusting bedclothes, sheets and blankets)?: 4  Sitting down on and standing up from a chair with arms (e.g., wheelchair, bedside commode, etc.): 4  Moving from lying on back to sitting on the side of the bed?: 4  Moving to and from a bed to a chair (including a wheelchair)?: 4  Need to walk in hospital room?: 4  Climbing 3-5 steps with a railing?: 3  Basic Mobility Total Score: 23       Treatment & Education:  PT reassessed bed mob, transfer and BLE strength (grossly 4/5). Pt performed step negotiation using SW for support with SBA for 4" step x 2 reps, 6" x 2 reps and 8" x 5 reps. Seated ther ex: resisted hip abd using blue tband x 15 reps, resisted knee flex x 20 using blue tband, partial STS X 20 with hands on chair in front of him. Standing ther ex resisted using blue tband x15 reps each: hip abd, hip ext.    Patient left up in chair with  IV intact and transported to OT dept ..    GOALS:   Multidisciplinary Problems       Physical Therapy Goals          Problem: Physical Therapy    Goal Priority Disciplines Outcome Goal Variances Interventions   Physical Therapy Goal     PT, PT/OT Ongoing, Progressing     Description: LTG - 2 weeks  1. Pt will have 4/5 BLE strength.  2. Pt  with STS and bed transfers.  3. Pt SBA to negotiated stairs with rail.  4. Pt will amb with appropriate AD x 250 ft with svn level surfaces and SBA unlevel surfaces.  5. Pt indep with bed mob.                       Time Tracking:     PT Received On: 01/27/23  PT Start Time: 1026     PT Stop Time: 1058  PT Total Time (min): 32 min     Billable Minutes: Gait Training 8, Therapeutic Activity 12, Therapeutic Exercise 12, and Total Time 32 min    Treatment Type: Treatment  PT/PTA: PT     PTA Visit Number: 0     01/27/2023  "

## 2023-01-27 NOTE — PLAN OF CARE
Problem: Adult Inpatient Plan of Care  Goal: Plan of Care Review  Outcome: Ongoing, Progressing  Goal: Patient-Specific Goal (Individualized)  Outcome: Ongoing, Progressing  Goal: Absence of Hospital-Acquired Illness or Injury  Outcome: Ongoing, Progressing  Goal: Optimal Comfort and Wellbeing  Outcome: Ongoing, Progressing  Goal: Readiness for Transition of Care  Outcome: Ongoing, Progressing     Problem: Diabetes Comorbidity  Goal: Blood Glucose Level Within Targeted Range  Outcome: Ongoing, Progressing  Intervention: Monitor and Manage Glycemia  Flowsheets (Taken 1/27/2023 5838)  Glycemic Management: blood glucose monitored     Problem: Infection  Goal: Absence of Infection Signs and Symptoms  Outcome: Ongoing, Progressing     Problem: Skin Injury Risk Increased  Goal: Skin Health and Integrity  Outcome: Ongoing, Progressing     Problem: Urine Elimination Impaired (Urinary Diversion)  Goal: Effective Urinary Elimination  Outcome: Ongoing, Progressing     Problem: Fluid and Electrolyte Imbalance (Acute Kidney Injury/Impairment)  Goal: Fluid and Electrolyte Balance  Outcome: Ongoing, Progressing

## 2023-01-27 NOTE — PROGRESS NOTES
[facility] Medicine  Progress Note    Patient Name: Jake Brown  MRN: [id number]  Patient Class: IP- Swing   Admission Date: 1/18/2023  Length of Stay: 9 days  Attending Physician: Doron Godwin DO  Primary Care Provider: Osvaldo Hodges MD        Subjective:     Principal Problem:Weakness        HPI:  72yo BM with hx of CKD, HTN, DMII, admit to SWB s/p Sepsis and renal failure 2nd to UTI and dehydration.  He has had a mederos now due to retention.  Does not see a Urologist.  He is followed by Nephrologist.  He was evaluated and felt like he would benefit from SWB therapy.  H/H has trended down he denies any blood in stool.  No fevers or chills.  Doing well and participating.       Overview/Hospital Course:  1/20 Continues to do well.  Will try to remove mederos today and see if able to urinate on his own.  Encourage fluids.    1/21 1130 Called to room by Pt Nurse Ambrocio, who reports pt c/o bladder distention.  States pt is voiding 30-40 ml every hour or so.  Straight cath for residual with 1300 ml output.  Pt reports relief.  Will continue to monitor strict I & O  1/21 2130 called by TERRIE Light LPN, pt c/o bladder distention again, has only voided 40 ml since last straight cath.  Pt reports is trying to go but cannot.  Will replace mederos.    1/23 Mederos placed back with 3L or UOP.  Will keep until Urology follow up. Otherwise doing ok,  Working with therapy.     1/26 Labs show worsening renal function, H/H trending down as well.  Not sure if he is drinking enough fluids.  Not on any diuretics.      1/27 Labs improving slowly.  Continue IVFs today.  He wants to go home.  I told him if labs better tomorrow he can.  Will need to keep mederos at DC.  Instructed nurses to teach him mederos care today.       Interval History: seems better, plan on DC tomorrow if labs better WITH mederos    Review of Systems   Respiratory:  Negative for shortness of breath.    Cardiovascular:  Negative for  chest pain.   Gastrointestinal:  Negative for abdominal pain, nausea and vomiting.   Psychiatric/Behavioral:  Negative for agitation and confusion.    All other systems reviewed and are negative.  Objective:     Vital Signs (Most Recent):  Temp: 97.6 °F (36.4 °C) (01/27/23 0715)  Pulse: 78 (01/27/23 0715)  Resp: 20 (01/27/23 0715)  BP: 110/62 (01/27/23 0715)  SpO2: 99 % (01/27/23 0715) Vital Signs (24h Range):  Temp:  [97.6 °F (36.4 °C)-98.4 °F (36.9 °C)] 97.6 °F (36.4 °C)  Pulse:  [78-85] 78  Resp:  [20] 20  SpO2:  [98 %-99 %] 99 %  BP: (110-143)/(62-64) 110/62     Weight: 81.7 kg (180 lb 1.9 oz)  Body mass index is 26.6 kg/m².    Intake/Output Summary (Last 24 hours) at 1/27/2023 1154  Last data filed at 1/27/2023 0839  Gross per 24 hour   Intake 3620 ml   Output 3900 ml   Net -280 ml      Physical Exam  Vitals reviewed.   Constitutional:       Appearance: Normal appearance.   HENT:      Mouth/Throat:      Mouth: Mucous membranes are dry.   Eyes:      Extraocular Movements: Extraocular movements intact.      Pupils: Pupils are equal, round, and reactive to light.      Comments: Exopthalmus   Cardiovascular:      Rate and Rhythm: Normal rate and regular rhythm.   Pulmonary:      Effort: Pulmonary effort is normal. No respiratory distress.      Breath sounds: Normal breath sounds. No wheezing.   Abdominal:      General: Bowel sounds are normal. There is no distension.      Palpations: Abdomen is soft.      Tenderness: There is no abdominal tenderness.   Genitourinary:     Comments: + Bai   Musculoskeletal:         General: Normal range of motion.      Cervical back: Neck supple.   Skin:     General: Skin is warm and dry.      Coloration: Skin is not jaundiced.   Neurological:      General: No focal deficit present.      Mental Status: He is alert and oriented to person, place, and time. Mental status is at baseline.   Psychiatric:         Mood and Affect: Mood normal.         Thought Content: Thought content  normal.       Significant Labs: All pertinent labs within the past 24 hours have been reviewed.  Recent Lab Results         01/27/23  1123   01/27/23  0555   01/27/23  0544   01/26/23  2130        Anion Gap   17           Baso #   0.04           Basophil %   0.7           BUN   33           BUN/CREAT RATIO   16           Calcium   9.1           Chloride   107           CO2   19           Creatinine   2.01           Differential Type   Scan Smear           eGFR   34           Eos #   0.07           Eosinophil %   1.1           Glucose   129           Hematocrit   25.0           Hemoglobin   8.5           Lymph #   1.34           Lymph %   21.8           MCH   31.3           MCHC   34.0           MCV   91.9           Mono #   0.61           Mono %   9.9           MPV   9.6           Neutrophils, Abs   4.08           Neutrophils Relative   66.5           Platelets   266           POC Glucose 167     144   196       Potassium   5.2           RBC   2.72           RDW   12.1           Sodium   138           TSH   0.820           WBC   6.14                   Significant Imaging: I have reviewed all pertinent imaging results/findings within the past 24 hours.      Assessment/Plan:      * Weakness  PT/OT and monitor.       Hyperkalemia  IVFs today repeat labs.       Chronic anemia  May need transfusion.  Follow labs.  Likely CKD related.     Acute on chronic renal failure    Add some IVFs today, repeat labs.     Benign prostatic hyperplasia without lower urinary tract symptoms  Continue current meds.  Needs Urology referral. Bai to remain.     Hyperthyroidism  Monitor TSH, was not on meds on arrival for treatment.       CHF (congestive heart failure)  Unknown type.  His fluid status seems stable.        CKD (chronic kidney disease)  Following numbers.  Slight trend up.  Will encourage fluids.  Avoid nephrotoxic drugs.       Essential hypertension    Currently on just Norvasc.  Stopped ACE due to renal issues.      Diabetes  Patient's FSGs are controlled on current medication regimen.  Last A1c reviewed-   Lab Results   Component Value Date    HGBA1C 6.2 01/11/2023     Most recent fingerstick glucose reviewed- No results for input(s): POCTGLUCOSE in the last 24 hours.  Current correctional scale  Low  Maintain anti-hyperglycemic dose as follows-   Antihyperglycemics (From admission, onward)    Start     Stop Route Frequency Ordered    01/18/23 1800  insulin aspart U-100 injection 0-5 Units         -- SubQ Before meals & nightly PRN 01/18/23 1657        Hold Oral hypoglycemics while patient is in the hospital.      VTE Risk Mitigation (From admission, onward)         Ordered     IP VTE HIGH RISK PATIENT  Once         01/18/23 1306                Discharge Planning   KARINA:      Code Status: Full Code   Is the patient medically ready for discharge?:     Reason for patient still in hospital (select all that apply): Patient trending condition and Laboratory test  Discharge Plan A: Home with family, Home Health                  Doron Godwin DO  Department of Hospital Medicine   [facility]

## 2023-01-27 NOTE — PLAN OF CARE
Problem: Physical Therapy  Goal: Physical Therapy Goal  Description: LTG - 2 weeks  1. Pt will have 4/5 BLE strength.-MET  2. Pt  SVN with STS and bed transfers.-MET  3. Pt SBA to negotiated stairs with rail.-PROGRESSING  4. Pt will amb with appropriate AD x 250 ft with svn level surfaces and SBA unlevel surfaces.-PROGRESSING  5. Pt indep with bed mob.-MET  Outcome: Ongoing, Progressing

## 2023-01-27 NOTE — SUBJECTIVE & OBJECTIVE
Interval History: seems better, plan on DC tomorrow if labs better WITH mederos    Review of Systems   Respiratory:  Negative for shortness of breath.    Cardiovascular:  Negative for chest pain.   Gastrointestinal:  Negative for abdominal pain, nausea and vomiting.   Psychiatric/Behavioral:  Negative for agitation and confusion.    All other systems reviewed and are negative.  Objective:     Vital Signs (Most Recent):  Temp: 97.6 °F (36.4 °C) (01/27/23 0715)  Pulse: 78 (01/27/23 0715)  Resp: 20 (01/27/23 0715)  BP: 110/62 (01/27/23 0715)  SpO2: 99 % (01/27/23 0715) Vital Signs (24h Range):  Temp:  [97.6 °F (36.4 °C)-98.4 °F (36.9 °C)] 97.6 °F (36.4 °C)  Pulse:  [78-85] 78  Resp:  [20] 20  SpO2:  [98 %-99 %] 99 %  BP: (110-143)/(62-64) 110/62     Weight: 81.7 kg (180 lb 1.9 oz)  Body mass index is 26.6 kg/m².    Intake/Output Summary (Last 24 hours) at 1/27/2023 1154  Last data filed at 1/27/2023 0839  Gross per 24 hour   Intake 3620 ml   Output 3900 ml   Net -280 ml      Physical Exam  Vitals reviewed.   Constitutional:       Appearance: Normal appearance.   HENT:      Mouth/Throat:      Mouth: Mucous membranes are dry.   Eyes:      Extraocular Movements: Extraocular movements intact.      Pupils: Pupils are equal, round, and reactive to light.      Comments: Exopthalmus   Cardiovascular:      Rate and Rhythm: Normal rate and regular rhythm.   Pulmonary:      Effort: Pulmonary effort is normal. No respiratory distress.      Breath sounds: Normal breath sounds. No wheezing.   Abdominal:      General: Bowel sounds are normal. There is no distension.      Palpations: Abdomen is soft.      Tenderness: There is no abdominal tenderness.   Genitourinary:     Comments: + Mederos   Musculoskeletal:         General: Normal range of motion.      Cervical back: Neck supple.   Skin:     General: Skin is warm and dry.      Coloration: Skin is not jaundiced.   Neurological:      General: No focal deficit present.      Mental Status:  He is alert and oriented to person, place, and time. Mental status is at baseline.   Psychiatric:         Mood and Affect: Mood normal.         Thought Content: Thought content normal.       Significant Labs: All pertinent labs within the past 24 hours have been reviewed.  Recent Lab Results         01/27/23  1123   01/27/23  0555   01/27/23  0544   01/26/23  2130        Anion Gap   17           Baso #   0.04           Basophil %   0.7           BUN   33           BUN/CREAT RATIO   16           Calcium   9.1           Chloride   107           CO2   19           Creatinine   2.01           Differential Type   Scan Smear           eGFR   34           Eos #   0.07           Eosinophil %   1.1           Glucose   129           Hematocrit   25.0           Hemoglobin   8.5           Lymph #   1.34           Lymph %   21.8           MCH   31.3           MCHC   34.0           MCV   91.9           Mono #   0.61           Mono %   9.9           MPV   9.6           Neutrophils, Abs   4.08           Neutrophils Relative   66.5           Platelets   266           POC Glucose 167     144   196       Potassium   5.2           RBC   2.72           RDW   12.1           Sodium   138           TSH   0.820           WBC   6.14                   Significant Imaging: I have reviewed all pertinent imaging results/findings within the past 24 hours.

## 2023-01-28 VITALS
RESPIRATION RATE: 20 BRPM | HEART RATE: 71 BPM | HEIGHT: 69 IN | OXYGEN SATURATION: 99 % | SYSTOLIC BLOOD PRESSURE: 154 MMHG | TEMPERATURE: 98 F | BODY MASS INDEX: 26.68 KG/M2 | DIASTOLIC BLOOD PRESSURE: 72 MMHG | WEIGHT: 180.13 LBS

## 2023-01-28 PROBLEM — R53.1 WEAKNESS: Status: RESOLVED | Noted: 2022-03-01 | Resolved: 2023-01-28

## 2023-01-28 PROBLEM — K21.9 GERD (GASTROESOPHAGEAL REFLUX DISEASE): Status: RESOLVED | Noted: 2021-06-09 | Resolved: 2023-01-28

## 2023-01-28 LAB
ANION GAP SERPL CALCULATED.3IONS-SCNC: 11 MMOL/L (ref 7–16)
BUN SERPL-MCNC: 34 MG/DL (ref 7–18)
BUN/CREAT SERPL: 18 (ref 6–20)
CALCIUM SERPL-MCNC: 8.8 MG/DL (ref 8.5–10.1)
CHLORIDE SERPL-SCNC: 109 MMOL/L (ref 98–107)
CO2 SERPL-SCNC: 23 MMOL/L (ref 21–32)
CREAT SERPL-MCNC: 1.87 MG/DL (ref 0.7–1.3)
EGFR (NO RACE VARIABLE) (RUSH/TITUS): 37 ML/MIN/1.73M²
GLUCOSE SERPL-MCNC: 121 MG/DL (ref 74–106)
GLUCOSE SERPL-MCNC: 137 MG/DL (ref 70–105)
POTASSIUM SERPL-SCNC: 5.4 MMOL/L (ref 3.5–5.1)
SODIUM SERPL-SCNC: 138 MMOL/L (ref 136–145)

## 2023-01-28 PROCEDURE — 80048 BASIC METABOLIC PNL TOTAL CA: CPT | Performed by: HOSPITALIST

## 2023-01-28 PROCEDURE — 82962 GLUCOSE BLOOD TEST: CPT

## 2023-01-28 PROCEDURE — 25000003 PHARM REV CODE 250: Performed by: HOSPITALIST

## 2023-01-28 PROCEDURE — 27000958

## 2023-01-28 RX ADMIN — AMLODIPINE BESYLATE 5 MG: 5 TABLET ORAL at 08:01

## 2023-01-28 RX ADMIN — TAMSULOSIN HYDROCHLORIDE 0.8 MG: 0.4 CAPSULE ORAL at 08:01

## 2023-01-28 RX ADMIN — FINASTERIDE 5 MG: 5 TABLET, FILM COATED ORAL at 08:01

## 2023-01-28 RX ADMIN — DORZOLAMIDE HYDROCHLORIDE AND TIMOLOL MALEATE 1 DROP: 20; 5 SOLUTION/ DROPS OPHTHALMIC at 08:01

## 2023-01-28 RX ADMIN — BRIMONIDINE TARTRATE 1 DROP: 2 SOLUTION/ DROPS OPHTHALMIC at 06:01

## 2023-01-28 RX ADMIN — GABAPENTIN 100 MG: 100 CAPSULE ORAL at 08:01

## 2023-01-28 RX ADMIN — ACETAMINOPHEN 650 MG: 325 TABLET ORAL at 08:01

## 2023-01-28 NOTE — NURSING
Nurses Note -- 4 Eyes      1/27/2023   9:40 PM      Skin assessed during: Q Shift Change      [x] No Pressure Injuries Present    []Prevention Measures Documented      [] Yes- Altered Skin Integrity Present or Discovered   [] LDA Added if Not in Epic (Describe Wound)   [] New Altered Skin Integrity was Present on Admit and Documented in LDA   [] Wound Image Taken    Wound Care Consulted? No    Attending Nurse:  Miguelina Bai RN     Second RN/Staff Member:  Jennifer Camilo RN

## 2023-01-28 NOTE — DISCHARGE INSTRUCTIONS
Take medication as directed by the label on the bottle    Follow up with [facility] in March

## 2023-01-28 NOTE — NURSING
Pt was wheeled out of facility at approximately @1023 with sister and cousin. Given AVS summary and explained proper mederos catheter care. Emptied 650 mL out of mederos bag before pt left.

## 2023-01-28 NOTE — DISCHARGE SUMMARY
[facility]  Hospital Medicine  Discharge Summary      Patient Name: Jake Brown  MRN: [id number]  DARIO: [id number]  Patient Class: IP- Swing  Admission Date: 1/18/2023  Hospital Length of Stay: 10 days  Discharge Date and Time:  01/28/2023 8:03 AM  Attending Physician: Doron Godwin DO   Discharging Provider: SHIMA Chaney  Primary Care Provider: Osvaldo Hodges MD    Primary Care Team: Networked reference to record PCT     HPI:   72yo BM with hx of CKD, HTN, DMII, admit to SWB s/p Sepsis and renal failure 2nd to UTI and dehydration.  He has had a mederos now due to retention.  Does not see a Urologist.  He is followed by Nephrologist.  He was evaluated and felt like he would benefit from SWB therapy.  H/H has trended down he denies any blood in stool.  No fevers or chills.  Doing well and participating.       * No surgery found *      Hospital Course:   1/20 Continues to do well.  Will try to remove mederos today and see if able to urinate on his own.  Encourage fluids.    1/21 1130 Called to room by Pt Nurse Ambrocio, who reports pt c/o bladder distention.  States pt is voiding 30-40 ml every hour or so.  Straight cath for residual with 1300 ml output.  Pt reports relief.  Will continue to monitor strict I & O  1/21 2130 called by TERRIE Light LPN, pt c/o bladder distention again, has only voided 40 ml since last straight cath.  Pt reports is trying to go but cannot.  Will replace mederos.    1/23 Mederos placed back with 3L or UOP.  Will keep until Urology follow up. Otherwise doing ok,  Working with therapy.     1/26 Labs show worsening renal function, H/H trending down as well.  Not sure if he is drinking enough fluids.  Not on any diuretics.      1/27 Labs improving slowly.  Continue IVFs today.  He wants to go home.  I told him if labs better tomorrow he can.  Will need to keep mederos at IA.  Instructed nurses to teach him mederos care today.        Goals of Care Treatment  Preferences:  Code Status: Full Code      Consults:   Consults (From admission, onward)        Status Ordering Provider     Inpatient consult to Registered Dietitian/Nutritionist  Once        Provider:  (Not yet assigned)    JURGEN Ware          No new Assessment & Plan notes have been filed under this hospital service since the last note was generated.  Service: Hospital Medicine    Final Active Diagnoses:    Diagnosis Date Noted POA    Hyperkalemia [E87.5] 01/26/2023 No    Chronic anemia [D64.9] 01/19/2023 No    Acute on chronic renal failure [N17.9, N18.9] 01/12/2023 No    Benign prostatic hyperplasia without lower urinary tract symptoms [N40.0] 03/01/2022 Yes    Hyperthyroidism [E05.90] 08/24/2021 Yes    CHF (congestive heart failure) [I50.9] 06/09/2021 Yes    CKD (chronic kidney disease) [N18.9] 06/09/2021 Yes    Diabetes [E11.9]  Yes    Essential hypertension [I10]  Yes      Problems Resolved During this Admission:    Diagnosis Date Noted Date Resolved POA    PRINCIPAL PROBLEM:  Weakness [R53.1] 03/01/2022 01/28/2023 Yes       Discharged Condition: good    Disposition:     Follow Up:   Follow-up Information     Rafa Walker MD. Go on 3/14/2023.    Specialty: Urology  Why: Appt time: 10:45 AM   Go to appt with mederos catheter. Bring any past lab results, paper documentation that you may need.  Contact information:  [address] [facility]    [facility]  [place] [id number] [phone]                       Patient Instructions:   No discharge procedures on file.    Significant Diagnostic Studies: Labs:   BMP:   Recent Labs   Lab 01/27/23  0555 01/28/23  0559   * 121*    138   K 5.2* 5.4*    109*   CO2 19* 23   BUN 33* 34*   CREATININE 2.01* 1.87*   CALCIUM 9.1 8.8       Pending Diagnostic Studies:     None         Medications:  Reconciled Home Medications:      Medication List      CONTINUE taking these medications    allopurinoL 100  "MG tablet  Commonly known as: ZYLOPRIM  Take 1 tablet (100 mg total) by mouth once daily.     amlodipine-benazepril 5-20 mg 5-20 mg per capsule  Commonly known as: LOTREL  Take 1 capsule by mouth once daily.     * BASAGLAR KWIKPEN U-100 INSULIN SUBQ  Inject 20 Units into the skin once daily.     * BASAGLAR KWIKPEN U-100 INSULIN SUBQ  Inject 35 Units into the skin every evening.     brimonidine 0.2% 0.2 % Drop  Commonly known as: ALPHAGAN  1 drop every 8 (eight) hours.     cetirizine 10 MG tablet  Commonly known as: ZYRTEC  TAKE ONE TABLET BY MOUTH EVERY DAY     cyproheptadine 4 mg tablet  Commonly known as: PERIACTIN  1/2 tab po twice daily     dorzolamide-timolol 2-0.5% 22.3-6.8 mg/mL ophthalmic solution  Commonly known as: COSOPT  1 drop 3 (three) times daily.     gabapentin 400 MG capsule  Commonly known as: NEURONTIN  Take 1 capsule (400 mg total) by mouth 2 (two) times daily.     latanoprost 0.005 % ophthalmic solution  Place 1 drop into both eyes nightly.     lovastatin 20 MG tablet  Commonly known as: MEVACOR  Take 1 tablet (20 mg total) by mouth nightly.     metFORMIN 1000 MG tablet  Commonly known as: GLUCOPHAGE  Take 1 tablet (1,000 mg total) by mouth 2 (two) times daily.     tamsulosin 0.4 mg Cap  Commonly known as: FLOMAX  Take 1 capsule (0.4 mg total) by mouth once daily.     TRUE METRIX GLUCOSE TEST STRIP Strp  Generic drug: blood sugar diagnostic  once daily. Test Blood Sugar     ULTICARE PEN NEEDLE 32 gauge x 5/32" Ndle  Generic drug: pen needle, diabetic  USE WITH BASAGLAR INSULIN TWICE DAILY         * This list has 2 medication(s) that are the same as other medications prescribed for you. Read the directions carefully, and ask your doctor or other care provider to review them with you.            STOP taking these medications    montelukast 10 mg tablet  Commonly known as: SINGULAIR        ASK your doctor about these medications    docosahexaenoic acid-epa 120-180 mg Cap  Take by mouth once " daily.            Indwelling Lines/Drains at time of discharge:   Lines/Drains/Airways     Drain  Duration                Urethral Catheter 01/21/23 6380 Non-latex 18 Fr. 6 days                Time spent on the discharge of patient: 35 minutes         SHIMA Chaney  Department of Hospital Medicine  [facility]

## 2023-01-28 NOTE — DISCHARGE SUMMARY
[facility]  Discharge Note  Short Stay    * No surgery found *      OUTCOME: Condition has improved and patient is now ready for discharge.    DISPOSITION: Home or Self Care    FINAL DIAGNOSIS:  Weakness    FOLLOWUP: With primary care provider    DISCHARGE INSTRUCTIONS:  No discharge procedures on file.      Clinical Reference Documents Added to Patient Instructions         Document    HOW TO CARE FOR YOUR GARG CATHETER (ENGLISH)    KIDNEY FAILURE DISCHARGE INSTRUCTIONS (ENGLISH)            TIME SPENT ON DISCHARGE: 35 minutes

## 2023-01-28 NOTE — PLAN OF CARE
Problem: Adult Inpatient Plan of Care  Goal: Plan of Care Review  1/28/2023 0949 by Jennifer Camilo RN  Outcome: Adequate for Care Transition  1/28/2023 0749 by Jennifer Camilo RN  Outcome: Ongoing, Progressing  Goal: Patient-Specific Goal (Individualized)  1/28/2023 0949 by Jennifer Camilo RN  Outcome: Adequate for Care Transition  1/28/2023 0749 by Jennifer Camilo RN  Outcome: Ongoing, Progressing  Goal: Absence of Hospital-Acquired Illness or Injury  1/28/2023 0949 by Jennifer Camilo RN  Outcome: Adequate for Care Transition  1/28/2023 0749 by Jennifer Camilo RN  Outcome: Ongoing, Progressing  Goal: Optimal Comfort and Wellbeing  1/28/2023 0949 by Jennifer Camilo RN  Outcome: Adequate for Care Transition  1/28/2023 0749 by Jennifer Camilo RN  Outcome: Ongoing, Progressing  Goal: Readiness for Transition of Care  1/28/2023 0949 by Jennifer Camilo RN  Outcome: Adequate for Care Transition  1/28/2023 0749 by Jennifer Camilo RN  Outcome: Ongoing, Progressing     Problem: Diabetes Comorbidity  Goal: Blood Glucose Level Within Targeted Range  1/28/2023 0949 by Jennifer Camilo RN  Outcome: Adequate for Care Transition  1/28/2023 0749 by Jennifer Camilo RN  Outcome: Ongoing, Progressing     Problem: Infection  Goal: Absence of Infection Signs and Symptoms  1/28/2023 0949 by Jennifer Camilo RN  Outcome: Adequate for Care Transition  1/28/2023 0749 by Jennifer Camilo RN  Outcome: Ongoing, Progressing     Problem: Skin Injury Risk Increased  Goal: Skin Health and Integrity  1/28/2023 0949 by Jennifer Camilo RN  Outcome: Adequate for Care Transition  1/28/2023 0749 by Jennifer Camilo RN  Outcome: Ongoing, Progressing     Problem: Urine Elimination Impaired (Urinary Diversion)  Goal: Effective Urinary Elimination  Outcome: Adequate for Care Transition     Problem: Fluid and Electrolyte Imbalance (Acute Kidney Injury/Impairment)  Goal: Fluid and Electrolyte Balance  Outcome: Adequate for Care Transition     Problem: Oral Intake Inadequate (Acute Kidney  Injury/Impairment)  Goal: Optimal Nutrition Intake  Outcome: Adequate for Care Transition     Problem: Renal Function Impairment (Acute Kidney Injury/Impairment)  Goal: Effective Renal Function  Outcome: Adequate for Care Transition

## 2023-02-07 ENCOUNTER — LAB REQUISITION (OUTPATIENT)
Dept: LAB | Facility: HOSPITAL | Age: 73
End: 2023-02-07
Attending: HOSPITALIST
Payer: COMMERCIAL

## 2023-02-07 DIAGNOSIS — R30.0 DYSURIA: ICD-10-CM

## 2023-02-07 LAB
BACTERIA #/AREA URNS HPF: ABNORMAL /HPF
BILIRUB UR QL STRIP: NEGATIVE
CLARITY UR: ABNORMAL
COLOR UR: YELLOW
GLUCOSE UR STRIP-MCNC: NEGATIVE MG/DL
KETONES UR STRIP-SCNC: NEGATIVE MG/DL
LEUKOCYTE ESTERASE UR QL STRIP: ABNORMAL
NITRITE UR QL STRIP: NEGATIVE
PH UR STRIP: 5 PH UNITS
PROT UR QL STRIP: 30
RBC # UR STRIP: ABNORMAL /UL
RBC #/AREA URNS HPF: ABNORMAL /HPF
SP GR UR STRIP: <=1.005
SQUAMOUS #/AREA URNS LPF: ABNORMAL /LPF
UROBILINOGEN UR STRIP-ACNC: 0.2 MG/DL
WBC #/AREA URNS HPF: ABNORMAL /HPF

## 2023-02-07 PROCEDURE — 87077 CULTURE AEROBIC IDENTIFY: CPT | Performed by: FAMILY MEDICINE

## 2023-02-07 PROCEDURE — 87186 SC STD MICRODIL/AGAR DIL: CPT | Performed by: FAMILY MEDICINE

## 2023-02-07 PROCEDURE — 81001 URINALYSIS AUTO W/SCOPE: CPT | Performed by: FAMILY MEDICINE

## 2023-02-10 LAB — UA COMPLETE W REFLEX CULTURE PNL UR: ABNORMAL

## 2023-05-01 PROBLEM — N18.9 ACUTE ON CHRONIC RENAL FAILURE: Status: RESOLVED | Noted: 2023-01-12 | Resolved: 2023-05-01

## 2023-05-01 PROBLEM — N17.9 ACUTE ON CHRONIC RENAL FAILURE: Status: RESOLVED | Noted: 2023-01-12 | Resolved: 2023-05-01

## 2023-05-04 ENCOUNTER — LAB REQUISITION (OUTPATIENT)
Dept: LAB | Facility: HOSPITAL | Age: 73
End: 2023-05-04
Attending: FAMILY MEDICINE
Payer: COMMERCIAL

## 2023-05-04 DIAGNOSIS — R30.0 DYSURIA: ICD-10-CM

## 2023-05-04 LAB
BACTERIA #/AREA URNS HPF: ABNORMAL /HPF
BILIRUB UR QL STRIP: NEGATIVE
CLARITY UR: CLEAR
COLOR UR: YELLOW
GLUCOSE UR STRIP-MCNC: NEGATIVE MG/DL
KETONES UR STRIP-SCNC: NEGATIVE MG/DL
LEUKOCYTE ESTERASE UR QL STRIP: ABNORMAL
NITRITE UR QL STRIP: NEGATIVE
PH UR STRIP: 7 PH UNITS
PROT UR QL STRIP: 100
RBC # UR STRIP: ABNORMAL /UL
SP GR UR STRIP: 1.01
UROBILINOGEN UR STRIP-ACNC: 0.2 MG/DL
WBC #/AREA URNS HPF: ABNORMAL /HPF

## 2023-05-04 PROCEDURE — 87077 CULTURE AEROBIC IDENTIFY: CPT | Performed by: FAMILY MEDICINE

## 2023-05-04 PROCEDURE — 87086 URINE CULTURE/COLONY COUNT: CPT | Performed by: FAMILY MEDICINE

## 2023-05-04 PROCEDURE — 81001 URINALYSIS AUTO W/SCOPE: CPT | Performed by: FAMILY MEDICINE

## 2023-05-07 LAB — UA COMPLETE W REFLEX CULTURE PNL UR: ABNORMAL

## 2023-05-08 ENCOUNTER — LAB REQUISITION (OUTPATIENT)
Dept: LAB | Facility: HOSPITAL | Age: 73
End: 2023-05-08
Attending: FAMILY MEDICINE
Payer: COMMERCIAL

## 2023-05-08 DIAGNOSIS — R30.0 DYSURIA: ICD-10-CM

## 2023-05-08 LAB
BACTERIA #/AREA URNS HPF: ABNORMAL /HPF
BILIRUB UR QL STRIP: NEGATIVE
CLARITY UR: CLEAR
COLOR UR: YELLOW
GLUCOSE UR STRIP-MCNC: NEGATIVE MG/DL
KETONES UR STRIP-SCNC: NEGATIVE MG/DL
LEUKOCYTE ESTERASE UR QL STRIP: ABNORMAL
NITRITE UR QL STRIP: NEGATIVE
PH UR STRIP: 7 PH UNITS
PROT UR QL STRIP: 100
RBC # UR STRIP: NEGATIVE /UL
SP GR UR STRIP: 1.01
UROBILINOGEN UR STRIP-ACNC: 0.2 MG/DL
WBC #/AREA URNS HPF: ABNORMAL /HPF

## 2023-05-08 PROCEDURE — 81001 URINALYSIS AUTO W/SCOPE: CPT

## 2023-06-13 ENCOUNTER — LAB REQUISITION (OUTPATIENT)
Dept: LAB | Facility: HOSPITAL | Age: 73
End: 2023-06-13
Attending: FAMILY MEDICINE
Payer: COMMERCIAL

## 2023-06-13 DIAGNOSIS — I13.0 HYPERTENSIVE HEART AND CHRONIC KIDNEY DISEASE WITH HEART FAILURE AND STAGE 1 THROUGH STAGE 4 CHRONIC KIDNEY DISEASE, OR UNSPECIFIED CHRONIC KIDNEY DISEASE: ICD-10-CM

## 2023-06-13 LAB
BACTERIA #/AREA URNS HPF: ABNORMAL /HPF
BILIRUB UR QL STRIP: NEGATIVE
CLARITY UR: ABNORMAL
COLOR UR: YELLOW
GLUCOSE UR STRIP-MCNC: NEGATIVE MG/DL
KETONES UR STRIP-SCNC: NEGATIVE MG/DL
LEUKOCYTE ESTERASE UR QL STRIP: ABNORMAL
NITRITE UR QL STRIP: NEGATIVE
PH UR STRIP: 8.5 PH UNITS
PROT UR QL STRIP: 100
RBC # UR STRIP: ABNORMAL /UL
SP GR UR STRIP: 1.01
UROBILINOGEN UR STRIP-ACNC: 0.2 MG/DL
WBC #/AREA URNS HPF: ABNORMAL /HPF

## 2023-06-13 PROCEDURE — 81001 URINALYSIS AUTO W/SCOPE: CPT

## 2023-06-13 PROCEDURE — 87086 URINE CULTURE/COLONY COUNT: CPT

## 2023-06-13 PROCEDURE — 87077 CULTURE AEROBIC IDENTIFY: CPT

## 2023-06-15 LAB — UA COMPLETE W REFLEX CULTURE PNL UR: ABNORMAL

## 2023-06-26 ENCOUNTER — LAB REQUISITION (OUTPATIENT)
Dept: LAB | Facility: HOSPITAL | Age: 73
End: 2023-06-26
Attending: FAMILY MEDICINE
Payer: COMMERCIAL

## 2023-06-26 DIAGNOSIS — N18.9 CHRONIC KIDNEY DISEASE, UNSPECIFIED: ICD-10-CM

## 2023-06-26 LAB
BACTERIA #/AREA URNS HPF: NORMAL /HPF
BILIRUB UR QL STRIP: NEGATIVE
CLARITY UR: CLEAR
COLOR UR: YELLOW
GLUCOSE UR STRIP-MCNC: NEGATIVE MG/DL
KETONES UR STRIP-SCNC: NEGATIVE MG/DL
LEUKOCYTE ESTERASE UR QL STRIP: ABNORMAL
NITRITE UR QL STRIP: NEGATIVE
PH UR STRIP: 6 PH UNITS
PROT UR QL STRIP: ABNORMAL
RBC # UR STRIP: ABNORMAL /UL
RBC #/AREA URNS HPF: NORMAL /HPF
SP GR UR STRIP: 1.01
SQUAMOUS #/AREA URNS LPF: NORMAL /LPF
UROBILINOGEN UR STRIP-ACNC: 0.2 MG/DL
WBC #/AREA URNS HPF: NORMAL /HPF

## 2023-06-26 PROCEDURE — 81001 URINALYSIS AUTO W/SCOPE: CPT | Performed by: FAMILY MEDICINE

## 2023-08-02 ENCOUNTER — LAB REQUISITION (OUTPATIENT)
Dept: LAB | Facility: HOSPITAL | Age: 73
End: 2023-08-02
Attending: FAMILY MEDICINE
Payer: COMMERCIAL

## 2023-08-02 DIAGNOSIS — I50.9 HEART FAILURE, UNSPECIFIED: ICD-10-CM

## 2023-08-02 DIAGNOSIS — N18.9 CHRONIC KIDNEY DISEASE, UNSPECIFIED: ICD-10-CM

## 2023-08-02 LAB
ANION GAP SERPL CALCULATED.3IONS-SCNC: 16 MMOL/L (ref 7–16)
BUN SERPL-MCNC: 47 MG/DL (ref 7–18)
BUN/CREAT SERPL: 14 (ref 6–20)
CALCIUM SERPL-MCNC: 8.6 MG/DL (ref 8.5–10.1)
CHLORIDE SERPL-SCNC: 104 MMOL/L (ref 98–107)
CO2 SERPL-SCNC: 23 MMOL/L (ref 21–32)
CREAT SERPL-MCNC: 3.37 MG/DL (ref 0.7–1.3)
EGFR (NO RACE VARIABLE) (RUSH/TITUS): 18 ML/MIN/1.73M2
GLUCOSE SERPL-MCNC: 89 MG/DL (ref 74–106)
POTASSIUM SERPL-SCNC: 5.6 MMOL/L (ref 3.5–5.1)
SODIUM SERPL-SCNC: 137 MMOL/L (ref 136–145)

## 2023-08-02 PROCEDURE — 80048 BASIC METABOLIC PNL TOTAL CA: CPT

## 2023-09-21 ENCOUNTER — LAB REQUISITION (OUTPATIENT)
Dept: LAB | Facility: HOSPITAL | Age: 73
End: 2023-09-21
Attending: FAMILY MEDICINE
Payer: COMMERCIAL

## 2023-09-21 DIAGNOSIS — I13.0 HYPERTENSIVE HEART AND CHRONIC KIDNEY DISEASE WITH HEART FAILURE AND STAGE 1 THROUGH STAGE 4 CHRONIC KIDNEY DISEASE, OR UNSPECIFIED CHRONIC KIDNEY DISEASE: ICD-10-CM

## 2023-09-21 DIAGNOSIS — N17.9 ACUTE KIDNEY FAILURE, UNSPECIFIED: ICD-10-CM

## 2023-09-21 LAB — 25(OH)D3 SERPL-MCNC: 49.6 NG/ML

## 2023-09-21 PROCEDURE — 82306 VITAMIN D 25 HYDROXY: CPT

## 2024-02-06 ENCOUNTER — LAB REQUISITION (OUTPATIENT)
Dept: LAB | Facility: HOSPITAL | Age: 74
End: 2024-02-06
Attending: FAMILY MEDICINE
Payer: COMMERCIAL

## 2024-02-06 DIAGNOSIS — R30.0 DYSURIA: ICD-10-CM

## 2024-02-06 LAB
BACTERIA #/AREA URNS HPF: NORMAL /HPF
BILIRUB UR QL STRIP: NEGATIVE
CLARITY UR: CLEAR
COLOR UR: YELLOW
GLUCOSE UR STRIP-MCNC: NEGATIVE MG/DL
KETONES UR STRIP-SCNC: NEGATIVE MG/DL
LEUKOCYTE ESTERASE UR QL STRIP: NEGATIVE
NITRITE UR QL STRIP: NEGATIVE
PH UR STRIP: 8 PH UNITS
PROT UR QL STRIP: >=300
RBC # UR STRIP: ABNORMAL /UL
RBC #/AREA URNS HPF: NORMAL /HPF
SP GR UR STRIP: 1.02
UROBILINOGEN UR STRIP-ACNC: 0.2 MG/DL
WBC #/AREA URNS HPF: NORMAL /HPF

## 2024-02-06 PROCEDURE — 81003 URINALYSIS AUTO W/O SCOPE: CPT

## 2024-05-20 ENCOUNTER — LAB REQUISITION (OUTPATIENT)
Dept: LAB | Facility: HOSPITAL | Age: 74
End: 2024-05-20
Attending: FAMILY MEDICINE
Payer: COMMERCIAL

## 2024-05-20 DIAGNOSIS — E55.9 VITAMIN D DEFICIENCY, UNSPECIFIED: ICD-10-CM

## 2024-05-20 LAB — 25(OH)D3 SERPL-MCNC: 64.3 NG/ML

## 2024-05-20 PROCEDURE — 82306 VITAMIN D 25 HYDROXY: CPT | Performed by: FAMILY MEDICINE

## 2024-08-28 ENCOUNTER — LAB REQUISITION (OUTPATIENT)
Dept: LAB | Facility: HOSPITAL | Age: 74
End: 2024-08-28
Attending: FAMILY MEDICINE
Payer: COMMERCIAL

## 2024-08-28 DIAGNOSIS — I50.9 HEART FAILURE, UNSPECIFIED: ICD-10-CM

## 2024-08-28 LAB
ANION GAP SERPL CALCULATED.3IONS-SCNC: 14 MMOL/L (ref 7–16)
BUN SERPL-MCNC: 18 MG/DL (ref 7–18)
BUN/CREAT SERPL: 9 (ref 6–20)
CALCIUM SERPL-MCNC: 9 MG/DL (ref 8.5–10.1)
CHLORIDE SERPL-SCNC: 107 MMOL/L (ref 98–107)
CO2 SERPL-SCNC: 26 MMOL/L (ref 21–32)
CREAT SERPL-MCNC: 2.09 MG/DL (ref 0.7–1.3)
EGFR (NO RACE VARIABLE) (RUSH/TITUS): 32 ML/MIN/1.73M2
GLUCOSE SERPL-MCNC: 90 MG/DL (ref 74–106)
POTASSIUM SERPL-SCNC: 4.9 MMOL/L (ref 3.5–5.1)
SODIUM SERPL-SCNC: 142 MMOL/L (ref 136–145)

## 2024-08-28 PROCEDURE — 80048 BASIC METABOLIC PNL TOTAL CA: CPT

## 2024-11-26 NOTE — HPI
Mr Roland is a 72 y/o male that presented to [facility] ED with fever, N/V/D x2 days. He was found to have Urosepsis and LIZANDRO. WBC 27.9. Lactate 2.7. Creatinine 4.9, BUN 69. This is up from recent labs on 1/9 with creatinine 3.10. CT abdomen pelvis showed layering densities in gallbladder likely small stones. Amylase/Lipase WNL. Fever up to 102.8, improved to 101 with Tylenol. HR up to 122, improved with IV fluids. He had 2 episodes of hypotension while in ED but resolved with IV fluids. He exhibited no abd tenderness and Greenberg's sign negative. given Zofran and nausea subsided. No vomiting while in ED.     Code Status: FULL CODE    1/14/23; Patient more alert and awake today T-max last night 99.0 patient afebrile after 12 midnight.  Urine culture grew out Klebsiella pneumoniae, it is resistant to Rocephin.  We will continue antibiotics as prescribed, we will give 1 more L of normal saline at 50 cc an hour to help correct BUN of 49 and creatinine 2.21.   [Sneakers] : rosie [FreeTextEntry1] : Mr. CAROLANN ATKINSON is a 62-year-old male who presents to office for follow up (last seen (9/12/24) due to left feel pain around 3 months ago after stepping on electric plug on the floor. Patient states pain has improved since last visit; however, he is still feeling sharp pain in the heel area. Patient states he numbness/tingling on left foot after walking his dog and at night. Patient denies any new injuries.  Patient states he bought new sneakers and the gel heel cup which has improved the symptoms. He states he is compliant with calf stretching exercises and HEP.